# Patient Record
Sex: FEMALE | Race: WHITE | NOT HISPANIC OR LATINO | Employment: OTHER | URBAN - METROPOLITAN AREA
[De-identification: names, ages, dates, MRNs, and addresses within clinical notes are randomized per-mention and may not be internally consistent; named-entity substitution may affect disease eponyms.]

---

## 2017-01-11 ENCOUNTER — COMMUNICATION - HEALTHEAST (OUTPATIENT)
Dept: RHEUMATOLOGY | Facility: CLINIC | Age: 62
End: 2017-01-11

## 2017-01-11 ENCOUNTER — OFFICE VISIT - HEALTHEAST (OUTPATIENT)
Dept: FAMILY MEDICINE | Facility: CLINIC | Age: 62
End: 2017-01-11

## 2017-01-11 DIAGNOSIS — M06.9 RHEUMATOID ARTHRITIS (H): ICD-10-CM

## 2017-01-11 DIAGNOSIS — M05.9 SEROPOSITIVE RHEUMATOID ARTHRITIS (H): ICD-10-CM

## 2017-01-11 DIAGNOSIS — J32.9 RHINOSINUSITIS: ICD-10-CM

## 2017-01-12 LAB
ALT SERPL W P-5'-P-CCNC: 25 U/L (ref 0–45)
CREAT SERPL-MCNC: 0.93 MG/DL (ref 0.6–1.1)
GFR SERPL CREATININE-BSD FRML MDRD: >60 ML/MIN/1.73M2

## 2017-02-14 ENCOUNTER — COMMUNICATION - HEALTHEAST (OUTPATIENT)
Dept: RHEUMATOLOGY | Facility: CLINIC | Age: 62
End: 2017-02-14

## 2017-02-14 DIAGNOSIS — M05.9 SEROPOSITIVE RHEUMATOID ARTHRITIS (H): ICD-10-CM

## 2017-02-16 ENCOUNTER — COMMUNICATION - HEALTHEAST (OUTPATIENT)
Dept: FAMILY MEDICINE | Facility: CLINIC | Age: 62
End: 2017-02-16

## 2017-02-20 ENCOUNTER — COMMUNICATION - HEALTHEAST (OUTPATIENT)
Dept: SCHEDULING | Facility: CLINIC | Age: 62
End: 2017-02-20

## 2017-02-21 ENCOUNTER — OFFICE VISIT - HEALTHEAST (OUTPATIENT)
Dept: FAMILY MEDICINE | Facility: CLINIC | Age: 62
End: 2017-02-21

## 2017-02-21 DIAGNOSIS — D72.819 LEUKOPENIA, UNSPECIFIED TYPE: ICD-10-CM

## 2017-02-21 DIAGNOSIS — D84.9 IMMUNOSUPPRESSED STATUS (H): ICD-10-CM

## 2017-02-21 DIAGNOSIS — Z79.899 HIGH RISK MEDICATION USE: ICD-10-CM

## 2017-02-21 DIAGNOSIS — J10.1 INFLUENZA A: ICD-10-CM

## 2017-02-21 ASSESSMENT — MIFFLIN-ST. JEOR: SCORE: 1222.2

## 2017-03-06 ENCOUNTER — AMBULATORY - HEALTHEAST (OUTPATIENT)
Dept: LAB | Facility: CLINIC | Age: 62
End: 2017-03-06

## 2017-03-06 ENCOUNTER — COMMUNICATION - HEALTHEAST (OUTPATIENT)
Dept: LAB | Facility: CLINIC | Age: 62
End: 2017-03-06

## 2017-03-06 DIAGNOSIS — M06.9 RA (RHEUMATOID ARTHRITIS) (H): ICD-10-CM

## 2017-03-06 LAB
ALT SERPL W P-5'-P-CCNC: 25 U/L (ref 0–45)
CREAT SERPL-MCNC: 0.76 MG/DL (ref 0.6–1.1)
GFR SERPL CREATININE-BSD FRML MDRD: >60 ML/MIN/1.73M2

## 2017-03-08 ENCOUNTER — COMMUNICATION - HEALTHEAST (OUTPATIENT)
Dept: RHEUMATOLOGY | Facility: CLINIC | Age: 62
End: 2017-03-08

## 2017-03-08 DIAGNOSIS — M05.9 SEROPOSITIVE RHEUMATOID ARTHRITIS (H): ICD-10-CM

## 2017-03-09 ENCOUNTER — OFFICE VISIT - HEALTHEAST (OUTPATIENT)
Dept: RHEUMATOLOGY | Facility: CLINIC | Age: 62
End: 2017-03-09

## 2017-03-09 DIAGNOSIS — Z79.899 HIGH RISK MEDICATION USE: ICD-10-CM

## 2017-03-09 DIAGNOSIS — M05.79 SEROPOSITIVE RHEUMATOID ARTHRITIS OF MULTIPLE SITES (H): ICD-10-CM

## 2017-03-09 DIAGNOSIS — R76.8 CYCLIC CITRULLINATED PEPTIDE (CCP) ANTIBODY POSITIVE: ICD-10-CM

## 2017-03-09 ASSESSMENT — MIFFLIN-ST. JEOR: SCORE: 1215.4

## 2017-04-19 ENCOUNTER — RECORDS - HEALTHEAST (OUTPATIENT)
Dept: ADMINISTRATIVE | Facility: OTHER | Age: 62
End: 2017-04-19

## 2017-05-08 ENCOUNTER — AMBULATORY - HEALTHEAST (OUTPATIENT)
Dept: LAB | Facility: CLINIC | Age: 62
End: 2017-05-08

## 2017-05-08 DIAGNOSIS — M06.9 RA (RHEUMATOID ARTHRITIS) (H): ICD-10-CM

## 2017-05-08 LAB
ALT SERPL W P-5'-P-CCNC: 22 U/L (ref 0–45)
CREAT SERPL-MCNC: 0.79 MG/DL (ref 0.6–1.1)
GFR SERPL CREATININE-BSD FRML MDRD: >60 ML/MIN/1.73M2

## 2017-05-25 ENCOUNTER — COMMUNICATION - HEALTHEAST (OUTPATIENT)
Dept: RHEUMATOLOGY | Facility: CLINIC | Age: 62
End: 2017-05-25

## 2017-05-25 DIAGNOSIS — M05.79 SEROPOSITIVE RHEUMATOID ARTHRITIS OF MULTIPLE SITES (H): ICD-10-CM

## 2017-05-25 DIAGNOSIS — M05.9 SEROPOSITIVE RHEUMATOID ARTHRITIS (H): ICD-10-CM

## 2017-06-21 ENCOUNTER — COMMUNICATION - HEALTHEAST (OUTPATIENT)
Dept: FAMILY MEDICINE | Facility: CLINIC | Age: 62
End: 2017-06-21

## 2017-07-11 ENCOUNTER — AMBULATORY - HEALTHEAST (OUTPATIENT)
Dept: LAB | Facility: CLINIC | Age: 62
End: 2017-07-11

## 2017-07-11 DIAGNOSIS — M06.9 RA (RHEUMATOID ARTHRITIS) (H): ICD-10-CM

## 2017-07-11 LAB
ALT SERPL W P-5'-P-CCNC: 23 U/L (ref 0–45)
CREAT SERPL-MCNC: 0.69 MG/DL (ref 0.6–1.1)
GFR SERPL CREATININE-BSD FRML MDRD: >60 ML/MIN/1.73M2

## 2017-07-13 ENCOUNTER — OFFICE VISIT - HEALTHEAST (OUTPATIENT)
Dept: RHEUMATOLOGY | Facility: CLINIC | Age: 62
End: 2017-07-13

## 2017-07-13 DIAGNOSIS — Z79.899 HIGH RISK MEDICATION USE: ICD-10-CM

## 2017-07-13 DIAGNOSIS — M05.79 SEROPOSITIVE RHEUMATOID ARTHRITIS OF MULTIPLE SITES (H): ICD-10-CM

## 2017-07-13 DIAGNOSIS — R76.8 CYCLIC CITRULLINATED PEPTIDE (CCP) ANTIBODY POSITIVE: ICD-10-CM

## 2017-07-13 ASSESSMENT — MIFFLIN-ST. JEOR: SCORE: 1232.18

## 2017-09-13 ENCOUNTER — AMBULATORY - HEALTHEAST (OUTPATIENT)
Dept: LAB | Facility: CLINIC | Age: 62
End: 2017-09-13

## 2017-09-13 DIAGNOSIS — M06.9 RA (RHEUMATOID ARTHRITIS) (H): ICD-10-CM

## 2017-09-13 LAB
ALT SERPL W P-5'-P-CCNC: 20 U/L (ref 0–45)
CREAT SERPL-MCNC: 0.85 MG/DL (ref 0.6–1.1)
GFR SERPL CREATININE-BSD FRML MDRD: >60 ML/MIN/1.73M2

## 2017-09-20 ENCOUNTER — COMMUNICATION - HEALTHEAST (OUTPATIENT)
Dept: RHEUMATOLOGY | Facility: CLINIC | Age: 62
End: 2017-09-20

## 2017-09-20 DIAGNOSIS — M05.79 SEROPOSITIVE RHEUMATOID ARTHRITIS OF MULTIPLE SITES (H): ICD-10-CM

## 2017-10-24 ENCOUNTER — COMMUNICATION - HEALTHEAST (OUTPATIENT)
Dept: ENDOCRINOLOGY | Facility: CLINIC | Age: 62
End: 2017-10-24

## 2017-10-24 DIAGNOSIS — M05.79 SEROPOSITIVE RHEUMATOID ARTHRITIS OF MULTIPLE SITES (H): ICD-10-CM

## 2017-11-07 ENCOUNTER — RECORDS - HEALTHEAST (OUTPATIENT)
Dept: ADMINISTRATIVE | Facility: OTHER | Age: 62
End: 2017-11-07

## 2017-11-13 ENCOUNTER — AMBULATORY - HEALTHEAST (OUTPATIENT)
Dept: LAB | Facility: CLINIC | Age: 62
End: 2017-11-13

## 2017-11-13 DIAGNOSIS — M06.9 RA (RHEUMATOID ARTHRITIS) (H): ICD-10-CM

## 2017-11-13 LAB
ALT SERPL W P-5'-P-CCNC: 24 U/L (ref 0–45)
CREAT SERPL-MCNC: 0.75 MG/DL (ref 0.6–1.1)
GFR SERPL CREATININE-BSD FRML MDRD: >60 ML/MIN/1.73M2

## 2017-11-16 ENCOUNTER — OFFICE VISIT - HEALTHEAST (OUTPATIENT)
Dept: RHEUMATOLOGY | Facility: CLINIC | Age: 62
End: 2017-11-16

## 2017-11-16 DIAGNOSIS — M05.79 SEROPOSITIVE RHEUMATOID ARTHRITIS OF MULTIPLE SITES (H): ICD-10-CM

## 2017-11-16 DIAGNOSIS — Z79.899 HIGH RISK MEDICATION USE: ICD-10-CM

## 2017-11-16 DIAGNOSIS — R76.8 CYCLIC CITRULLINATED PEPTIDE (CCP) ANTIBODY POSITIVE: ICD-10-CM

## 2018-01-03 ENCOUNTER — COMMUNICATION - HEALTHEAST (OUTPATIENT)
Dept: RHEUMATOLOGY | Facility: CLINIC | Age: 63
End: 2018-01-03

## 2018-01-17 ENCOUNTER — COMMUNICATION - HEALTHEAST (OUTPATIENT)
Dept: RHEUMATOLOGY | Facility: CLINIC | Age: 63
End: 2018-01-17

## 2018-01-18 ENCOUNTER — RECORDS - HEALTHEAST (OUTPATIENT)
Dept: GENERAL RADIOLOGY | Facility: CLINIC | Age: 63
End: 2018-01-18

## 2018-01-18 ENCOUNTER — COMMUNICATION - HEALTHEAST (OUTPATIENT)
Dept: FAMILY MEDICINE | Facility: CLINIC | Age: 63
End: 2018-01-18

## 2018-01-18 ENCOUNTER — OFFICE VISIT - HEALTHEAST (OUTPATIENT)
Dept: FAMILY MEDICINE | Facility: CLINIC | Age: 63
End: 2018-01-18

## 2018-01-18 DIAGNOSIS — M25.569 KNEE PAIN: ICD-10-CM

## 2018-01-18 DIAGNOSIS — M25.569 PAIN IN UNSPECIFIED KNEE: ICD-10-CM
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
## 2018-01-18 ASSESSMENT — MIFFLIN-ST. JEOR: SCORE: 1244.88

## 2018-02-16 ENCOUNTER — AMBULATORY - HEALTHEAST (OUTPATIENT)
Dept: LAB | Facility: CLINIC | Age: 63
End: 2018-02-16

## 2018-02-16 DIAGNOSIS — M06.9 RA (RHEUMATOID ARTHRITIS) (H): ICD-10-CM

## 2018-02-16 LAB
ALBUMIN SERPL-MCNC: 3.7 G/DL (ref 3.5–5)
ALT SERPL W P-5'-P-CCNC: 29 U/L (ref 0–45)
CREAT SERPL-MCNC: 0.72 MG/DL (ref 0.6–1.1)
ERYTHROCYTE [DISTWIDTH] IN BLOOD BY AUTOMATED COUNT: 11.5 % (ref 11–14.5)
GFR SERPL CREATININE-BSD FRML MDRD: >60 ML/MIN/1.73M2
HCT VFR BLD AUTO: 37.7 % (ref 35–47)
HGB BLD-MCNC: 12.7 G/DL (ref 12–16)
MCH RBC QN AUTO: 29.8 PG (ref 27–34)
MCHC RBC AUTO-ENTMCNC: 33.7 G/DL (ref 32–36)
MCV RBC AUTO: 88 FL (ref 80–100)
PLATELET # BLD AUTO: 189 THOU/UL (ref 140–440)
PMV BLD AUTO: 8.1 FL (ref 7–10)
RBC # BLD AUTO: 4.26 MILL/UL (ref 3.8–5.4)
WBC: 4.5 THOU/UL (ref 4–11)

## 2018-02-20 ENCOUNTER — COMMUNICATION - HEALTHEAST (OUTPATIENT)
Dept: RHEUMATOLOGY | Facility: CLINIC | Age: 63
End: 2018-02-20

## 2018-02-20 DIAGNOSIS — M05.79 SEROPOSITIVE RHEUMATOID ARTHRITIS OF MULTIPLE SITES (H): ICD-10-CM

## 2018-02-28 ENCOUNTER — COMMUNICATION - HEALTHEAST (OUTPATIENT)
Dept: ADMINISTRATIVE | Facility: CLINIC | Age: 63
End: 2018-02-28

## 2018-03-07 ENCOUNTER — OFFICE VISIT - HEALTHEAST (OUTPATIENT)
Dept: RHEUMATOLOGY | Facility: CLINIC | Age: 63
End: 2018-03-07

## 2018-03-07 DIAGNOSIS — M25.561 ACUTE PAIN OF RIGHT KNEE: ICD-10-CM

## 2018-03-07 DIAGNOSIS — M05.79 SEROPOSITIVE RHEUMATOID ARTHRITIS OF MULTIPLE SITES (H): ICD-10-CM

## 2018-04-11 ENCOUNTER — OFFICE VISIT - HEALTHEAST (OUTPATIENT)
Dept: FAMILY MEDICINE | Facility: CLINIC | Age: 63
End: 2018-04-11

## 2018-04-11 DIAGNOSIS — N30.01 ACUTE CYSTITIS WITH HEMATURIA: ICD-10-CM

## 2018-04-11 DIAGNOSIS — R30.0 DYSURIA: ICD-10-CM

## 2018-04-11 LAB
ALBUMIN UR-MCNC: NEGATIVE MG/DL
APPEARANCE UR: CLEAR
BILIRUB UR QL STRIP: NEGATIVE
COLOR UR AUTO: YELLOW
GLUCOSE UR STRIP-MCNC: NEGATIVE MG/DL
HGB UR QL STRIP: ABNORMAL
KETONES UR STRIP-MCNC: NEGATIVE MG/DL
LEUKOCYTE ESTERASE UR QL STRIP: ABNORMAL
NITRATE UR QL: NEGATIVE
PH UR STRIP: 5.5 [PH] (ref 5–8)
SP GR UR STRIP: >=1.03 (ref 1–1.03)
UROBILINOGEN UR STRIP-ACNC: ABNORMAL

## 2018-04-11 ASSESSMENT — MIFFLIN-ST. JEOR: SCORE: 1224.47

## 2018-04-12 ENCOUNTER — COMMUNICATION - HEALTHEAST (OUTPATIENT)
Dept: FAMILY MEDICINE | Facility: CLINIC | Age: 63
End: 2018-04-12

## 2018-04-14 LAB
BACTERIA SPEC CULT: ABNORMAL
BACTERIA SPEC CULT: ABNORMAL

## 2018-05-07 ENCOUNTER — COMMUNICATION - HEALTHEAST (OUTPATIENT)
Dept: LAB | Facility: CLINIC | Age: 63
End: 2018-05-07

## 2018-05-07 DIAGNOSIS — M05.79 SEROPOSITIVE RHEUMATOID ARTHRITIS OF MULTIPLE SITES (H): ICD-10-CM

## 2018-05-14 ENCOUNTER — AMBULATORY - HEALTHEAST (OUTPATIENT)
Dept: LAB | Facility: CLINIC | Age: 63
End: 2018-05-14

## 2018-05-14 DIAGNOSIS — M05.79 SEROPOSITIVE RHEUMATOID ARTHRITIS OF MULTIPLE SITES (H): ICD-10-CM

## 2018-05-14 LAB
ALBUMIN SERPL-MCNC: 3.7 G/DL (ref 3.5–5)
ALT SERPL W P-5'-P-CCNC: 23 U/L (ref 0–45)
CREAT SERPL-MCNC: 0.68 MG/DL (ref 0.6–1.1)
ERYTHROCYTE [DISTWIDTH] IN BLOOD BY AUTOMATED COUNT: 11.8 % (ref 11–14.5)
GFR SERPL CREATININE-BSD FRML MDRD: >60 ML/MIN/1.73M2
HCT VFR BLD AUTO: 38.7 % (ref 35–47)
HGB BLD-MCNC: 12.6 G/DL (ref 12–16)
MCH RBC QN AUTO: 29 PG (ref 27–34)
MCHC RBC AUTO-ENTMCNC: 32.7 G/DL (ref 32–36)
MCV RBC AUTO: 89 FL (ref 80–100)
PLATELET # BLD AUTO: 202 THOU/UL (ref 140–440)
PMV BLD AUTO: 7.9 FL (ref 7–10)
RBC # BLD AUTO: 4.36 MILL/UL (ref 3.8–5.4)
WBC: 4.5 THOU/UL (ref 4–11)

## 2018-05-16 ENCOUNTER — OFFICE VISIT - HEALTHEAST (OUTPATIENT)
Dept: RHEUMATOLOGY | Facility: CLINIC | Age: 63
End: 2018-05-16

## 2018-05-16 ENCOUNTER — RECORDS - HEALTHEAST (OUTPATIENT)
Dept: ADMINISTRATIVE | Facility: OTHER | Age: 63
End: 2018-05-16

## 2018-05-16 DIAGNOSIS — M05.79 SEROPOSITIVE RHEUMATOID ARTHRITIS OF MULTIPLE SITES (H): ICD-10-CM

## 2018-05-16 DIAGNOSIS — R76.8 CYCLIC CITRULLINATED PEPTIDE (CCP) ANTIBODY POSITIVE: ICD-10-CM

## 2018-05-16 DIAGNOSIS — Z79.899 HIGH RISK MEDICATION USE: ICD-10-CM

## 2018-05-29 ENCOUNTER — COMMUNICATION - HEALTHEAST (OUTPATIENT)
Dept: RHEUMATOLOGY | Facility: CLINIC | Age: 63
End: 2018-05-29

## 2018-05-29 ENCOUNTER — OFFICE VISIT - HEALTHEAST (OUTPATIENT)
Dept: FAMILY MEDICINE | Facility: CLINIC | Age: 63
End: 2018-05-29

## 2018-05-29 DIAGNOSIS — K12.0 CANKER SORES ORAL: ICD-10-CM

## 2018-05-30 ENCOUNTER — OFFICE VISIT - HEALTHEAST (OUTPATIENT)
Dept: FAMILY MEDICINE | Facility: CLINIC | Age: 63
End: 2018-05-30

## 2018-05-30 ENCOUNTER — COMMUNICATION - HEALTHEAST (OUTPATIENT)
Dept: SCHEDULING | Facility: CLINIC | Age: 63
End: 2018-05-30

## 2018-05-30 DIAGNOSIS — Z79.899 HIGH RISK MEDICATION USE: ICD-10-CM

## 2018-05-30 DIAGNOSIS — M05.79 SEROPOSITIVE RHEUMATOID ARTHRITIS OF MULTIPLE SITES (H): ICD-10-CM

## 2018-05-30 ASSESSMENT — MIFFLIN-ST. JEOR: SCORE: 1223.34

## 2018-06-02 ENCOUNTER — COMMUNICATION - HEALTHEAST (OUTPATIENT)
Dept: SCHEDULING | Facility: CLINIC | Age: 63
End: 2018-06-02

## 2018-06-04 ENCOUNTER — COMMUNICATION - HEALTHEAST (OUTPATIENT)
Dept: FAMILY MEDICINE | Facility: CLINIC | Age: 63
End: 2018-06-04

## 2018-06-05 ENCOUNTER — RECORDS - HEALTHEAST (OUTPATIENT)
Dept: ADMINISTRATIVE | Facility: OTHER | Age: 63
End: 2018-06-05

## 2018-06-07 ENCOUNTER — OFFICE VISIT - HEALTHEAST (OUTPATIENT)
Dept: FAMILY MEDICINE | Facility: CLINIC | Age: 63
End: 2018-06-07

## 2018-06-07 DIAGNOSIS — E04.9 GOITER: ICD-10-CM

## 2018-06-07 LAB — TSH SERPL DL<=0.005 MIU/L-ACNC: 1.52 UIU/ML (ref 0.3–5)

## 2018-06-07 ASSESSMENT — MIFFLIN-ST. JEOR: SCORE: 1251.68

## 2018-06-11 ENCOUNTER — COMMUNICATION - HEALTHEAST (OUTPATIENT)
Dept: RHEUMATOLOGY | Facility: CLINIC | Age: 63
End: 2018-06-11

## 2018-06-13 ENCOUNTER — OFFICE VISIT - HEALTHEAST (OUTPATIENT)
Dept: FAMILY MEDICINE | Facility: CLINIC | Age: 63
End: 2018-06-13

## 2018-06-13 ENCOUNTER — COMMUNICATION - HEALTHEAST (OUTPATIENT)
Dept: FAMILY MEDICINE | Facility: CLINIC | Age: 63
End: 2018-06-13

## 2018-06-13 DIAGNOSIS — R35.0 FREQUENT URINATION: ICD-10-CM

## 2018-06-13 DIAGNOSIS — N30.01 ACUTE CYSTITIS WITH HEMATURIA: ICD-10-CM

## 2018-06-13 DIAGNOSIS — B37.31 YEAST INFECTION OF THE VAGINA: ICD-10-CM

## 2018-06-13 LAB
ALBUMIN UR-MCNC: NEGATIVE MG/DL
APPEARANCE UR: CLEAR
BILIRUB UR QL STRIP: NEGATIVE
COLOR UR AUTO: YELLOW
GLUCOSE UR STRIP-MCNC: NEGATIVE MG/DL
HGB UR QL STRIP: ABNORMAL
KETONES UR STRIP-MCNC: NEGATIVE MG/DL
LEUKOCYTE ESTERASE UR QL STRIP: ABNORMAL
NITRATE UR QL: NEGATIVE
PH UR STRIP: 7 [PH] (ref 5–8)
SP GR UR STRIP: 1.01 (ref 1–1.03)
UROBILINOGEN UR STRIP-ACNC: ABNORMAL

## 2018-06-13 ASSESSMENT — MIFFLIN-ST. JEOR: SCORE: 1238.07

## 2018-06-15 ENCOUNTER — RECORDS - HEALTHEAST (OUTPATIENT)
Dept: ADMINISTRATIVE | Facility: OTHER | Age: 63
End: 2018-06-15

## 2018-06-16 LAB — BACTERIA SPEC CULT: ABNORMAL

## 2018-06-21 ENCOUNTER — COMMUNICATION - HEALTHEAST (OUTPATIENT)
Dept: FAMILY MEDICINE | Facility: CLINIC | Age: 63
End: 2018-06-21

## 2018-06-21 ENCOUNTER — AMBULATORY - HEALTHEAST (OUTPATIENT)
Dept: FAMILY MEDICINE | Facility: CLINIC | Age: 63
End: 2018-06-21

## 2018-06-21 DIAGNOSIS — N76.0 ACUTE VAGINITIS: ICD-10-CM

## 2018-08-13 ENCOUNTER — AMBULATORY - HEALTHEAST (OUTPATIENT)
Dept: LAB | Facility: CLINIC | Age: 63
End: 2018-08-13

## 2018-08-13 DIAGNOSIS — M05.79 SEROPOSITIVE RHEUMATOID ARTHRITIS OF MULTIPLE SITES (H): ICD-10-CM

## 2018-08-13 LAB
ALBUMIN SERPL-MCNC: 3.9 G/DL (ref 3.5–5)
ALT SERPL W P-5'-P-CCNC: 24 U/L (ref 0–45)
CREAT SERPL-MCNC: 0.7 MG/DL (ref 0.6–1.1)
ERYTHROCYTE [DISTWIDTH] IN BLOOD BY AUTOMATED COUNT: 11.2 % (ref 11–14.5)
GFR SERPL CREATININE-BSD FRML MDRD: >60 ML/MIN/1.73M2
HCT VFR BLD AUTO: 38.3 % (ref 35–47)
HGB BLD-MCNC: 12.7 G/DL (ref 12–16)
MCH RBC QN AUTO: 29.1 PG (ref 27–34)
MCHC RBC AUTO-ENTMCNC: 33.3 G/DL (ref 32–36)
MCV RBC AUTO: 87 FL (ref 80–100)
PLATELET # BLD AUTO: 193 THOU/UL (ref 140–440)
PMV BLD AUTO: 7.7 FL (ref 7–10)
RBC # BLD AUTO: 4.38 MILL/UL (ref 3.8–5.4)
WBC: 4.6 THOU/UL (ref 4–11)

## 2018-10-03 ENCOUNTER — OFFICE VISIT - HEALTHEAST (OUTPATIENT)
Dept: FAMILY MEDICINE | Facility: CLINIC | Age: 63
End: 2018-10-03

## 2018-10-03 DIAGNOSIS — T14.8XXA MUSCLE STRAIN: ICD-10-CM

## 2018-10-03 ASSESSMENT — MIFFLIN-ST. JEOR: SCORE: 1233.54

## 2018-10-19 ENCOUNTER — AMBULATORY - HEALTHEAST (OUTPATIENT)
Dept: NURSING | Facility: CLINIC | Age: 63
End: 2018-10-19

## 2018-11-15 ENCOUNTER — AMBULATORY - HEALTHEAST (OUTPATIENT)
Dept: LAB | Facility: CLINIC | Age: 63
End: 2018-11-15

## 2018-11-15 DIAGNOSIS — M05.79 SEROPOSITIVE RHEUMATOID ARTHRITIS OF MULTIPLE SITES (H): ICD-10-CM

## 2018-11-15 LAB
ERYTHROCYTE [DISTWIDTH] IN BLOOD BY AUTOMATED COUNT: 10.7 % (ref 11–14.5)
HCT VFR BLD AUTO: 37.2 % (ref 35–47)
HGB BLD-MCNC: 12.8 G/DL (ref 12–16)
MCH RBC QN AUTO: 29.8 PG (ref 27–34)
MCHC RBC AUTO-ENTMCNC: 34.4 G/DL (ref 32–36)
MCV RBC AUTO: 87 FL (ref 80–100)
PLATELET # BLD AUTO: 181 THOU/UL (ref 140–440)
PMV BLD AUTO: 8.3 FL (ref 7–10)
RBC # BLD AUTO: 4.28 MILL/UL (ref 3.8–5.4)
WBC: 4.6 THOU/UL (ref 4–11)

## 2018-11-16 LAB
ALBUMIN SERPL-MCNC: 3.7 G/DL (ref 3.5–5)
ALT SERPL W P-5'-P-CCNC: 25 U/L (ref 0–45)
CREAT SERPL-MCNC: 0.85 MG/DL (ref 0.6–1.1)
GFR SERPL CREATININE-BSD FRML MDRD: >60 ML/MIN/1.73M2

## 2018-11-19 ENCOUNTER — OFFICE VISIT - HEALTHEAST (OUTPATIENT)
Dept: RHEUMATOLOGY | Facility: CLINIC | Age: 63
End: 2018-11-19

## 2018-11-19 DIAGNOSIS — R76.8 CYCLIC CITRULLINATED PEPTIDE (CCP) ANTIBODY POSITIVE: ICD-10-CM

## 2018-11-19 DIAGNOSIS — M05.79 SEROPOSITIVE RHEUMATOID ARTHRITIS OF MULTIPLE SITES (H): ICD-10-CM

## 2019-05-23 ENCOUNTER — COMMUNICATION - HEALTHEAST (OUTPATIENT)
Dept: FAMILY MEDICINE | Facility: CLINIC | Age: 64
End: 2019-05-23

## 2019-09-09 ENCOUNTER — OFFICE VISIT - HEALTHEAST (OUTPATIENT)
Dept: RHEUMATOLOGY | Facility: CLINIC | Age: 64
End: 2019-09-09

## 2019-09-09 DIAGNOSIS — R76.8 CYCLIC CITRULLINATED PEPTIDE (CCP) ANTIBODY POSITIVE: ICD-10-CM

## 2019-10-25 ENCOUNTER — OFFICE VISIT - HEALTHEAST (OUTPATIENT)
Dept: FAMILY MEDICINE | Facility: CLINIC | Age: 64
End: 2019-10-25

## 2019-10-25 DIAGNOSIS — R29.898 ELBOW PROBLEM: ICD-10-CM

## 2019-10-25 DIAGNOSIS — M70.21 OLECRANON BURSITIS, RIGHT ELBOW: ICD-10-CM

## 2019-10-25 DIAGNOSIS — Z12.11 COLON CANCER SCREENING: ICD-10-CM

## 2019-10-25 DIAGNOSIS — Z12.31 VISIT FOR SCREENING MAMMOGRAM: ICD-10-CM

## 2019-10-25 ASSESSMENT — MIFFLIN-ST. JEOR: SCORE: 1219.93

## 2019-11-13 ENCOUNTER — COMMUNICATION - HEALTHEAST (OUTPATIENT)
Dept: ADMINISTRATIVE | Facility: CLINIC | Age: 64
End: 2019-11-13

## 2020-01-03 ENCOUNTER — OFFICE VISIT - HEALTHEAST (OUTPATIENT)
Dept: FAMILY MEDICINE | Facility: CLINIC | Age: 65
End: 2020-01-03

## 2020-01-03 DIAGNOSIS — Z12.11 SCREEN FOR COLON CANCER: ICD-10-CM

## 2020-01-03 DIAGNOSIS — J01.00 ACUTE NON-RECURRENT MAXILLARY SINUSITIS: ICD-10-CM

## 2020-01-03 ASSESSMENT — MIFFLIN-ST. JEOR: SCORE: 1194.13

## 2020-04-01 ENCOUNTER — COMMUNICATION - HEALTHEAST (OUTPATIENT)
Dept: SCHEDULING | Facility: CLINIC | Age: 65
End: 2020-04-01

## 2020-04-01 ENCOUNTER — VIRTUAL VISIT (OUTPATIENT)
Dept: FAMILY MEDICINE | Facility: OTHER | Age: 65
End: 2020-04-01

## 2020-04-02 NOTE — PROGRESS NOTES
"Date: 2020 18:22:01  Clinician: Mari Tovar  Clinician NPI: [id number]  Patient: Clarisse Dubon  Patient : 1955  Patient Address: [address] [place] [id number]  Patient Phone: [phone]  Visit Protocol: General skin conditions  Patient Summary:  Clarisse is a 64 year old ( : 1955 ) female who initiated a Visit for evaluation of an unspecified skin condition. When asked the question \"Please sign me up to receive news, health information and promotions. \", Clarisse responded \"No\".    Images of her skin condition were uploaded.  Her symptoms started 1-2 weeks ago and affect the right side of her body. The skin condition is located on her legs. The skin condition is white and red in color.   The affected area has sores. It feels itchy and painful. The symptoms interfere with her sleep.   Symptom details     Redness: The redness has not rapidly increased in size.    Pain: The pain is mild (between 1-3 on a 10 point pain scale).     The skin condition has changed since the symptoms started. Description of changes as reported by the patient (free text): There was a white bump/hardened area on my hip,and I scratched it. The next day I noticed redness around the bump. By day 2 the redness started spreading out and itching more. By day 5 the redness and itching had increased a lot. It was about size 2 inches by 4 inches. Today is day 7 and it is continuing its spread and itching. I have had issues with rashes/infections in the past and slow healing. I needed prescriptions for them to heal. Cellulititis, staph, etc.   Denied symptoms include hives, warm to touch, drainage, crusts, blisters, burning, tender to touch, scabs, pimples, numbness, and dry/flaky skin. Clarisse does not feel feverish. She does not have a rash in the shape of a bull's-eye.   Treatments or home remedies used to relieve the symptoms as reported by the patient (free text): I cleaned the spot with alcohol at first, " not much change. Used extra strength benedryl anti itch cream. Helped the itch somewhat but not the redness. Then tried  non prescription hydrocortozone cream. Again, better for itching but rash kept spreading.  I don't know what else to do but the redness is definately increasing but the itching remains the same.   Precipitating events   Clarisse did not come in contact with any irritants prior to the onset of her symptoms and has not been in close contact with anyone that has similar symptoms. She also did not spend time in a wooded area, swim, travel, or spend excess time in the sun just before her symptoms started. Clarisse did not get bitten or stung by an insect.   Pertinent medical history  Clarisse has experienced this skin condition before. Her current skin condition does not come and go. The last time she experienced this skin condition was more than a year ago.   Clarisse has not had shingles in the past. She has not received the varicella vaccine. She has not received a shingles vaccine.   She has ongoing medical conditions. Ongoing medical conditions as reported by the patient (free text): I was diagnosed with rheumatoid arthritis five years ago but have been in remission for 15 months and off all medications.   Clarisse does not have a history or a family history of atopia.   Clarisse does not need a return to work/school note.   Weight: 135 lbs   Clarisse does not smoke or use smokeless tobacco.   Additional information as reported by the patient (free text): I have had four rashes in the past that required a doctor's attention. Cellulitis in 2018, staph/bacterial infections in the past 10 years, that all needed prescription medicines to cure them.   Weight: 135 lbs    MEDICATIONS: No current medications, ALLERGIES: NKDA  Clinician Response:  Dear Clarisse,   Based on the information provided, you have a rash without a clear cause. A rash can be caused by diseases, irritating substances, allergies, and your genetics.   Although some  skin rashes have a distinct appearance, many rash symptoms do not point to a specific cause. As long as symptoms are not a sign of a serious condition, treatment focuses on controlling symptoms.  Medication information  I am prescribing:     Triamcinolone acetonide (Triderm) 0.1% topical cream. Apply to the affected area(s) 2 times per day. Wash hands before and after use. There are no refills with this prescription.   Self care  Steps you can take to be as comfortable as possible:     Avoid scratching the rash    Apply a cool, wet washcloth to your rash for 15 minutes several times a day    Take a lukewarm bath to soothe the skin (adding colloidal oatmeal can help even more)    Apply a moisturizing lotion immediately after bathing and frequently reapply throughout the day    Use mild soap and laundry detergent    Choose clothing and bedding made of a breathable material like cotton    Do not use antibiotic creams or ointments unless recommended by a provider     When to seek care  Please make an appointment to be seen in a clinic or urgent care if any of the following occur:     Symptoms do not improve after 7 days of treatment    New symptoms develop, or symptoms become worse    Symptoms are so severe that you are unable to sleep or do regular activities    You have areas of broken skin from scratching    You notice symptoms of a skin infection (spreading redness, pain that is not improving, fever, warmth)      Diagnosis: Rash and other nonspecific skin eruption  Diagnosis ICD: R21  Prescription: triamcinolone acetonide (Triderm) 0.1 % topical cream 1 30 gram tube, 14 days supply. Apply to the affected area(s) 2 times per day. Refills: 0, Refill as needed: no, Allow substitutions: yes  Pharmacy: [id number] - [phone] - [address], [place] [address]

## 2020-11-08 ENCOUNTER — COMMUNICATION - HEALTHEAST (OUTPATIENT)
Dept: SCHEDULING | Facility: CLINIC | Age: 65
End: 2020-11-08

## 2020-11-09 ENCOUNTER — COMMUNICATION - HEALTHEAST (OUTPATIENT)
Dept: SCHEDULING | Facility: CLINIC | Age: 65
End: 2020-11-09

## 2020-11-09 ENCOUNTER — OFFICE VISIT - HEALTHEAST (OUTPATIENT)
Dept: UROLOGY | Facility: CLINIC | Age: 65
End: 2020-11-09

## 2020-11-09 DIAGNOSIS — N20.1 CALCULUS OF URETER: ICD-10-CM

## 2020-11-10 ENCOUNTER — COMMUNICATION - HEALTHEAST (OUTPATIENT)
Dept: FAMILY MEDICINE | Facility: CLINIC | Age: 65
End: 2020-11-10

## 2020-11-16 ENCOUNTER — OFFICE VISIT - HEALTHEAST (OUTPATIENT)
Dept: FAMILY MEDICINE | Facility: CLINIC | Age: 65
End: 2020-11-16

## 2020-11-16 DIAGNOSIS — R93.89 ABNORMAL CT OF THE CHEST: ICD-10-CM

## 2020-11-16 DIAGNOSIS — R93.3 ABNORMAL CT SCAN, SIGMOID COLON: ICD-10-CM

## 2020-11-16 DIAGNOSIS — N20.0 NEPHROLITHIASIS: ICD-10-CM

## 2020-11-16 DIAGNOSIS — K56.699 SIGMOID STRICTURE (H): ICD-10-CM

## 2020-11-16 DIAGNOSIS — R35.0 URINARY FREQUENCY: ICD-10-CM

## 2020-11-16 LAB
ALBUMIN UR-MCNC: NEGATIVE MG/DL
APPEARANCE UR: CLEAR
BILIRUB UR QL STRIP: NEGATIVE
COLOR UR AUTO: YELLOW
GLUCOSE UR STRIP-MCNC: NEGATIVE MG/DL
HGB UR QL STRIP: ABNORMAL
KETONES UR STRIP-MCNC: NEGATIVE MG/DL
LEUKOCYTE ESTERASE UR QL STRIP: NEGATIVE
NITRATE UR QL: NEGATIVE
PH UR STRIP: 5.5 [PH] (ref 5–8)
SP GR UR STRIP: 1.01 (ref 1–1.03)
UROBILINOGEN UR STRIP-ACNC: ABNORMAL

## 2020-11-17 LAB — BACTERIA SPEC CULT: NO GROWTH

## 2020-11-20 ENCOUNTER — COMMUNICATION - HEALTHEAST (OUTPATIENT)
Dept: SCHEDULING | Facility: CLINIC | Age: 65
End: 2020-11-20

## 2020-12-04 ENCOUNTER — COMMUNICATION - HEALTHEAST (OUTPATIENT)
Dept: UROLOGY | Facility: CLINIC | Age: 65
End: 2020-12-04

## 2020-12-16 ENCOUNTER — RECORDS - HEALTHEAST (OUTPATIENT)
Dept: ADMINISTRATIVE | Facility: OTHER | Age: 65
End: 2020-12-16

## 2021-02-04 ENCOUNTER — COMMUNICATION - HEALTHEAST (OUTPATIENT)
Dept: RHEUMATOLOGY | Facility: CLINIC | Age: 66
End: 2021-02-04

## 2021-04-09 ENCOUNTER — OFFICE VISIT - HEALTHEAST (OUTPATIENT)
Dept: FAMILY MEDICINE | Facility: CLINIC | Age: 66
End: 2021-04-09

## 2021-04-09 DIAGNOSIS — Z12.11 SCREEN FOR COLON CANCER: ICD-10-CM

## 2021-04-09 DIAGNOSIS — Z12.31 VISIT FOR SCREENING MAMMOGRAM: ICD-10-CM

## 2021-04-09 DIAGNOSIS — R35.0 FREQUENT URINATION: ICD-10-CM

## 2021-04-09 DIAGNOSIS — N30.01 ACUTE CYSTITIS WITH HEMATURIA: ICD-10-CM

## 2021-04-09 LAB
ALBUMIN UR-MCNC: ABNORMAL G/DL
APPEARANCE UR: ABNORMAL
BILIRUB UR QL STRIP: NEGATIVE
COLOR UR AUTO: YELLOW
GLUCOSE UR STRIP-MCNC: NEGATIVE MG/DL
HGB UR QL STRIP: ABNORMAL
KETONES UR STRIP-MCNC: NEGATIVE MG/DL
LEUKOCYTE ESTERASE UR QL STRIP: ABNORMAL
NITRATE UR QL: POSITIVE
PH UR STRIP: 7 [PH] (ref 5–8)
SP GR UR STRIP: 1.02 (ref 1–1.03)
UROBILINOGEN UR STRIP-ACNC: ABNORMAL

## 2021-04-09 ASSESSMENT — MIFFLIN-ST. JEOR: SCORE: 1201.79

## 2021-04-20 ENCOUNTER — COMMUNICATION - HEALTHEAST (OUTPATIENT)
Dept: FAMILY MEDICINE | Facility: CLINIC | Age: 66
End: 2021-04-20

## 2021-04-22 ENCOUNTER — OFFICE VISIT - HEALTHEAST (OUTPATIENT)
Dept: FAMILY MEDICINE | Facility: CLINIC | Age: 66
End: 2021-04-22

## 2021-04-22 DIAGNOSIS — N30.01 ACUTE CYSTITIS WITH HEMATURIA: ICD-10-CM

## 2021-04-22 DIAGNOSIS — R30.0 DYSURIA: ICD-10-CM

## 2021-04-22 LAB
ALBUMIN UR-MCNC: NEGATIVE G/DL
APPEARANCE UR: CLEAR
BILIRUB UR QL STRIP: NEGATIVE
COLOR UR AUTO: YELLOW
GLUCOSE UR STRIP-MCNC: NEGATIVE MG/DL
HGB UR QL STRIP: ABNORMAL
KETONES UR STRIP-MCNC: NEGATIVE MG/DL
LEUKOCYTE ESTERASE UR QL STRIP: ABNORMAL
NITRATE UR QL: NEGATIVE
PH UR STRIP: 7 [PH] (ref 5–8)
SP GR UR STRIP: 1.02 (ref 1–1.03)
UROBILINOGEN UR STRIP-ACNC: ABNORMAL

## 2021-04-22 ASSESSMENT — MIFFLIN-ST. JEOR: SCORE: 1197.25

## 2021-04-23 ENCOUNTER — AMBULATORY - HEALTHEAST (OUTPATIENT)
Dept: FAMILY MEDICINE | Facility: CLINIC | Age: 66
End: 2021-04-23

## 2021-04-23 ENCOUNTER — COMMUNICATION - HEALTHEAST (OUTPATIENT)
Dept: FAMILY MEDICINE | Facility: CLINIC | Age: 66
End: 2021-04-23

## 2021-04-23 DIAGNOSIS — R10.9 FLANK PAIN: ICD-10-CM

## 2021-04-23 LAB — BACTERIA SPEC CULT: NO GROWTH

## 2021-04-26 ENCOUNTER — HOSPITAL ENCOUNTER (OUTPATIENT)
Dept: ULTRASOUND IMAGING | Facility: CLINIC | Age: 66
Discharge: HOME | End: 2021-04-26
Attending: NURSE PRACTITIONER

## 2021-04-26 DIAGNOSIS — R10.9 FLANK PAIN: ICD-10-CM

## 2021-05-28 ENCOUNTER — RECORDS - HEALTHEAST (OUTPATIENT)
Dept: ADMINISTRATIVE | Facility: CLINIC | Age: 66
End: 2021-05-28

## 2021-05-30 ENCOUNTER — RECORDS - HEALTHEAST (OUTPATIENT)
Dept: ADMINISTRATIVE | Facility: CLINIC | Age: 66
End: 2021-05-30

## 2021-05-30 VITALS — BODY MASS INDEX: 24.3 KG/M2 | WEIGHT: 146 LBS

## 2021-05-30 VITALS — HEIGHT: 65 IN | WEIGHT: 146.5 LBS | BODY MASS INDEX: 24.41 KG/M2

## 2021-05-30 VITALS — BODY MASS INDEX: 24.66 KG/M2 | WEIGHT: 148 LBS | HEIGHT: 65 IN

## 2021-05-31 ENCOUNTER — RECORDS - HEALTHEAST (OUTPATIENT)
Dept: ADMINISTRATIVE | Facility: CLINIC | Age: 66
End: 2021-05-31

## 2021-05-31 VITALS — HEIGHT: 65 IN | BODY MASS INDEX: 25.49 KG/M2 | WEIGHT: 153 LBS

## 2021-05-31 VITALS — BODY MASS INDEX: 25.02 KG/M2 | WEIGHT: 150.2 LBS | HEIGHT: 65 IN

## 2021-05-31 VITALS — WEIGHT: 153 LBS | BODY MASS INDEX: 25.46 KG/M2

## 2021-06-01 VITALS — BODY MASS INDEX: 24.63 KG/M2 | WEIGHT: 148 LBS

## 2021-06-01 VITALS — BODY MASS INDEX: 23.78 KG/M2 | WEIGHT: 148 LBS | HEIGHT: 66 IN

## 2021-06-01 VITALS — HEIGHT: 65 IN | WEIGHT: 148.5 LBS | BODY MASS INDEX: 24.74 KG/M2

## 2021-06-01 VITALS — BODY MASS INDEX: 24.46 KG/M2 | WEIGHT: 147 LBS

## 2021-06-01 VITALS — WEIGHT: 148 LBS | BODY MASS INDEX: 24.63 KG/M2

## 2021-06-01 VITALS — HEIGHT: 66 IN | BODY MASS INDEX: 24.27 KG/M2 | WEIGHT: 151 LBS

## 2021-06-01 VITALS — HEIGHT: 65 IN | WEIGHT: 148.25 LBS | BODY MASS INDEX: 24.7 KG/M2

## 2021-06-01 NOTE — PROGRESS NOTES
ASSESSMENT AND PLAN:  Clarisse Dubon 63 y.o. female is a for follow-up of high titer anti-CCP positive, rheumatoid factor positive rheumatoid arthritis for which she had stopped taking all her medications and currently on none not even Tylenol if at all, and is doing great.  She credits resolution of her symptoms to giving up sugars entirely, including pop, non-sugar, and prayers.  This is coming to be almost a year that she stopped her hydroxychloroquine methotrexate.  She plans to continue with this schedule.  We will meet here one more time in a years time.         Diagnoses and all orders for this visit:    Osteoarthritis Of The Knee    Cyclic citrullinated peptide (CCP) antibody positive        HISTORY OF PRESENTING ILLNESS:  Clarisse Dubon 63 y.o.  is here for followup of now history of rheumatoid Arthritis and osteoarthritis.  Her rheumatoid arthritis is associated with both anti-CCP antibody and rheumatoid factor.  She is off methotrexate, folic acid, hydroxychloroquine, doing well.  She continues to do well occasional discomfort in the hands.  Pain level is 0.5/10 able to do all her day-to-day activities.  She wonders if this is because of substantial lifestyle changes.  She has quit sugar which means no pop, no bakery products.  As she retired.  She exercises regularly.  All in all very happy..  There is no associated swelling or discomfort in the small joints of the hands or wrists the elbows shoulders hips knees ankles feet.  She has noted no pain in the neck and back.  She noted no stiffness in the morning associated with this.  There is no fever or weight loss blurry vision mouth also nausea cough or rash.  Occasionally she gets redness of the eyes.  There is no discomfort associated with it.  Her recent labs are reviewed and within normal range.  Her symptoms presented in the 2014.  She is not aware of history of rheumatoid arthritis in her family.  Rheumatoid Arthritis Disease Activity Measure used:  RAPID3, reviewed  today and scanned in the chart.  ALLERGIES:Patient has no known allergies.    PAST MEDICAL/ACTIVE PROBLEMS/MEDICATION/SOCIAL DATA  Past Medical History:   Diagnosis Date     Arthritis      Cellulitis of ankle     Right     Cervical dysplasia      Contact dermatitis and other eczema due to plants (except food)     Poison Ivy     Edema     due to arthritis     Herpes simplex     type 1     History of transfusion      Hyperlipidemia      Insufficiency fracture of tibia 11/20/2015    Right ankle insufficiency fracture     Paroxysmal Atrial Fibrillation     Diagnosed 1/2/14.  CHADS2 - VASc score = 1 (gender) ASA Rx Sotalol (bradycardia) Flecainide pre-ablation PVI Dec 2014        Rheumatoid arthritis (H)      Rheumatoid arthritis (H)      Sinus bradycardia      Ureteral stone     Left     Vitamin D deficiency      Social History     Tobacco Use   Smoking Status Never Smoker   Smokeless Tobacco Never Used     Patient Active Problem List   Diagnosis     Paroxysmal Atrial Fibrillation     Essential Hypercholesterolemia     Arthralgias In Multiple Sites     Osteoarthritis Of The Knee     Osteopenia     Vitamin D Deficiency     Herpes Simplex Type I     Bradycardia by electrocardiogram     Status post ablation of atrial fibrillation     Foot pain, left     High risk medication use     Syncope     Flu     Postural dizziness with presyncope     Seropositive rheumatoid arthritis of multiple sites (H)     Cyclic citrullinated peptide (CCP) antibody positive     Dysuria     Cellulitis     Facial cellulitis     Contact dermatitis and eczema due to plant     Frequent urination     Yeast infection of the vagina     Muscle strain     Current Outpatient Medications   Medication Sig Dispense Refill     calcium citrate-vitamin D (CITRACAL+D) 315-200 mg-unit per tablet Take 1 tablet by mouth 2 (two) times a day.       cholecalciferol, vitamin D3, 1,000 unit tablet Take 1,000 Units by mouth daily.        multivit-min/ferrous fumarate (MULTI VITAMIN ORAL) Take by mouth daily.       No current facility-administered medications for this visit.        DETAILED EXAMINATION  09/09/19  :  Vitals:    09/09/19 1500   BP: 110/80   Patient Site: Right Arm   Patient Position: Sitting   Cuff Size: Adult Regular   Pulse: 64   Weight: 143 lb (64.9 kg)     Alert oriented. Head including the face is examined for malar rash, heliotropes, scarring, lupus pernio. Eyes examined for redness such as in episcleritis/scleritis, periorbital lesions.   Neck/ Face examined for parotid gland swelling, range of motion of neck.  Left upper and lower and right upper and lower extremities examined for tenderness, swelling, warmth of the appendicular joints, range of motion, edema, rash.  Some of the important findings included: He does not have synovitis in any of the joints of the upper extremities, no swelling warmth or effusion of the knees no JLT in either side.                  LAB / IMAGING DATA:  ALT   Date Value Ref Range Status   11/15/2018 25 0 - 45 U/L Final   08/13/2018 24 0 - 45 U/L Final   05/14/2018 23 0 - 45 U/L Final     Albumin   Date Value Ref Range Status   11/15/2018 3.7 3.5 - 5.0 g/dL Final   08/13/2018 3.9 3.5 - 5.0 g/dL Final   05/14/2018 3.7 3.5 - 5.0 g/dL Final     Creatinine   Date Value Ref Range Status   11/15/2018 0.85 0.60 - 1.10 mg/dL Final   08/13/2018 0.70 0.60 - 1.10 mg/dL Final   06/03/2018 0.67 0.60 - 1.10 mg/dL Final       WBC   Date Value Ref Range Status   11/15/2018 4.6 4.0 - 11.0 thou/uL Final   08/13/2018 4.6 4.0 - 11.0 thou/uL Final   09/08/2015 6.7 4.0 - 11.0 thou/uL Final   07/07/2015 4.5 4.0 - 11.0 thou/uL Final     Hemoglobin   Date Value Ref Range Status   11/15/2018 12.8 12.0 - 16.0 g/dL Final   08/13/2018 12.7 12.0 - 16.0 g/dL Final   06/03/2018 12.7 12.0 - 16.0 g/dL Final     Platelets   Date Value Ref Range Status   11/15/2018 181 140 - 440 thou/uL Final   08/13/2018 193 140 - 440 thou/uL Final    06/03/2018 186 140 - 440 thou/uL Final       Lab Results   Component Value Date     (H) 02/18/2014    SEDRATE 8 06/03/2018

## 2021-06-02 ENCOUNTER — RECORDS - HEALTHEAST (OUTPATIENT)
Dept: ADMINISTRATIVE | Facility: CLINIC | Age: 66
End: 2021-06-02

## 2021-06-02 VITALS — WEIGHT: 147 LBS | HEIGHT: 66 IN | BODY MASS INDEX: 23.63 KG/M2

## 2021-06-02 VITALS — WEIGHT: 147 LBS | BODY MASS INDEX: 23.73 KG/M2

## 2021-06-02 NOTE — PROGRESS NOTES
1. Elbow problem     2. Olecranon bursitis, right elbow  predniSONE (DELTASONE) 20 MG tablet   3. Colon cancer screening  Cologuard   4. Visit for screening mammogram  Mammo Screening Bilateral         ASSESSMENT/PLAN:     Body Mass Index was not assessed due to normal.    1. Elbow problem      2. Olecranon bursitis, right elbow    - predniSONE (DELTASONE) 20 MG tablet; Take 40 mg by mouth daily for 5 days.  Dispense: 10 tablet; Refill: 0  -Discussed treatment plan today: Activity as tolerated, may use Tylenol or ibuprofen for discomfort.  She may ice the area up to 20 minutes at a time several times per day.  Encouraged her to take the prednisone with a little bit of food every morning until completed.    3. Colon cancer screening    - Cologuard  -No family history of colon cancer, she has never undergone colon cancer screening before.  She is willing to do the Cologuard testing kit today.  She denies any changes recently with her bowel habits, no reports of black or bloody stools.    4. Visit for screening mammogram    - Mammo Screening Bilateral; Future    She is due for an annual physical with fasting lab work, we discussed the importance of yearly screening.    There are no Patient Instructions on file for this visit.  There are no discontinued medications.  Return in about 2 weeks (around 11/8/2019), or if symptoms worsen or fail to improve, for bursitis.         Mandie Mcintosh NP          SUBJECTIVE:  Clarisse Dubon is a 63 y.o. female who presents for swelling and redness of the right elbow that started 2 weeks ago.  Yesterday, she did noticed skin peeling off and noticed several blisters forming with oozing as well.  She states the site bothers her because it itches so bad and feels hot to touch.  3 days prior to her symptoms starting, her and her family were up at their cabin removing boards from the house, she did do this for several hours and then 3 days later her symptoms started.  She does not  recall being bitten by anything, she has not used any new products, she does not report bumping her elbow on anything.  She reports increased pain in the elbow when she straightens her arm out.  She has tried topical Benadryl and cortisol which helped reduce her eating minimally.  She has iced the area and continues to take ibuprofen for her discomfort.  She is rating her pain a 4 out of 10.  Her pain is nonradiating today.  Chief Complaint   Patient presents with     Elbow     Peeling and itching RT elbow - x 2 weeks         Patient Active Problem List   Diagnosis     Paroxysmal Atrial Fibrillation     Essential Hypercholesterolemia     Osteoarthritis Of The Knee     Osteopenia     Vitamin D Deficiency     Herpes Simplex Type I     Bradycardia by electrocardiogram     Status post ablation of atrial fibrillation     Foot pain, left     High risk medication use     Syncope     Flu     Postural dizziness with presyncope     Cyclic citrullinated peptide (CCP) antibody positive     Dysuria     Cellulitis     Facial cellulitis     Contact dermatitis and eczema due to plant     Frequent urination     Yeast infection of the vagina     Muscle strain       Current Outpatient Medications   Medication Sig Dispense Refill     calcium citrate-vitamin D (CITRACAL+D) 315-200 mg-unit per tablet Take 1 tablet by mouth 2 (two) times a day.       cholecalciferol, vitamin D3, 1,000 unit tablet Take 1,000 Units by mouth daily.       multivit-min/ferrous fumarate (MULTI VITAMIN ORAL) Take by mouth daily.       predniSONE (DELTASONE) 20 MG tablet Take 40 mg by mouth daily for 5 days. 10 tablet 0     No current facility-administered medications for this visit.        Social History     Tobacco Use   Smoking Status Never Smoker   Smokeless Tobacco Never Used       REVIEW OF SYSTEMS:  See HPI    TOBACCO USE:  Social History     Tobacco Use   Smoking Status Never Smoker   Smokeless Tobacco Never Used     Social History     Socioeconomic  History     Marital status:      Spouse name: Not on file     Number of children: Not on file     Years of education: Not on file     Highest education level: Not on file   Occupational History     Not on file   Social Needs     Financial resource strain: Not on file     Food insecurity:     Worry: Not on file     Inability: Not on file     Transportation needs:     Medical: Not on file     Non-medical: Not on file   Tobacco Use     Smoking status: Never Smoker     Smokeless tobacco: Never Used   Substance and Sexual Activity     Alcohol use: Yes     Frequency: 4 or more times a week     Drug use: No     Sexual activity: Not on file   Lifestyle     Physical activity:     Days per week: Not on file     Minutes per session: Not on file     Stress: Not on file   Relationships     Social connections:     Talks on phone: Not on file     Gets together: Not on file     Attends [employer] service: Not on file     Active member of club or organization: Not on file     Attends meetings of clubs or organizations: Not on file     Relationship status: Not on file     Intimate partner violence:     Fear of current or ex partner: Not on file     Emotionally abused: Not on file     Physically abused: Not on file     Forced sexual activity: Not on file   Other Topics Concern     Not on file   Social History Narrative     Not on file         OBJECTIVE:            Vitals:    10/25/19 1255   BP: 120/80   Pulse: (!) 59   Resp: 14   Temp: 97.9  F (36.6  C)   SpO2: 98%     Weight: 144 lb (65.3 kg)    Wt Readings from Last 3 Encounters:   10/25/19 144 lb (65.3 kg)   09/09/19 143 lb (64.9 kg)   11/19/18 147 lb (66.7 kg)     Body mass index is 23.24 kg/m .        Physical Exam:  GENERAL APPEARANCE: A&A, NAD, well hydrated, well nourished  CV: RRR, no M/G/R   LUNGS: CTAB, normal respiratory effort   EXTREMITY: She does have moderate swelling of the right elbow today, site does have moderate erythema and is hot to touch.  She does have  several small blisters on the elbow but none of them are oozing today.    SKIN:  Normal skin turgor, no lesions/rashes, warm and dry

## 2021-06-03 VITALS
DIASTOLIC BLOOD PRESSURE: 80 MMHG | HEART RATE: 64 BPM | SYSTOLIC BLOOD PRESSURE: 110 MMHG | BODY MASS INDEX: 23.08 KG/M2 | WEIGHT: 143 LBS

## 2021-06-03 VITALS
OXYGEN SATURATION: 98 % | TEMPERATURE: 97.9 F | RESPIRATION RATE: 14 BRPM | BODY MASS INDEX: 23.14 KG/M2 | HEART RATE: 59 BPM | SYSTOLIC BLOOD PRESSURE: 120 MMHG | DIASTOLIC BLOOD PRESSURE: 80 MMHG | WEIGHT: 144 LBS | HEIGHT: 66 IN

## 2021-06-04 VITALS
HEIGHT: 66 IN | OXYGEN SATURATION: 97 % | SYSTOLIC BLOOD PRESSURE: 128 MMHG | BODY MASS INDEX: 22.23 KG/M2 | DIASTOLIC BLOOD PRESSURE: 82 MMHG | WEIGHT: 138.31 LBS | TEMPERATURE: 98.9 F | HEART RATE: 64 BPM

## 2021-06-04 NOTE — PROGRESS NOTES
" OV   1/3/2020  Assessment:     1. Acute non-recurrent maxillary sinusitis  amoxicillin (AMOXIL) 875 MG tablet   2. Screen for colon cancer          Plan:      We will cover with Amoxicillin as directed for acute maxillary sinusitis. We reviewed use of OTC analgesics, decongestants, nasal steroids, and/or mucinex, as well as increased fluids, rest and humidity, etc. She will call or return to clinic with any ongoing or worsening symptoms.       Subjective:             Clarisse Dubon presents for evaluation of facial pain and sinus pressure. Symptoms began several days ago. Symptoms have gradually worsened since that time. Cough is described as dry. Symptoms are associated with frequent clearing of the throat, nasal congestion and sore throat, decrease in appetite, decrease in energy level and swollen glands, and chills. She denies fever, hemoptysis, shortness of breath and wheezing. She reports reclining position aggravates the cough.       Past history is significant for nothing.      The following portions of the patient's history were reviewed and updated as appropriate: allergies, current medications, past family history, past medical history, past social history, past surgical history and problem list.    Review of Systems  12 point ROS negative except as noted above.           Objective:        Vitals:    01/03/20 1607   BP: 128/82   Patient Position: Sitting   Cuff Size: Adult Regular   Pulse: 64   Temp: 98.9  F (37.2  C)   SpO2: 97%   Weight: 138 lb 5 oz (62.7 kg)   Height: 5' 6\" (1.676 m)      General     Alert, cooperative, no distress   Head:    Normocephalic, without obvious abnormality, atraumatic   Eyes:    PERRL, conjunctiva/corneas clear, EOM's intact   Ears:    Normal TM's and external ear canals, both ears   Nose:   Nasal mucosa normal, no drainage, and moderate bilateral maxillary and ethmoid sinus tenderness   Throat:   Oropharynx with moist mucous membranes and mild oropharyngeal erythema  "   Neck:   Supple, mild anterior cervical adenopathy and supple, symmetrical, trachea midline    Lungs:     clear to auscultation bilaterally   Heart :    Regular rate and rhythm, no murmur, rub or gallop   Abdomen:     Soft, non-tender, no masses   Skin:   Skin color, texture, turgor normal, no rashes or lesions

## 2021-06-05 VITALS
HEART RATE: 68 BPM | BODY MASS INDEX: 22.34 KG/M2 | RESPIRATION RATE: 16 BRPM | HEIGHT: 66 IN | TEMPERATURE: 98 F | WEIGHT: 139 LBS | DIASTOLIC BLOOD PRESSURE: 70 MMHG | OXYGEN SATURATION: 98 % | SYSTOLIC BLOOD PRESSURE: 106 MMHG

## 2021-06-05 VITALS
OXYGEN SATURATION: 97 % | SYSTOLIC BLOOD PRESSURE: 124 MMHG | HEIGHT: 66 IN | DIASTOLIC BLOOD PRESSURE: 80 MMHG | WEIGHT: 140 LBS | TEMPERATURE: 97.8 F | HEART RATE: 61 BPM | BODY MASS INDEX: 22.5 KG/M2 | RESPIRATION RATE: 16 BRPM

## 2021-06-05 VITALS
DIASTOLIC BLOOD PRESSURE: 70 MMHG | SYSTOLIC BLOOD PRESSURE: 102 MMHG | WEIGHT: 137.56 LBS | HEART RATE: 65 BPM | OXYGEN SATURATION: 98 % | BODY MASS INDEX: 22.2 KG/M2

## 2021-06-07 NOTE — TELEPHONE ENCOUNTER
Patient calling - says she had a small white bump on her right hip that she scratched about 7 days ago.  Since scratching it - the area is now is a rough red area about 2 inches by 4 inches.  She says over the last week it as been spreading.  Says she has a history of cellulitis and this looks similar.    No fever.    Triaged to disposition of See Physician Within 24 Hours.  Advised patient to go to [email or web address] to set up virtual visit per current nurse triage protocol.    Shea Hernandez RN  Triage Nurse Advisor    Reason for Disposition    [1] Looks infected (spreading redness, pus) AND [2] large red area (> 2 in. or 5 cm)    Protocols used: RASH OR REDNESS - [id number]

## 2021-06-08 NOTE — PROGRESS NOTES
Assessment:   1. Rhinosinusitis  - azithromycin (ZITHROMAX Z-NOEMI) 250 MG tablet; Take 2 tablets (500 mg) on  Day 1,  followed by 1 tablet (250 mg) once daily on Days 2 through 5.  Dispense: 6 tablet; Refill: 0  - sod bicarb-sod chlor-neti pot pkdv; 2 Squirts into each nostril 2 (two) times a day.  Dispense: 40 each; Refill: 0  - XR Chest PA and Lateral: Negative chest xray     Plan:   Antibiotics per medication orders.  Antitussives per medication orders.  Call if shortness of breath worsens, blood in sputum, change in character of cough, development of fever or chills, inability to maintain nutrition and hydration. Avoid exposure to tobacco smoke and fumes.  Chest x-ray.  Follow-up in 4 days, or sooner as needed.  Trial of antihistamines.     Subjective:       Clarisse Dubon is a 61 y.o. female here for evaluation of a cough. Onset of symptoms was 6 days ago. Symptoms have been gradually worsening since that time. The cough is barky, dry and nonproductive and is aggravated by reclining position. Associated symptoms include: change in voice, fever, shortness of breath and wheezing. Patient does not have a history of asthma. Patient does not have a history of environmental allergens. Patient has not traveled recently. Patient does not have a history of smoking. Patient has not had a previous chest x-ray. Patient has not had a PPD done.    The following portions of the patient's history were reviewed and updated as appropriate:   She  has a past medical history of Cellulitis of ankle; Cervical dysplasia; Contact dermatitis and other eczema due to plants (except food); Edema; Herpes simplex; Hyperlipidemia; Insufficiency fracture of tibia (11/20/2015); Paroxysmal Atrial Fibrillation; Sinus bradycardia; Ureteral stone; and Vitamin D deficiency.  She  does not have any pertinent problems on file.  She  has a past surgical history that includes Vaginal hysterectomy ( 07/25/2007); Cardiac electrophysiology mapping and  ablation (12/17/14); Cervix lesion destruction (June 1990); Dilation and curettage of uterus; and Breast biopsy (Right, 09/26/2007).  Current Outpatient Prescriptions   Medication Sig Dispense Refill     aspirin 81 MG EC tablet Take 81 mg by mouth daily.       CALCIUM ORAL Take by mouth.       CHOLECALCIFEROL, VITAMIN D3, (VITAMIN D3 ORAL) Take by mouth.       folic acid (FOLVITE) 1 MG tablet Take 1 tablet (1 mg total) by mouth daily. 30 tablet 11     folic acid (FOLVITE) 1 MG tablet Take 1 mg by mouth.       hydroxychloroquine (PLAQUENIL) 200 mg tablet Take 1 tablet (200 mg total) by mouth 2 (two) times a day. 60 tablet 6     hydroxychloroquine (PLAQUENIL) 200 mg tablet   6     methotrexate 2.5 MG tablet TAKE 4 TABLETS BY MOUTH ONCE WEEKLY. 18 tablet 1     triamcinolone (KENALOG) 0.1 % ointment Apply bid to affected area for 2-3 weeks as needed for rash 15 g 0     No current facility-administered medications for this visit.      She has No Known Allergies..    Review of Systems  A 12 point comprehensive review of systems was negative except as noted.      Objective:      Oxygen saturation 96% on room air  Visit Vitals     BP 98/60     Pulse 72     Temp 98.7  F (37.1  C)     Wt 146 lb (66.2 kg)     SpO2 96%     BMI 24.3 kg/m2     General appearance: alert, appears stated age and cooperative  Head: Normocephalic, without obvious abnormality, atraumatic  Eyes: conjunctivae/corneas clear. PERRL, EOM's intact. Fundi benign.  Ears: normal TM's and external ear canals both ears  Nose: misa blood and yellow discharge, moderate congestion, turbinates swollen, inflamed  Throat: lips, mucosa, and tongue normal; teeth and gums normal  Neck: no adenopathy, no carotid bruit, no JVD, supple, symmetrical, trachea midline and thyroid not enlarged, symmetric, no tenderness/mass/nodules  Lungs: clear to auscultation bilaterally  Heart: regular rate and rhythm, S1, S2 normal, no murmur, click, rub or gallop  Pulses: 2+ and  symmetric  Skin: Skin color, texture, turgor normal. No rashes or lesions  Lymph nodes: Cervical, supraclavicular, and axillary nodes normal.  Neurologic: Grossly normal

## 2021-06-09 NOTE — PROGRESS NOTES
ASSESSMENT AND PLAN:  Clarisse Dubno 61 y.o. female  has seropositive, double, rheumatoid arthritis, osteoarthritis. Her rheumatoid arthritis  appears to be under very good control.  Recently she contracted influenza A, was hospitalized with dehydration she had leukopenia white cell count going down to 2.3.  This is not returned to normal.  She did hold her methotrexate for 2 weeks.  She has residual tenderness in her left second third MTP areas.  She has osteoarthritis.  She is well aware of the management principles.  We will continue the current plan.            Diagnoses and all orders for this visit:    Seropositive rheumatoid arthritis of multiple sites  -     hydroxychloroquine (PLAQUENIL) 200 mg tablet; Take 1 tablet (200 mg total) by mouth 2 (two) times a day.  Dispense: 60 tablet; Refill: 4  -     methotrexate 2.5 MG tablet; Take 4 tablets (10 mg total) by mouth once a week.  Dispense: 16 tablet; Refill: 1    Cyclic citrullinated peptide (CCP) antibody positive    High risk medication use      HISTORY OF PRESENTING ILLNESS:  Clarisse Dubon 61 y.o. is here for followup of sero positive Rheumatoid Arthritis. Joints affected include multiple joints. Her arthropathy presented 2 + years ago. Onset was gradual. Her rheumatoid arthritis  symptoms are stable.  Symptoms included joint pain, joint swelling and morning stiffness and were of moderate and severe severity.Symptoms are made worse by: nothing. Symptoms are helped by current regimen.  Patient denies associated alopecia, fevers, nodules, oral ulcers, rashes/photosensitive, Raynaud's and seizures.  Overall disease activity:  Stable.  Recent hospitalization to the hospital given the influenza A.  She had associated leukopenia as noted.  She has little in the way of knee pain.  There is no morning stiffness.  She had fever and weight loss and eye redness and cough during recent hospitalization from which she has made good recovery.  Rheumatoid Arthritis Disease  Activity Measure used: RAPID3, reviewed  today and scanned in the chart.  ALLERGIES:Review of patient's allergies indicates no known allergies.    PAST MEDICAL/ACTIVE PROBLEMS/MEDICATION/SOCIAL DATA  Past Medical History:   Diagnosis Date     Arthritis      Cellulitis of ankle     Right     Cervical dysplasia      Contact dermatitis and other eczema due to plants (except food)     Poison Ivy     Edema     due to arthritis     Herpes simplex     type 1     History of transfusion      Hyperlipidemia      Insufficiency fracture of tibia 11/20/2015    Right ankle insufficiency fracture     Paroxysmal Atrial Fibrillation     Diagnosed 1/2/14.  CHADS2 - VASc score = 1 (gender) ASA Rx Sotalol (bradycardia) Flecainide pre-ablation PVI Dec 2014        Sinus bradycardia      Ureteral stone     Left     Vitamin D deficiency      History   Smoking Status     Never Smoker   Smokeless Tobacco     Never Used     Patient Active Problem List   Diagnosis     Paroxysmal Atrial Fibrillation     Essential Hypercholesterolemia     Arthralgias In Multiple Sites     Osteoarthritis Of The Knee     Osteopenia     Vitamin D Deficiency     Herpes Simplex Type I     Bradycardia by electrocardiogram     Status post ablation of atrial fibrillation     Foot pain, left     ALT (SGPT) level raised     RA (rheumatoid arthritis)     High risk medication use     Syncope     Flu     Postural dizziness with presyncope     Current Outpatient Prescriptions   Medication Sig Dispense Refill     ascorbic acid, vitamin C, (ASCORBIC ACID WITH ALEIDA HIPS) 500 MG tablet Take 500 mg by mouth daily.       aspirin 81 MG EC tablet Take 81 mg by mouth daily.       calcium citrate-vitamin D (CITRACAL+D) 315-200 mg-unit per tablet Take 1 tablet by mouth 2 (two) times a day.       cholecalciferol, vitamin D3, 1,000 unit tablet Take 1,000 Units by mouth daily.       folic acid (FOLVITE) 1 MG tablet Take 1 mg by mouth daily.        hydroxychloroquine (PLAQUENIL) 200 mg  "tablet Take 200 mg by mouth 2 (two) times a day.   6     methotrexate 2.5 MG tablet Take 10 mg by mouth once a week. 4 tabs on Sundays       No current facility-administered medications for this visit.      DETAILED EXAMINATION (six area) :  Vitals:    03/09/17 0822   BP: 114/66   Patient Site: Right Arm   Patient Position: Sitting   Cuff Size: Adult Regular   Pulse: 72   Weight: 146 lb 8 oz (66.5 kg)   Height: 5' 5\" (1.651 m)     Alert oriented. Head including the face is examined for malar rash, heliotropes, scarring, lupus pernio. Eyes examined for redness such as in episcleritis/scleritis, periorbital lesions.   Neck examined  for lymph nodes, range of motion Both upper and lower extremities (all four) examined for swollen, warm &/or  tender joints, range of motion, rash, muscle weakness, edema. The salient normal / abnormal findings are appended.   m   Her palpable appendicular joints show no evidence of synovitis, except the left second metatarsophalangeal joints where she has residual tenderness and swelling.. her gait is normal.          LAB / IMAGING DATA:  ALT   Date Value Ref Range Status   03/06/2017 25 0 - 45 U/L Final   02/16/2017 36 0 - 45 U/L Final   01/11/2017 25 0 - 45 U/L Final     ALBUMIN   Date Value Ref Range Status   03/06/2017 4.1 3.5 - 5.0 g/dL Final   02/16/2017 3.9 3.5 - 5.0 g/dL Final   01/11/2017 4.0 3.5 - 5.0 g/dL Final     CREATININE   Date Value Ref Range Status   03/06/2017 0.76 0.60 - 1.10 mg/dL Final   02/16/2017 0.78 0.60 - 1.10 mg/dL Final   01/11/2017 0.93 0.60 - 1.10 mg/dL Final       WBC   Date Value Ref Range Status   03/06/2017 4.2 4.0 - 11.0 thou/uL Final   02/21/2017 3.3 (L) 4.0 - 11.0 thou/uL Final   09/08/2015 6.7 4.0 - 11.0 thou/uL Final   07/07/2015 4.5 4.0 - 11.0 thou/uL Final     HEMOGLOBIN   Date Value Ref Range Status   03/06/2017 12.8 12.0 - 16.0 g/dL Final   02/21/2017 11.6 (L) 12.0 - 16.0 g/dL Final   02/17/2017 10.9 (L) 12.0 - 16.0 g/dL Final     PLATELETS "   Date Value Ref Range Status   03/06/2017 218 140 - 440 thou/uL Final   02/21/2017 147 140 - 440 thou/uL Final   02/17/2017 102 (L) 140 - 440 thou/uL Final       Lab Results   Component Value Date     (H) 02/18/2014    SEDRATE 14 04/27/2015

## 2021-06-09 NOTE — PROGRESS NOTES
Assessment:  This is a 61 y.o.female who presents to follow-up a recent overnight hospital admission for acute influenza A and near syncope in the setting of immunosuppression from methotrexate.  She has a history of paroxysmal atrial fibrillation post-ablation and was stable on telemetry overnight.      1. Leukopenia, unspecified type  HM1(CBC and Differential)    HM1 (CBC with Diff)   2. High risk medication use  Pneumococcal conjugate vaccine 13-valent 6wks-17yrs; >50yrs    Pneumococcal polysaccharide vaccine 23-valent greater than or equal to 3yo subcutaneous/IM   3. Immunosuppressed status  Pneumococcal conjugate vaccine 13-valent 6wks-17yrs; >50yrs    Pneumococcal polysaccharide vaccine 23-valent greater than or equal to 3yo subcutaneous/IM         Plan:   She will complete the course of Tamiflu.  We rechecked her CBC today and she is improving, with an increase of her white blood count from 2.3 to 3.3, and her hemoglobin from 10.9 to 11.6.  It has been recommended that she not take her methotrexate this week so that she can fully recover.  She usually takes it on Sundays.  Because she plans long-term immunosuppressive therapy with methotrexate, I recommend that once she has recovered from the flu that she receive a pneumococcal vaccination series.  She should receive the PCV 13 first.  That should be followed by the PCV 23 at least 8 weeks later.  She should receive influenza immunizations every fall.  She will follow-up here as needed.        Subjective:  This is a 61 y.o.female who presents for follow-up of a recent overnight hospitalization for acute influenza A.  She had developed symptoms of generalized achiness, cough, and nausea and vomiting.  She also had dizziness, and when she almost fainted she decided to go to the emergency room.  Testing confirmed acute influenza A.  She has a past history of paroxysmal atrial fibrillation post-ablation, so in light of the near syncope she was admitted  "overnight and monitored on telemetry and remained stable aside from mild asymptomatic bradycardia.  She underwent an echocardiogram which was normal.  She is on weekly methotrexate for rheumatoid arthritis.  Her white blood count was low at 2.3 and her hemoglobin was mildly low at 10.9.  She has been instructed not to take this week's methotrexate dose and a repeat CBC is planned today.  She states she does definitely feel better although she is still coughing.  She has been afebrile since discharge.  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    I personally reviewed the provider notes, labs, and imaging studies of her recent hospitalization.  I reviewed her immunization record.      Objective:      Visit Vitals     /80 (Patient Site: Right Arm, Patient Position: Sitting, Cuff Size: Adult Regular)     Pulse (!) 56     Temp 98  F (36.7  C) (Oral)     Ht 5' 5\" (1.651 m)     Wt 148 lb (67.1 kg)     Breastfeeding No     BMI 24.63 kg/m2     History   Smoking Status     Never Smoker   Smokeless Tobacco     Never Used       Physical Exam:   She is alert, appears fatigued but is in no respiratory distress.  She does still have an occasional dry cough.  Heart has a regular rate and rhythm without murmur, rub, or gallop.  Lungs are clear with good airflow, no wheezing, rales, or rhonchi.      Labs:  Results for orders placed or performed in visit on 02/21/17   HM1 (CBC with Diff)   Result Value Ref Range    WBC 3.3 (L) 4.0 - 11.0 thou/uL    RBC 3.87 3.80 - 5.40 mill/uL    Hemoglobin 11.6 (L) 12.0 - 16.0 g/dL    Hematocrit 33.8 (L) 35.0 - 47.0 %    MCV 87 80 - 100 fL    MCH 30.1 27.0 - 34.0 pg    MCHC 34.4 32.0 - 36.0 g/dL    RDW 12.2 11.0 - 14.5 %    Platelets 147 140 - 440 thou/uL    MPV 8.1 7.0 - 10.0 fL    Neutrophils % 50 50 - 70 %    Lymphocytes % 36 20 - 40 %    Monocytes % 11 (H) 2 - 10 %    Eosinophils % 2 0 - 6 %    Basophils % 1 0 - 2 %    " Neutrophils Absolute 1.6 (L) 2.0 - 7.7 thou/uL    Lymphocytes Absolute 1.2 0.8 - 4.4 thou/uL    Monocytes Absolute 0.4 0.0 - 0.9 thou/uL    Eosinophils Absolute 0.1 0.0 - 0.4 thou/uL    Basophils Absolute 0.0 0.0 - 0.2 thou/uL         Current Outpatient Prescriptions on File Prior to Visit   Medication Sig Dispense Refill     ascorbic acid, vitamin C, (ASCORBIC ACID WITH ALEIDA HIPS) 500 MG tablet Take 500 mg by mouth daily.       aspirin 81 MG EC tablet Take 81 mg by mouth daily.       calcium citrate-vitamin D (CITRACAL+D) 315-200 mg-unit per tablet Take 1 tablet by mouth 2 (two) times a day.       cholecalciferol, vitamin D3, 1,000 unit tablet Take 1,000 Units by mouth daily.       folic acid (FOLVITE) 1 MG tablet Take 1 mg by mouth daily.        hydroxychloroquine (PLAQUENIL) 200 mg tablet Take 200 mg by mouth 2 (two) times a day.   6     methotrexate 2.5 MG tablet Take 10 mg by mouth once a week. 4 tabs on Sundays       No current facility-administered medications on file prior to visit.      Medications were reviewed and reconciled today.      Sun Osuna M.D.      This note has been dictated using voice recognition software.  Any grammatical, typographical, or context distortions are unintentional and inherent to the software.

## 2021-06-11 NOTE — PROGRESS NOTES
ASSESSMENT AND PLAN:  Clarisse Dubon 61 y.o. female is here for follow-up of seropositive double, rheumatoid arthritis.  She has osteoarthritis.  She is doing great.  She is on methotrexate, hydroxychloroquine, folic acid these are renewed.  Her recent labs are reviewed within normal range.  Continue current plan.  Management principles of osteoarthritis reviewed.  She had her eyes examined recently.  Return for follow-up in 4 months labs every 2.        Diagnoses and all orders for this visit:    Seropositive rheumatoid arthritis of multiple sites  -     methotrexate 2.5 MG tablet; TAKE 4 TABLETS BY MOUTH ONCE EVERY WEEK  Dispense: 16 tablet; Refill: 1  -     hydroxychloroquine (PLAQUENIL) 200 mg tablet; Take 1 tablet (200 mg total) by mouth 2 (two) times a day.  Dispense: 60 tablet; Refill: 4    Cyclic citrullinated peptide (CCP) antibody positive    High risk medication use  -     folic acid (FOLVITE) 1 MG tablet; TAKE 1 TABLET(1 MG) BY MOUTH DAILY  Dispense: 30 tablet; Refill: 11    Osteoarthritis Of The Knee        HISTORY OF PRESENTING ILLNESS:  Clarisse Dubon 61 y.o.  is here for followup of  Rheumatoid Arthritis and osteoarthritis.  Her RA is associated with positive anti-CCP antibody of 58.4 and rheumatoid factor I 69.  Her osteoarthritis noted in the knee.  She is doing great with the current regimen.  Her eye examination for hydroxychloroquine toxicity monitoring earlier this year.  This was noted to be normal.  Joints affected include multiple joints. Her arthropathy presented 2 + years ago. Onset was gradual. Her rheumatoid arthritis  symptoms are stable.  Symptoms included joint pain, joint swelling and morning stiffness and were of moderate and severe severity.Symptoms are made worse by: nothing. Symptoms are helped by current regimen.  Patient denies associated alopecia, fevers, nodules, oral ulcers, rashes/photosensitive, Raynaud's and seizures.  Overall disease activity:  Stable.   There is no  morning stiffness.  She had fever and weight loss and eye redness and cough during recent hospitalization from which she has made good recovery.  Rheumatoid Arthritis Disease Activity Measure used: RAPID3, reviewed  today and scanned in the chart.  ALLERGIES:Review of patient's allergies indicates no known allergies.    PAST MEDICAL/ACTIVE PROBLEMS/MEDICATION/SOCIAL DATA  Past Medical History:   Diagnosis Date     Arthritis      Cellulitis of ankle     Right     Cervical dysplasia      Contact dermatitis and other eczema due to plants (except food)     Poison Ivy     Edema     due to arthritis     Herpes simplex     type 1     History of transfusion      Hyperlipidemia      Insufficiency fracture of tibia 11/20/2015    Right ankle insufficiency fracture     Paroxysmal Atrial Fibrillation     Diagnosed 1/2/14.  CHADS2 - VASc score = 1 (gender) ASA Rx Sotalol (bradycardia) Flecainide pre-ablation PVI Dec 2014        Sinus bradycardia      Ureteral stone     Left     Vitamin D deficiency      History   Smoking Status     Never Smoker   Smokeless Tobacco     Never Used     Patient Active Problem List   Diagnosis     Paroxysmal Atrial Fibrillation     Essential Hypercholesterolemia     Arthralgias In Multiple Sites     Osteoarthritis Of The Knee     Osteopenia     Vitamin D Deficiency     Herpes Simplex Type I     Bradycardia by electrocardiogram     Status post ablation of atrial fibrillation     Foot pain, left     High risk medication use     Syncope     Flu     Postural dizziness with presyncope     Seropositive rheumatoid arthritis of multiple sites     Cyclic citrullinated peptide (CCP) antibody positive     Current Outpatient Prescriptions   Medication Sig Dispense Refill     ascorbic acid, vitamin C, (ASCORBIC ACID WITH ALEIDA HIPS) 500 MG tablet Take 500 mg by mouth daily.       aspirin 81 MG EC tablet Take 81 mg by mouth daily.       calcium citrate-vitamin D (CITRACAL+D) 315-200 mg-unit per tablet Take 1 tablet  "by mouth 2 (two) times a day.       cholecalciferol, vitamin D3, 1,000 unit tablet Take 1,000 Units by mouth daily.       folic acid (FOLVITE) 1 MG tablet Take 1 mg by mouth daily.        folic acid (FOLVITE) 1 MG tablet TAKE 1 TABLET(1 MG) BY MOUTH DAILY 30 tablet 1     hydroxychloroquine (PLAQUENIL) 200 mg tablet Take 1 tablet (200 mg total) by mouth 2 (two) times a day. 60 tablet 4     methotrexate 2.5 MG tablet TAKE 4 TABLETS BY MOUTH ONCE EVERY WEEK 16 tablet 1     No current facility-administered medications for this visit.      DETAILED EXAMINATION  07/13/17  :  Vitals:    07/13/17 0837   BP: 122/72   Patient Site: Right Arm   Patient Position: Sitting   Cuff Size: Adult Regular   Pulse: 60   Weight: 150 lb 3.2 oz (68.1 kg)   Height: 5' 5\" (1.651 m)     Alert oriented. Head including the face is examined for malar rash, heliotropes, scarring, lupus pernio. Eyes examined for redness such as in episcleritis/scleritis, periorbital lesions.   Neck examined  for lymph nodes, range of motion Both upper and lower extremities (all four) examined for swollen, warm &/or  tender joints, range of motion, rash, muscle weakness, edema. The salient normal / abnormal findings are appended.  No appreciable synovitis in any of the palpable appendicular joints.             LAB / IMAGING DATA:  ALT   Date Value Ref Range Status   07/11/2017 23 0 - 45 U/L Final   05/08/2017 22 0 - 45 U/L Final   03/06/2017 25 0 - 45 U/L Final     Albumin   Date Value Ref Range Status   07/11/2017 3.6 3.5 - 5.0 g/dL Final   05/08/2017 3.9 3.5 - 5.0 g/dL Final   03/06/2017 4.1 3.5 - 5.0 g/dL Final     Creatinine   Date Value Ref Range Status   07/11/2017 0.69 0.60 - 1.10 mg/dL Final   05/08/2017 0.79 0.60 - 1.10 mg/dL Final   03/06/2017 0.76 0.60 - 1.10 mg/dL Final       WBC   Date Value Ref Range Status   07/11/2017 4.5 4.0 - 11.0 thou/uL Final   05/08/2017 5.2 4.0 - 11.0 thou/uL Final   09/08/2015 6.7 4.0 - 11.0 thou/uL Final   07/07/2015 4.5 4.0 " - 11.0 thou/uL Final     Hemoglobin   Date Value Ref Range Status   07/11/2017 12.8 12.0 - 16.0 g/dL Final   05/08/2017 12.9 12.0 - 16.0 g/dL Final   03/06/2017 12.8 12.0 - 16.0 g/dL Final     Platelets   Date Value Ref Range Status   07/11/2017 177 140 - 440 thou/uL Final   05/08/2017 189 140 - 440 thou/uL Final   03/06/2017 218 140 - 440 thou/uL Final       Lab Results   Component Value Date     (H) 02/18/2014    SEDRATE 14 04/27/2015

## 2021-06-12 NOTE — TELEPHONE ENCOUNTER
Medication Request  Medication name: antibiotic  Requested Pharmacy: [facility]  Reason for request: UTI  Patient states she was told her provider would send an antibiotic to the pharmacy for her.  Please advise.  Okay to leave a detailed message: yes

## 2021-06-12 NOTE — PATIENT INSTRUCTIONS - HE
Patient Stated Goal: Pass my stone  Symptom Control While Passing A Stone    The goal of [facility] is to let a smaller kidney stone (less than 4 to 5 mm) pass without intervention if possible. Giving your body a chance to clear the stone may take a few hours up to a few weeks.  Keeping you well-informed, safe and fairly comfortable is important.    Drink to thirst  Do not attempt to  flush out  your stone by drinking too much fluid. This does not work and may increase nausea. Drink enough to satisfy your body s thirst. Eating your normal diet is fine.   Medications (that may be suggested or prescribed)    Ibuprofen (Advil or Motrin) Available over the counter  o Take two (200mg) tablets every six hours until the stone passes.  o Prevents spasm of the ureter.    o Decreases pain.      Dramamine* (drowsy version, non-generic formulation) Available over the counter  o Take 50mg at bedtime  o Decreases spasms of the ureter  o Decreases nausea  o Decreases acute pain  o Decreases recurrence of pain for 24 hours  o Will help you sleep  *This medication will cause increase drowsiness, do not drive or operate machinery for 6 hours.      Narcotics (Percocet, Vicodin, Dilaudid) Take as prescribed for severe pain unrelieved by ibuprofen and Dramamine  o Narcotics have significant side effects and only  cover-up  pain. They have no effect on the cause of pain.  o Common side effects  - Confusion, disorientation and sedation - DO NOT DRIVE OR OPERATE MACHINERY WITHIN 24 HOURS  - Nausea - take Dramamine or Zofran or Haldol to help control  - Constipation  - Sleep disturbances      Ondansetron (Zofran) Take as prescribed  o Reserve for severe nausea  o May cause constipation, start over the counter Miralax if needed      Second Line Anti-Nausea Medication: Adding a different anti-nausea medication maybe helpful for persistent nausea.  The combined effect of different types of anti-nausea medications maybe more  effective than either medication by itself, even in higher doses.  o Compazine: Take as prescribed      Information about kidney stones    Crystals can form if chemicals are too concentrated in your urine. If the crystal grows over time, a stone may form. A stone usually isn t painful while it is still in the kidney.    As the stone begins to leave the kidney, you may experience episodes of flank pain as the kidney stone approaches the entrance to the ureter. Some people feel a vague ache in the side.    Kidney stones may fall into the ureter. Some stones are tiny and pass through without causing symptoms. The ureter is a small tube (approximately 1/8 of an inch wide). A kidney stone can get stuck and block the ureter. If this happens, urine backs up and flows back to the kidney. Back pressure on the kidney can cause:  o Severe flank pain radiating to the groin.  o Severe nausea and vomiting.  o The pain can occur in the lower back, side, groin or all three.      When the stone reaches the lower ureter, this can irritate the bladder and sensations of feeling the urge to urinate frequently and urgently may occur.      Once the stone passes out of your ureter and into your bladder, the symptoms of urgency and frequency will often disappear. Sometimes pain will come back for a short period and will not be as severe as before. The passage of the stone from your bladder and out of your body is usually not a problem. The urethra is at least twice as wide as the normal ureter, so the stone doesn t usually block it.    Strain all urine  If you pass the stone, save it. Place it in the container we have provided and bring it to the [facility] within a week of passing it. Your stone will then be sent for analysis which takes about a month.     Signs and symptoms you might experience    Nausea    Decreased appetite    Urinary frequency    Bloody urine     Chills    Fatigue    When to call [facility] or  go to the Emergency Room    Fever with a temperature greater than 100.1    Severe pain    Persistent nausea/vomiting    If the pain worsens or nausea/vomiting is uncontrolled with medications, STOP eating & drinking. You need to have an empty stomach for 8 hours prior to surgery. Call the [facility] immediately at [phone].           Follow-up    Low dose CT scan with doctor visit 1-2 weeks after initial clinic visit per doctor s instructions    Please cancel the CT scan visit if you pass a stone. Reschedule for a one month follow-up with doctor to discuss stone composition and future prevention.    Preventing future stones    Approximately a month after your stone is sent out for analysis, a prevention visit will occur with your provider, to discuss an individualized plan for prevention of new stones and to discuss managing stones that you may still have. Along with the analysis of the kidney stone, blood and urine tests may be indicated to develop this plan. Knowing the type of kidney stones you make, and why, allows the providers at the [facility] to recommend specific ways to prevent them.    Follow-up visits are an important part of monitoring and preventing future re-occurrences.    The [facility] is available for questions or concerns 24 hours a day at [phone]

## 2021-06-12 NOTE — TELEPHONE ENCOUNTER
New Appointment Needed  What is the reason for the visit:    Inpatient/ED Follow Up Appt Request  At what hospital or facility were you seen?: [facility]  What is the reason you were seen?: Kidney stones, also found abnormalities on chest from CT Image  What date were you admitted?: date: ER/ F/U 11/7/20  What date were you discharged?: date: ER/ F/U 11/7/20  What was the recommended timeframe for your follow up appointment?: 3 to 5 days  Provider Preference: PCP only, or recommended provider  How soon do you need to be seen?: 3 to 5 days  Waitlist offered?: no  Okay to leave a detailed message:  Please call pt, to schedule ER F/U at ph [phone]

## 2021-06-12 NOTE — PROGRESS NOTES
"Assessment/Plan:        Diagnoses and all orders for this visit:    Calculus of ureter  -     Symptom Control While Passing a Stone Education  -     Patient Stated Goal: Pass my stone  -     Stone Analysis- Standing; Standing  Follow-up 3 weeks without imaging    Stone Management Plan  [facility] 11/9/2020   Urinary Tract Infection Possible Infection   Renal Colic Well controlled symptoms   Renal Failure No suspicion of renal failure   Current CT date 11/7/2020   Right sided stones? Yes   R Number of ureteral stones 1   R GSD of ureteral stones 2   R Location of ureteral stone Distal   R Number of kidney stones  No renal stones   R Hydronephrosis Mild   R Stone Event New event   Diagnosis date 11/7/2020   Initial location of primary symptomatic stone Distal   Initial GSD of primary symptomatic stone 2   R MET status Initiation   R Current Plan MET   MET (No Data)   Left sided stones? No   L Stone Event No current event             Phone call duration: 22 minutes    Abdias Carpenter MD +    Subjective:        The patient has been notified of following:     \"This telephone visit will be conducted via a call between you and your physician/provider. We have found that certain health care needs can be provided without the need for a physical exam.  This service lets us provide the care you need with a short phone conversation. If a prescription is necessary, we can send it directly to your pharmacy.  If labs and/or imaging are needed, we can place orders so that you can have the test (s) done at a later time.    If during the course of the call the physician/provider feels a telephone visit is not appropriate, you will not be charged for this service.\"     HPI  Ms. Clarisse Dubon is a 64 y.o. female who is being evaluated via a billable telephone visit by [facility] with history of onset of right flank pain right back pain 11/7/2020 eventually accompanied by a feeling of " urgency and frequency and nausea..  She presented to the emergency room where following lab was obtained.  UA specific gravity 1.025 pH 6 greater than 100 RBCs few bacteria negative nitrate 10-25 WBCs sodium 138 potassium 4.2 calcium 8.9 creatinine 0.77 C-reactive protein less than 0.1 white blood count 5500 hemoglobin 12.7 urine culture preliminary 10,000-50,000 colonies , sensitivities pending.  Patient had history of E. coli 10-50,000 in 2018 that was pansensitive except for tetracycline.  Patient informed of this culture pending.    Personal review of CT scan obtained without oral but with IV contrast on 11/7/2020 demonstrated a 2 mm stone in the right distal ureter near the UVJ with mild hydroureteronephrosis.  No other stones could be identified.  There was a narrowed area in the sigmoid colon was suspicious for stricture.  There was also nonspecific groundglass attenuation basilar in the lungs seen on occasion with COVID-19 pneumonia and it was pointed out they can occur with a variety of infectious and noninfectious processes.    Patient comes at this time with symptoms of frequency having diminished.  She passed a small white structure that required the use of a toothpick to pick out of the strainer.  Unsure that this represents her stone.  Most likely not.    Impression right distal ureteral stone  Possible UTI sensitivities pending    Plan continue trial of passage with straining urine follow-up 3 weeks without imaging patient to hold off turning in what she has captured for another 2 weeks in the event that she does pass a stone that she identifies as such.  Patient does have history of passing stone at age 58 with no family history of stones.  Recommended consideration for stone risk studies in the future.  She will check with her insurance company to see if it is covered.    Recommended she check with her primary care physician regarding sigmoid colon lesion.  Patient wondered if she should have a  Covid antibody test in view of the pulmonary findings and again referred her to her primary care physician whether this should be done.           ROS   A 12 point comprehensive review of systems is negative except for HPI    Past Medical History:   Diagnosis Date     Arthritis      Cellulitis of ankle     Right     Cervical dysplasia      Contact dermatitis and other eczema due to plants (except food)     Poison Ivy     Edema     due to arthritis     Herpes simplex     type 1     History of transfusion      Hyperlipidemia      Insufficiency fracture of tibia 11/20/2015    Right ankle insufficiency fracture     Paroxysmal Atrial Fibrillation     Diagnosed 1/2/14.  CHADS2 - VASc score = 1 (gender) ASA Rx Sotalol (bradycardia) Flecainide pre-ablation PVI Dec 2014        Rheumatoid arthritis (H)      Rheumatoid arthritis (H)      Sinus bradycardia      Ureteral stone     Left     Vitamin D deficiency        Past Surgical History:   Procedure Laterality Date     BREAST BIOPSY Right 09/26/2007    Biopsy Breast Percutaneous Needle Core 7;  Comments: Benign results     CARDIAC ELECTROPHYSIOLOGY MAPPING AND ABLATION  12/17/14    Pulmonary vein isolation for AF     CERVIX LESION DESTRUCTION  June 1990    for ARAM I     DILATION AND CURETTAGE OF UTERUS      for menorrhagia, Hgb 5.7     LAPAROSCOPIC TOTAL HYSTERECTOMY       VAGINAL HYSTERECTOMY   07/25/2007    With A/P repair and enterocele repair for benign reasons       Current Outpatient Medications   Medication Sig Dispense Refill     acetaminophen (TYLENOL) 500 MG tablet Take 2 tablets (1,000 mg total) by mouth 4 (four) times a day for 7 days. 56 tablet 0     calcium citrate-vitamin D (CITRACAL+D) 315-200 mg-unit per tablet Take 1 tablet by mouth 2 (two) times a day.       cholecalciferol, vitamin D3, 1,000 unit tablet Take 1,000 Units by mouth daily.       dimenhyDRINATE (DRAMAMINE) 50 MG tablet Take 1 tablet (50 mg total) by mouth at bedtime for 7 days. 7 tablet 0      dimenhyDRINATE (DRAMAMINE) 50 MG tablet Take 1 tablet (50 mg total) by mouth 4 (four) times a day as needed. 28 tablet 0     ibuprofen (ADVIL,MOTRIN) 200 MG tablet Take 2 tablets (400 mg total) by mouth 4 (four) times a day for 7 days. 56 tablet 0     multivit-min/ferrous fumarate (MULTI VITAMIN ORAL) Take by mouth daily.       oxyCODONE (ROXICODONE) 5 MG immediate release tablet Take 1 tablet (5 mg total) by mouth every 4 (four) hours as needed for pain. Every 4-6 hours as needed if pain is not improved with acetaminophen and ibuprofen. 12 tablet 0     No current facility-administered medications for this visit.        No Known Allergies    Social History     Socioeconomic History     Marital status:      Spouse name: Not on file     Number of children: Not on file     Years of education: Not on file     Highest education level: Not on file   Occupational History     Not on file   Social Needs     Financial resource strain: Not on file     Food insecurity     Worry: Not on file     Inability: Not on file     Transportation needs     Medical: Not on file     Non-medical: Not on file   Tobacco Use     Smoking status: Never Smoker     Smokeless tobacco: Never Used   Substance and Sexual Activity     Alcohol use: Yes     Frequency: 4 or more times a week     Drug use: No     Sexual activity: Not on file   Lifestyle     Physical activity     Days per week: Not on file     Minutes per session: Not on file     Stress: Not on file   Relationships     Social connections     Talks on phone: Not on file     Gets together: Not on file     Attends [employer] service: Not on file     Active member of club or organization: Not on file     Attends meetings of clubs or organizations: Not on file     Relationship status: Not on file     Intimate partner violence     Fear of current or ex partner: Not on file     Emotionally abused: Not on file     Physically abused: Not on file     Forced sexual activity: Not on file   Other  Topics Concern     Not on file   Social History Narrative     Not on file       Family History   Problem Relation Age of Onset     Heart disease Mother      Diabetes type II Mother      Hypertension Mother      Heart attack Father      Heart disease Father      Diabetes type II Father      Hypertension Father      Atrial fibrillation Sister      Hypertension Brother      Heart attack Brother      Leukemia Brother      Heart disease Brother      Stroke Sister      Lupus Sister        Objective:      Physical Exam  There were no vitals filed for this visit.    Labs    Urinalysis POC (Office):  Nitrite, UA   Date Value Ref Range Status   11/07/2020 Negative Negative Final   06/13/2018 Negative Negative Final   04/11/2018 Negative Negative Final       Lab Urinalysis:  Blood, UA   Date Value Ref Range Status   11/07/2020 Large (!) Negative Final   06/13/2018 Moderate (!) Negative Final   04/11/2018 Moderate (!) Negative Final     Nitrite, UA   Date Value Ref Range Status   11/07/2020 Negative Negative Final   06/13/2018 Negative Negative Final   04/11/2018 Negative Negative Final     Leukocytes, UA   Date Value Ref Range Status   11/07/2020 Negative Negative Final   06/13/2018 Small (!) Negative Final   04/11/2018 Moderate (!) Negative Final     pH, UA   Date Value Ref Range Status   11/07/2020 6.0 4.5 - 8.0 Final   06/13/2018 7.0 5.0 - 8.0 Final   04/11/2018 5.5 5.0 - 8.0 Final

## 2021-06-13 NOTE — TELEPHONE ENCOUNTER
Clinical support following up with patient regarding ureteral stone trial of passage.  Patient states she has not seen a stone pass as of yet but is doing better.  She has a follow up with her Urologist at [facility] whom she has seen in the passed and will go from there.

## 2021-06-13 NOTE — PROGRESS NOTES
Assessment & Plan:     1. Nephrolithiasis     2. Abnormal CT scan, sigmoid colon  Ambulatory referral to Gastroenterology   3. Sigmoid stricture (H)     4. Abnormal CT of the chest  COVID-19 Virus (Coronavirus) Antibody & Titer Reflex   5. Urinary frequency  Culture, Urine    Urinalysis Macroscopic         ER records were personally reviewed. We reviewed the potential etiologies for her lung findings and respiratory symptoms and we will check COVID titers and UA/UC to rule out infection. We discussed potential etiologies for the lung findings, and we will see what the titers show. We discussed the potential significance of the bowel findings and I suspect that she will need a colonoscopy at some point, I am not sure that they would want to do it with the sigmoid narrowing. I would like to proceed with a GI consultation at this time.   We reviewed increased fluids, soft diet and she will call or return to clinic with any ongoing or worsening symptoms.       Subjective:           Clarisse Dubon is a 64 y.o. female who presents for follow up of recent ER visit for flank and abdominal pain. Onset was sudden, 9 days ago [place]. She passed a stone while in the ER and has had no further symptoms since that time. Pain was located in the right lower flank area with radiation to right lower quadrant. Associated symptoms: nausea, urinary frequency. The patient denies chills, dysuria, fever and hematuria. She had a previous episode of kidney stones 6 yrs ago, but suspected a bladder infection initially. She had a CT in the ER that showed ground glass opacities at the lung bases, and a focal area of narrowing in the distal sigmoid colon.        She was tested for COVID given the lung findings, and it was negative. She had been clearing her throat frequently but denies any significant cough or other respiratory symptoms. She has been following COVID precautions carefully.        She denies bowel  Patient : Rob Vale Age: 42 year old Sex: male   MRN: [id number] Encounter Date: 10/21/2020      History     Chief Complaint   Patient presents with   • Chest Pain Adult     intermittent CP mid sternal, HX heart burn   getting worse for 10 days.      HPI     Rob is a 42 year old male presenting to the ED today for evaluation of chest pain. He said he has had reflux all his adult life, with worsening of it this last year. For the last week the pain has been worse and realized he should probably be evaluated for it after waking up this morning with chest pain still. He points to the pain in the middle of his chest, substernal and deep. Originally intermittent without association to food or exertion. Deep breathing provides some relief. He usually takes an antacid for relief, but has not helped this pain. He was provided 4 baby ASA prior to arrival to the ED.    His mother had a heart attack at 42 y/o.    He denies N/V, but states he feels mildly short of breath.     No Known Allergies    Discharge Medication List as of 10/21/2020 11:51 AM      Prior to Admission Medications    Details   ALPRAZolam ER (XANAX XR) 2 MG extended release tablet TK 1 T PO QAMHistorical Med      ALPRAZolam (XANAX) 0.25 MG tablet TK 1 T PO QID PRNHistorical Med      Viibryd 40 MG tablet TK 1 T PO QAMHistorical Med, SAMANTHA      diclofenac (VOLTAREN) 1 % gel Apply 4 g topically 4 times daily for 14 days. Apply to the affected areaEprescribe, Topical, 4 TIMES DAILY, Disp-400 g,R-1For joints of the upper extremities: Recommended dose is 2 grams to the affected area. For joints of the lower extremities: Recom mended dose is 4 grams to the affected area.      sumatriptan (IMITREX) 100 MG tablet TAKE 1 TABLET BY MOUTH AT ONSET OF MIGRAINE. MAY REPEAT AFTER 2 HOURS AS NEEDEDEprescribe, Disp-9 tablet,R-3      alfuzosin (UROXATRAL) 10 MG 24 hr tablet TAKE 1 TABLET BY MOUTH EVERY DAYEprescribe, Disp-90 tablet,R-0Patient must schedule a follow up in  November 2020 to obtain any further refills.      prochlorperazine (COMPAZINE) 10 MG tablet TAKE 1 TABLET BY MOUTH EVERY 8 HOURS AS NEEDED FOR NAUSEA OR VOMITINGEprescribe, Disp-30 tablet, R-3      oxybutynin (DITROPAN-XL) 5 MG 24 hr tablet Take 1 tablet by mouth daily.Eprescribe, Disp-90 tablet, R-3      tadalafil (CIALIS) 20 MG tablet Take 1 tablet by mouth daily. Max dose is 20 mg in 24 hours. CASH ONLYNormal, Disp-30 tablet, R-6      naproxen (NAPROSYN) 500 MG tablet Take 1 tablet by mouth daily at onset of migraine as neededEprescribe, Disp-60 tablet, R-3      ADDERALL XR 20 MG 24 hr capsule Take 20 mg by mouth daily (before breakfast).Historical Med, R-0, SAMANTHA      lamoTRIgine (LAMICTAL) 150 MG tablet Take 150 mg by mouth 2 times daily.Historical Med             Past Medical History:   Diagnosis Date   • ADD (attention deficit disorder)    • Anxiety    • Benign prostatic hypertrophy with lower urinary tract symptoms (LUTS) 5/27/2014   • Depressive disorder, not elsewhere classified    • Enlarged prostate    • Esophageal reflux     resolved   • Hemorrhoids 05/08/2019    per colonoscopy   • Other forms of migraine, with intractable migraine, so stated, without mention of status migrainosus    • Shoulder pain, right jan 2016    coming for surgery       Past Surgical History:   Procedure Laterality Date   • ANESTH,KNEE ARTHROSCOPY      left   • CARPAL TUNNEL RELEASE  11/2014    right hand   • COLONOSCOPY DIAGNOSTIC  05/08/2019    Colon 10 yrs,Hemorrhoids,.    • ESOPHAGOGASTRODUODENOSCOPY TRANSORAL FLEX DIAG     • ORAL SURGERY PROCEDURE  1999    [place] Teeth Extraction   • SHOULDER ARTHROSCOPY Right        Family History   Problem Relation Age of Onset   • Cancer Mother         ovarian   • Migraine Mother    • Musculoskeletal Mother         fibromyagia   • Heart Mother         ASCAD, MI - age 43   • Alcohol Abuse Father    • Asthma Father    • Heart Father         MI x 2, aortic valve replacement   •  changes, and she has not had a colonoscopy previously. She has had mild LLQ pain on/off in retrospect, but did not think much of it.       The following portions of the patient's history were reviewed and updated as appropriate: allergies, current medications, past family history, past medical history, past social history, past surgical history and problem list.    Review of Systems  A 12 point comprehensive review of systems was negative except as noted.       Objective:     Vitals:    11/16/20 0944   BP: 102/70   Patient Position: Sitting   Cuff Size: Adult Regular   Pulse: 65   SpO2: 98%   Weight: 137 lb 9 oz (62.4 kg)      Body mass index is 22.2 kg/m .   Physical Exam:  GEN: Alert and oriented, NAD,  well nourished  SKIN:  Normal skin turgor, no lesions/rashes   HEENT: moist mucous membranes, no rhinorrhea.    NECK: Normal.  No adenopathy or thyromegaly.  CV: Regular rate and rhythm, no murmurs.   LUNGS: Clear to auscultation bilaterally.    ABDOMEN: Soft, non-tender, non-distended, no masses   EXTREMITY: No edema, cyanosis  NEURO: Grossly normal.      Results for orders placed or performed in visit on 11/16/20   Culture, Urine    Specimen: Urine   Result Value Ref Range    Culture No Growth    Urinalysis Macroscopic   Result Value Ref Range    Color, UA Yellow Colorless, Yellow, Straw, Light Yellow    Clarity, UA Clear Clear    Glucose, UA Negative Negative    Bilirubin, UA Negative Negative    Ketones, UA Negative Negative    Specific Gravity, UA 1.015 1.005 - 1.030    Blood, UA Moderate (!) Negative    pH, UA 5.5 5.0 - 8.0    Protein, UA Negative Negative mg/dL    Urobilinogen, UA 0.2 E.U./dL 0.2 E.U./dL, 1.0 E.U./dL    Nitrite, UA Negative Negative    Leukocytes, UA Negative Negative           Genitourinary Father         prostate issues   • Arthritis Maternal Grandmother    • Migraine Maternal Grandmother    • Hypertension Maternal Grandmother    • Neurological Disorder Maternal Grandmother         dementia at older age   • Arthritis Maternal Grandfather    • Hypertension Maternal Grandfather    • Arthritis Paternal Grandmother    • Arthritis Paternal Grandfather    • Arthritis Paternal Aunt    • Ophthalmology Maternal Aunt         armd       Social History     Tobacco Use   • Smoking status: Never Smoker   • Smokeless tobacco: Never Used   Substance Use Topics   • Alcohol use: Yes     Alcohol/week: 2.0 standard drinks     Types: 1 Glasses of wine, 1 Standard drinks or equivalent per week   • Drug use: No       E-cigarette/Vaping     E-Cigarette/Vaping Substances & Devices       Review of Systems   Constitutional: Negative for chills, diaphoresis and fever.   Respiratory: Positive for shortness of breath (mild). Negative for cough and chest tightness.    Cardiovascular: Positive for chest pain. Negative for palpitations.   Gastrointestinal: Negative for abdominal pain, nausea and vomiting.   Genitourinary: Negative for decreased urine volume and dysuria.   Skin: Negative for color change and pallor.   Neurological: Negative for dizziness, light-headedness and headaches.       Physical Exam     ED Triage Vitals   ED Triage Vitals Group      Temp 10/21/20 1039 98.7 °F (37.1 °C)      Heart Rate 10/21/20 1037 70      Resp 10/21/20 1039 18      BP 10/21/20 1037 126/71      SpO2 10/21/20 1037 97 %      EtCO2 mmHg --       Height --       Weight 10/21/20 1039 180 lb (81.6 kg)      Weight Scale Used --       BMI (Calculated) --       IBW/kg (Calculated) --        Physical Exam   Constitutional: He is oriented to person, place, and time. He appears well-developed and well-nourished.  Non-toxic appearance. He does not have a sickly appearance. No distress.   HENT:   Head: Normocephalic and atraumatic.    Mouth/Throat: Uvula is midline and mucous membranes are normal. Mucous membranes are not dry. No posterior oropharyngeal erythema.   Eyes: Conjunctivae and EOM are normal. Right eye exhibits no discharge. Left eye exhibits no discharge. No scleral icterus.   Neck: Trachea normal, normal range of motion and full passive range of motion without pain. Neck supple. No JVD present. No tracheal deviation present.   Cardiovascular: Normal rate, regular rhythm, normal heart sounds, intact distal pulses and normal pulses. Exam reveals no gallop and no friction rub.   No murmur heard.      Pulmonary/Chest: Effort normal and breath sounds normal. No accessory muscle usage or stridor. No tachypnea. No respiratory distress. He has no wheezes. He has no rales. He exhibits no tenderness.   Abdominal: Soft. Normal appearance and bowel sounds are normal. He exhibits no distension. There is no splenomegaly or hepatomegaly. There is no abdominal tenderness. There is no rigidity, no rebound, no guarding, no CVA tenderness, no tenderness at McBurney's point and negative Loving's sign.   Musculoskeletal: Normal range of motion.         General: No tenderness or edema.   Neurological: He is alert and oriented to person, place, and time. He is not disoriented. Coordination normal.   Skin: Skin is warm, dry and intact. No ecchymosis and no rash noted. He is not diaphoretic. No cyanosis or erythema. No pallor.   Psychiatric: He has a normal mood and affect. His behavior is normal. Judgment and thought content normal.   Nursing note and vitals reviewed.      ED Course     Procedures    Lab Results     Results for orders placed or performed during the hospital encounter of 10/21/20   Comprehensive Metabolic Panel   Result Value Ref Range    Fasting Status      Sodium 138 135 - 145 mmol/L    Potassium 4.2 3.4 - 5.1 mmol/L    Chloride 104 98 - 107 mmol/L    Carbon Dioxide 28 21 - 32 mmol/L    Anion Gap 10 10 - 20 mmol/L    Glucose 98 65 - 99  mg/dL    BUN 20 6 - 20 mg/dL    Creatinine 1.08 0.67 - 1.17 mg/dL    Glomerular Filtration Rate 85 (L) >90 mL/min/1.73m2    BUN/ Creatinine Ratio 19 7 - 25    Bilirubin, Total 0.4 0.2 - 1.0 mg/dL    GOT/AST 29 <=37 Units/L    Alkaline Phosphatase 91 45 - 117 Units/L    Albumin 4.0 3.6 - 5.1 g/dL    Protein, Total 7.2 6.4 - 8.2 g/dL    Globulin 3.2 2.0 - 4.0 g/dL    A/G Ratio 1.3 1.0 - 2.4    GPT/ALT 25 <64 Units/L    Calcium 8.6 8.4 - 10.2 mg/dL   Troponin I Ultra Sensitive   Result Value Ref Range    Troponin I, Ultra Sensitive <0.02 <=0.04 ng/mL   CBC with Automated Differential (performable only)   Result Value Ref Range    WBC 5.3 4.2 - 11.0 K/mcL    RBC 5.58 4.50 - 5.90 mil/mcL    HGB 16.4 13.0 - 17.0 g/dL    HCT 47.8 39.0 - 51.0 %    MCV 85.7 78.0 - 100.0 fl    MCH 29.4 26.0 - 34.0 pg    MCHC 34.3 32.0 - 36.5 g/dL    RDW-CV 12.5 11.0 - 15.0 %     140 - 450 K/mcL    NRBC 0 <=0 /100 WBC    Neutrophil, Percent 63 %    Lymphocytes, Percent 27 %    Mono, Percent 7 %    Eosinophils, Percent 3 %    Basophils, Percent 0 %    Immature Granulocytes 0 %    Absolute Neutrophils 3.3 1.8 - 7.7 K/mcL    Absolute Lymphocytes 1.5 1.0 - 4.8 K/mcL    Absolute Monocytes 0.4 0.3 - 0.9 K/mcL    Absolute Eosinophils  0.1 0.1 - 0.5 K/mcL    Absolute Basophils 0.0 0.0 - 0.3 K/mcL    Absolute Immmature Granulocytes 0.0 0.0 - 0.2 K/mcL    RDW-SD 38.8 (L) 39.0 - 50.0 fL       EKG Results     EKG Interpretation:  ECG shows NSR with no FAREED  Rate: 69  Rhythm: normal sinus rhythm   Abnormality: no    EKG tracing preliminarily interpreted by ED physician    Radiology Results     Imaging Results          XR Chest AP or PA (Final result)  Result time 10/21/20 10:54:40    Final result                 Impression:    IMPRESSION: No acute cardiopulmonary disease.             Narrative:    EXAM: XR CHEST AP OR PA    CLINICAL HISTORY: Chest pain    TECHNIQUE: Portable chest.    COMPARISON: None.    FINDINGS: The heart and pulmonary vessels are  normal. Lungs are clear.  There is no infiltrate or effusion.                                ED Medication Orders (From admission, onward)    Ordered Start     Status Ordering Provider    10/21/20 1102 10/21/20 1103  pantoprazole (PROTONIX INJECT) injection 40 mg  ONCE      Last MAR action: Given OSKAR ZENG    10/21/20 1102 10/21/20 1103  alum-mag hydroxide+simethicone/lidocaine viscous (2:1) (MAGIC MOUTHWASH/GI COCKTAIL) (compounded) oral suspension 15 mL  ONCE      Last MAR action: Given OSKAR ZENG               RAMOS Rivas is a 42 year old male with history of GERD with unresolved chest pain for a week. History of worsening GERD and presentation of chest pain without exertion or risk factors for MI, gastritis or GERD are more likely. However, MI workup (EKG, troponin) warranted due to FHx of maternal MI at 44 y/o. EKG and troponin both unremarkable. A 2 hour repeat troponin was not ordered because of timeframe of presenting chest pain. A GI cocktail was ordered and provided relief. Pt was offered in-patient or out-patient cardiac stress test and opted for out-patient.    Clinical Impression     ED Diagnosis   1. Chest pain, unspecified type  Stress test       Disposition        Discharge 10/21/2020 11:40 AM  Rob Vale discharge to home/self care.              On 10/21/2020, Nilda NAIDU scribed the services personally performed by Dr. Karri Zeng:  Patient seen and examined by me.  Case discussed with PAYTON Harmon PTA.     Patient reports 10 days of intermittent chest pain with constant pain since 6:00 a.m..  He describes it as a pressure in his anterior chest.  He does have a long history of acid reflux and had tried Tums which did help in the past but today became concerned because the pain was more persistent.  He went to the clinic and then was referred here.  Initial report was that his EKG was abnormal the paramedics report with replacement of the leads his EKG was normal prior  to arrival and he was given option of coming in a private car which he did.    Patient reports his discomfort is a 3/10 on arrival.  He was given a GI cocktail and Protonix with relief.  He does have a significant family history his mother has a heart attack at age 43 and  of ovarian cancer in her 50s.  Patient does not have any history of high cholesterol or hypertension or diabetes.  He does not smoke.  He states his pain is relieved and I did offer him admission for further evaluation.  He would prefer outpatient workup especially in the current climate of the coronavirus pandemic he would have like to avoid hospitalization after verbalizing understanding of the risks and benefits.  He was referred for an outpatient treadmill stress test.  He is aware he will need a coronavirus screening before that test and will take an enteric-coated baby aspirin daily as well as Prilosec daily.  He will was not given nitroglycerin since he does take Cialis for BPH every other day and his last dose was yesterday.    He was discharged in improved condition verbalizing understanding of importance of follow-up      Diagnosis;  The encounter diagnosis was Chest pain, unspecified type.    Follow Up:  Waqas Nieto MD  [id number]  [place] [address]  [phone]    Schedule an appointment as soon as possible for a visit       Schedule Treadmill stress test  You may need a Coronavirus screening test prior to your appointment           Discharge Medication List as of 10/21/2020 11:51 AM          Disposition:  Discharge 10/21/2020 11:40 AM  Rob Vale discharge to home/self care.        The documentation recorded by the scribe accurately and completely reflects the service(s) I personally performed and the decisions made by me.            Loulou Zeng MD  10/21/20 1227   Heart score 1    Pt was advised that Coronary artery disease cannot be excluded based on ER evaluation alone.  Pt was offered hospitalization but  would strongly prefer outpatient follow up.  Pt advised on risks and benefits and importance of follow up.     Loulou Zeng MD  10/21/20 1228       Loulou Zeng MD  10/21/20 1227

## 2021-06-14 NOTE — PROGRESS NOTES
ASSESSMENT AND PLAN:  Clarisse Dubon 61 y.o. female is a for follow-up of seropositive, high titer anti-CCP antibody, rheumatoid arthritis, osteoarthritis, on methotrexate and hydroxychloroquine she is doing great.  She will continue as now management principles of osteoarthritis reviewed.  Return for follow-up this time in 6 months with labs every 3 months.  Refills provided.              Diagnoses and all orders for this visit:    Osteoarthritis Of The Knee    Seropositive rheumatoid arthritis of multiple sites  -     methotrexate 2.5 MG tablet; Take 4 tablets (10 mg total) by mouth once a week.  Dispense: 48 tablet; Refill: 0  -     hydroxychloroquine (PLAQUENIL) 200 mg tablet; Take 1 tablet (200 mg total) by mouth 2 (two) times a day.  Dispense: 180 tablet; Refill: 0    High risk medication use    Cyclic citrullinated peptide (CCP) antibody positive      HISTORY OF PRESENTING ILLNESS:  Clarisse Dubon 61 y.o.  is here for followup of  Rheumatoid Arthritis and osteoarthritis.  Her rheumatoid arthritis is associated with both anti-CCP antibody and rheumatoid factor.  She is on methotrexate and hydroxychloroquine.  She had her eyes examined in April.  She noted pain level of 0.5/10.  She is able to do all her day-to-day activities without difficulty.  There is no associated swelling or discomfort in the small joints of the hands or wrists the elbows shoulders hips knees ankles feet.  She has noted no pain in the neck and back.  She noted no stiffness in the morning associated with this.  There is no fever or weight loss blurry vision mouth also nausea cough or rash.  Occasionally she gets redness of the eyes.  There is no discomfort associated with it.  Her recent labs are reviewed and within normal range.  Her symptoms presented almost 3+ years ago now.  She is not aware of history of rheumatoid arthritis in her family.  Rheumatoid Arthritis Disease Activity Measure used: RAPID3, reviewed  today and scanned in the  chart.  ALLERGIES:Review of patient's allergies indicates no known allergies.    PAST MEDICAL/ACTIVE PROBLEMS/MEDICATION/SOCIAL DATA  Past Medical History:   Diagnosis Date     Arthritis      Cellulitis of ankle     Right     Cervical dysplasia      Contact dermatitis and other eczema due to plants (except food)     Poison Ivy     Edema     due to arthritis     Herpes simplex     type 1     History of transfusion      Hyperlipidemia      Insufficiency fracture of tibia 11/20/2015    Right ankle insufficiency fracture     Paroxysmal Atrial Fibrillation     Diagnosed 1/2/14.  CHADS2 - VASc score = 1 (gender) ASA Rx Sotalol (bradycardia) Flecainide pre-ablation PVI Dec 2014        Sinus bradycardia      Ureteral stone     Left     Vitamin D deficiency      History   Smoking Status     Never Smoker   Smokeless Tobacco     Never Used     Patient Active Problem List   Diagnosis     Paroxysmal Atrial Fibrillation     Essential Hypercholesterolemia     Arthralgias In Multiple Sites     Osteoarthritis Of The Knee     Osteopenia     Vitamin D Deficiency     Herpes Simplex Type I     Bradycardia by electrocardiogram     Status post ablation of atrial fibrillation     Foot pain, left     High risk medication use     Syncope     Flu     Postural dizziness with presyncope     Seropositive rheumatoid arthritis of multiple sites     Cyclic citrullinated peptide (CCP) antibody positive     Current Outpatient Prescriptions   Medication Sig Dispense Refill     ascorbic acid, vitamin C, (ASCORBIC ACID WITH ALEIDA HIPS) 500 MG tablet Take 500 mg by mouth daily.       aspirin 81 MG EC tablet Take 81 mg by mouth daily.       calcium citrate-vitamin D (CITRACAL+D) 315-200 mg-unit per tablet Take 1 tablet by mouth 2 (two) times a day.       cholecalciferol, vitamin D3, 1,000 unit tablet Take 1,000 Units by mouth daily.       folic acid (FOLVITE) 1 MG tablet TAKE 1 TABLET(1 MG) BY MOUTH DAILY 30 tablet 11     hydroxychloroquine (PLAQUENIL) 200  mg tablet Take 1 tablet (200 mg total) by mouth 2 (two) times a day. 60 tablet 4     methotrexate 2.5 MG tablet Take 4 tablets (10 mg total) by mouth once a week. 16 tablet 1     No current facility-administered medications for this visit.      DETAILED EXAMINATION  11/16/17  :  Vitals:    11/16/17 1323   BP: 128/80   Pulse: 64   Weight: 153 lb (69.4 kg)     Alert oriented. Head including the face is examined for malar rash, heliotropes, scarring, lupus pernio. Eyes examined for redness such as in episcleritis/scleritis, periorbital lesions.   Neck/ Face examined for parotid gland swelling, range of motion of neck.  Left upper and lower and right upper and lower extremities examined for tenderness, swelling, warmth of the appendicular joints, range of motion, edema, rash.  Some of the important findings included: There is no synovitis in any of the palpable appendicular joint.  She has full range of motion the shoulders.  No swelling tenderness in the knees, ankles.                LAB / IMAGING DATA:  ALT   Date Value Ref Range Status   11/13/2017 24 0 - 45 U/L Final   09/13/2017 20 0 - 45 U/L Final   07/11/2017 23 0 - 45 U/L Final     Albumin   Date Value Ref Range Status   11/13/2017 4.0 3.5 - 5.0 g/dL Final   09/13/2017 4.0 3.5 - 5.0 g/dL Final   07/11/2017 3.6 3.5 - 5.0 g/dL Final     Creatinine   Date Value Ref Range Status   11/13/2017 0.75 0.60 - 1.10 mg/dL Final   09/13/2017 0.85 0.60 - 1.10 mg/dL Final   07/11/2017 0.69 0.60 - 1.10 mg/dL Final       WBC   Date Value Ref Range Status   11/13/2017 5.3 4.0 - 11.0 thou/uL Final   09/13/2017 4.9 4.0 - 11.0 thou/uL Final   09/08/2015 6.7 4.0 - 11.0 thou/uL Final   07/07/2015 4.5 4.0 - 11.0 thou/uL Final     Hemoglobin   Date Value Ref Range Status   11/13/2017 12.9 12.0 - 16.0 g/dL Final   09/13/2017 12.2 12.0 - 16.0 g/dL Final   07/11/2017 12.8 12.0 - 16.0 g/dL Final     Platelets   Date Value Ref Range Status   11/13/2017 213 140 - 440 thou/uL Final    09/13/2017 193 140 - 440 thou/uL Final   07/11/2017 177 140 - 440 thou/uL Final       Lab Results   Component Value Date     (H) 02/18/2014    SEDRATE 14 04/27/2015

## 2021-06-15 PROBLEM — R76.8 CYCLIC CITRULLINATED PEPTIDE (CCP) ANTIBODY POSITIVE: Status: ACTIVE | Noted: 2017-03-09

## 2021-06-15 NOTE — PROGRESS NOTES
1. Knee pain  XR Knee Right 1 or 2 VWS   2. Fall, initial encounter  XR Knee Right 1 or 2 VWS     Med list reconciled      ASSESSMENT/PLAN:     The following high BMI interventions were performed this visit: weight monitoring    1. Knee pain    - XR Knee Right 1 or 2 VWS; Future    2. Fall, initial encounter    - XR Knee Right 1 or 2 VWS; Future      There are no Patient Instructions on file for this visit.  Medications Discontinued During This Encounter   Medication Reason     aspirin 81 MG EC tablet Therapy completed     aspirin 81 mg chewable tablet Therapy completed     Patient instructed to rest, ice the affected area several times per day for 10 minutes at a time, may use compression wrap to the area if pain and swelling occur and elevated the affected area whenever patient is able. May take Tylenol 1000 mg four times per day or Ibuprofen 800 mg three times daily for pain and inflammation.     We will notify patient of x-ray results once they become available.  Should follow back up in the clinic if her knee pain persists or become severe the next 7-10 days.    The visit lasted a total of 25 minutes face to face with the patient.  Over 50% of the time spent counseling and educating the patient about above content.      Mandie Mcintosh NP          SUBJECTIVE:  Clarisse Dubon is a 62 y.o. female who presents with right knee pain after she sustained a fall at home while carrying her laundry out of her laundry room.  She had a large amount of clothing in her arms and did not see where she was stepping and ended up falling onto the right knee on the concrete.  She did injure her left lower extremity during the fall as well.  She states the pain has been constant and describes it as a throbbing sensation.  Her left lower extremity and right knee are extremely bruised and swollen today.  Aggravating factors: Stairs.  Relieving factors: Tylenol and compression.  She has been icing the site.  She is rating her  right knee and lower left leg pain a 2 out of 10 today.  She does have a history of arthritis and is borderline osteoporotic per her rheumatologist to recommend that she follow-up today in the clinic to have her knee x-rayed.  Currently not on blood thinning medications, she does have a history of atrial fib that has resolved since her PVI ablation in 2014.  Chief Complaint   Patient presents with     Fall     fell Saturday and injured RT knee and left lower leg/bruising, swelling         Patient Active Problem List   Diagnosis     Paroxysmal Atrial Fibrillation     Essential Hypercholesterolemia     Arthralgias In Multiple Sites     Osteoarthritis Of The Knee     Osteopenia     Vitamin D Deficiency     Herpes Simplex Type I     Bradycardia by electrocardiogram     Status post ablation of atrial fibrillation     Foot pain, left     High risk medication use     Syncope     Flu     Postural dizziness with presyncope     Seropositive rheumatoid arthritis of multiple sites     Cyclic citrullinated peptide (CCP) antibody positive       Current Outpatient Prescriptions   Medication Sig Dispense Refill     ascorbic acid, vitamin C, (ASCORBIC ACID WITH ALEIDA HIPS) 500 MG tablet Take 500 mg by mouth daily.       aspirin 81 MG EC tablet Take 81 mg by mouth daily.       calcium citrate-vitamin D (CITRACAL+D) 315-200 mg-unit per tablet Take 1 tablet by mouth 2 (two) times a day.       cholecalciferol, vitamin D3, 1,000 unit tablet Take 1,000 Units by mouth daily.       folic acid (FOLVITE) 1 MG tablet TAKE 1 TABLET(1 MG) BY MOUTH DAILY 30 tablet 11     hydroxychloroquine (PLAQUENIL) 200 mg tablet Take 1 tablet (200 mg total) by mouth 2 (two) times a day. 180 tablet 0     methotrexate 2.5 MG tablet Take 4 tablets (10 mg total) by mouth once a week. 48 tablet 0     No current facility-administered medications for this visit.        History   Smoking Status     Never Smoker   Smokeless Tobacco     Never Used       REVIEW OF  SYSTEMS: Denies  locking, clicking, giving away, fevers, chills, sweating, rash or warmth.      TOBACCO USE:  History   Smoking Status     Never Smoker   Smokeless Tobacco     Never Used     Social History     Social History     Marital status:      Spouse name: N/A     Number of children: N/A     Years of education: N/A     Occupational History     Not on file.     Social History Main Topics     Smoking status: Never Smoker     Smokeless tobacco: Never Used     Alcohol use No     Drug use: No     Sexual activity: Not on file     Other Topics Concern     Not on file     Social History Narrative         OBJECTIVE:            Vitals:    01/18/18 1525   BP: 112/78   Pulse: 61   Resp: 14   Temp: 97.6  F (36.4  C)   SpO2: 99%     Weight: 153 lb (69.4 kg)  Wt Readings from Last 3 Encounters:   01/18/18 153 lb (69.4 kg)   11/16/17 153 lb (69.4 kg)   07/13/17 150 lb 3.2 oz (68.1 kg)     Body mass index is 25.46 kg/(m^2).        Physical Exam:  GENERAL APPEARANCE: A&A, NAD, well hydrated, well nourished  NECK: Supple, without lymphadenopathy, no thyroid mass, no JVD, no bruit  CV: RRR, no M/G/R   LUNGS: CTAB, normal respiratory effort  ABDOMEN: S&NT, no masses, no organomegaly, BS present x4   EXTREMITY: Left lower extremity with golf ball size hematoma on shin, surrounded by moderate amount of bruising.  She does have full sensation and is able to bear full weight.  Right knee with moderate amount of swelling, large hematoma just below the knee, but amount of bruising, no effusions, she does have full sensation.  Bilateral pedal and posttibial pulses intact, 2.  She does have full range of motion with flexion and extension.  SKIN:  Normal skin turgor, no lesions/rashes, warm and dry

## 2021-06-16 PROBLEM — R30.0 DYSURIA: Status: ACTIVE | Noted: 2018-04-11

## 2021-06-16 PROBLEM — B37.31 YEAST INFECTION OF THE VAGINA: Status: ACTIVE | Noted: 2018-06-13

## 2021-06-16 PROBLEM — L24.7 CONTACT DERMATITIS AND ECZEMA DUE TO PLANT: Status: ACTIVE | Noted: 2018-06-03

## 2021-06-16 PROBLEM — L03.90 CELLULITIS: Status: ACTIVE | Noted: 2018-06-03

## 2021-06-16 PROBLEM — R35.0 FREQUENT URINATION: Status: ACTIVE | Noted: 2018-06-13

## 2021-06-16 PROBLEM — T14.8XXA MUSCLE STRAIN: Status: ACTIVE | Noted: 2018-10-03

## 2021-06-16 PROBLEM — L03.211 FACIAL CELLULITIS: Status: ACTIVE | Noted: 2018-06-03

## 2021-06-16 NOTE — PROGRESS NOTES
"ASSESSMENT AND PLAN:  Clarisse Dubon 62 y.o. female is seen here on 03/07/18 for evaluation of acutely worsening pain in her right knee.  This is likely combination of osteoarthritis, rheumatoid arthritis and would like to the earlier.  This is outlined to her.  Her recent fall led to x-rays of the knee and the right side which shows degenerative changes.  Personally reviewed those films.  Given the rest of her joints are doing so grade 1 of the option would be to inject this with corticosteroids.  She wants to proceed.  40 mg of Kenalog injected the right knee anterolateral approach with Marcaine.  Diagnoses and all orders for this visit:    Seropositive rheumatoid arthritis of multiple sites  -     triamcinolone acetonide 40 mg/mL injection 40 mg (KENALOG-40); Inject 1 mL (40 mg total) into the joint once.    Osteoarthritis Of The Knee  -     triamcinolone acetonide 40 mg/mL injection 40 mg (KENALOG-40); Inject 1 mL (40 mg total) into the joint once.    Acute pain of right knee  -     triamcinolone acetonide 40 mg/mL injection 40 mg (KENALOG-40); Inject 1 mL (40 mg total) into the joint once.          HISTORY OF PRESENTING ILLNESS ON 03/07/18 :  Clarisse Dubon 62 y.o. is here for a moderately severe flare up of pain.  She has seropositive severe rheumatoid arthritis, she had taken a fall a few weeks ago.  She was \"all black and blue\" in her lower extremities.  She had x-rays taken.  She made excellent improvement only the past couple of weeks her right knee has been troubling her more.  This is painful with weightbearing.  The pain is more on the medial aspect.  This is moderately severe.  This keeps her up at night when she tries to turn the light one way or the other the pain is significant.  She has difficulty performing some of the day-to-day activities.  She has stiffness in the back of the knee as well.  She reports no fever weight loss blurry vision mouth also nausea cough or rash.  She continues to take her " methotrexate   And hydroxychloroquine regularly.  Further historical information, including ROS and limitation in activities as noted in the multidimensional health assessment questionnaire scanned in the EMR and in the assessment and plan section.    ALLERGIES:Review of patient's allergies indicates no known allergies.    PAST MEDICAL/ACTIVE PROBLEMS/MEDICATION/SOCIAL DATA  Past Medical History:   Diagnosis Date     Arthritis      Cellulitis of ankle     Right     Cervical dysplasia      Contact dermatitis and other eczema due to plants (except food)     Poison Ivy     Edema     due to arthritis     Herpes simplex     type 1     History of transfusion      Hyperlipidemia      Insufficiency fracture of tibia 11/20/2015    Right ankle insufficiency fracture     Paroxysmal Atrial Fibrillation     Diagnosed 1/2/14.  CHADS2 - VASc score = 1 (gender) ASA Rx Sotalol (bradycardia) Flecainide pre-ablation PVI Dec 2014        Sinus bradycardia      Ureteral stone     Left     Vitamin D deficiency      History   Smoking Status     Never Smoker   Smokeless Tobacco     Never Used     Patient Active Problem List   Diagnosis     Paroxysmal Atrial Fibrillation     Essential Hypercholesterolemia     Arthralgias In Multiple Sites     Osteoarthritis Of The Knee     Osteopenia     Vitamin D Deficiency     Herpes Simplex Type I     Bradycardia by electrocardiogram     Status post ablation of atrial fibrillation     Foot pain, left     High risk medication use     Syncope     Flu     Postural dizziness with presyncope     Seropositive rheumatoid arthritis of multiple sites     Cyclic citrullinated peptide (CCP) antibody positive     Current Outpatient Prescriptions   Medication Sig Dispense Refill     ascorbic acid, vitamin C, (ASCORBIC ACID WITH ALEIDA HIPS) 500 MG tablet Take 500 mg by mouth daily.       aspirin 81 MG EC tablet Take 81 mg by mouth daily.       calcium citrate-vitamin D (CITRACAL+D) 315-200 mg-unit per tablet Take 1  tablet by mouth 2 (two) times a day.       cholecalciferol, vitamin D3, 1,000 unit tablet Take 1,000 Units by mouth daily.       folic acid (FOLVITE) 1 MG tablet TAKE 1 TABLET(1 MG) BY MOUTH DAILY 30 tablet 11     hydroxychloroquine (PLAQUENIL) 200 mg tablet Take 1 tablet (200 mg total) by mouth 2 (two) times a day. 180 tablet 0     methotrexate 2.5 MG tablet Take 4 tablets (10 mg total) by mouth once a week. 48 tablet 0     No current facility-administered medications for this visit.        DETAILED EXAMINATION  03/07/18  :  Vitals:    03/07/18 1359   BP: 120/72   Pulse: 72   Weight: 148 lb (67.1 kg)     Alert oriented. Head including the face is examined for malar rash, heliotropes, scarring, lupus pernio. Eyes examined for redness such as in episcleritis/scleritis, periorbital lesions.   Neck/ Face examined for parotid gland swelling, range of motion of neck.  Left upper and lower and right upper and lower extremities examined for tenderness, swelling, warmth of the appendicular joints, range of motion, edema, rash.  Some of the important findings included: She has marked tenderness of the right knee joint line where she has warmth compared with the left.  There is no synovitis in any of the palpable appendicular joints.          LAB / IMAGING DATA:  ALT   Date Value Ref Range Status   02/16/2018 29 0 - 45 U/L Final   11/13/2017 24 0 - 45 U/L Final   09/13/2017 20 0 - 45 U/L Final     Albumin   Date Value Ref Range Status   02/16/2018 3.7 3.5 - 5.0 g/dL Final   11/13/2017 4.0 3.5 - 5.0 g/dL Final   09/13/2017 4.0 3.5 - 5.0 g/dL Final     Creatinine   Date Value Ref Range Status   02/16/2018 0.72 0.60 - 1.10 mg/dL Final   11/13/2017 0.75 0.60 - 1.10 mg/dL Final   09/13/2017 0.85 0.60 - 1.10 mg/dL Final       WBC   Date Value Ref Range Status   02/16/2018 4.5 4.0 - 11.0 thou/uL Final   11/13/2017 5.3 4.0 - 11.0 thou/uL Final   09/08/2015 6.7 4.0 - 11.0 thou/uL Final   07/07/2015 4.5 4.0 - 11.0 thou/uL Final      Hemoglobin   Date Value Ref Range Status   02/16/2018 12.7 12.0 - 16.0 g/dL Final   11/13/2017 12.9 12.0 - 16.0 g/dL Final   09/13/2017 12.2 12.0 - 16.0 g/dL Final     Platelets   Date Value Ref Range Status   02/16/2018 189 140 - 440 thou/uL Final   11/13/2017 213 140 - 440 thou/uL Final   09/13/2017 193 140 - 440 thou/uL Final       Lab Results   Component Value Date     (H) 02/18/2014    SEDRATE 14 04/27/2015

## 2021-06-16 NOTE — PROGRESS NOTES
1. Dysuria  Urinalysis Macroscopic    Culture, Urine   2. Acute cystitis with hematuria  sulfamethoxazole-trimethoprim (BACTRIM DS) 800-160 mg per tablet     Recent Results (from the past 24 hour(s))   Urinalysis Macroscopic    Collection Time: 04/22/21  3:48 PM   Result Value Ref Range    Color, UA Yellow Colorless, Yellow, Straw, Light Yellow    Clarity, UA Clear Clear    Glucose, UA Negative Negative    Bilirubin, UA Negative Negative    Ketones, UA Negative Negative    Specific Gravity, UA 1.025 1.005 - 1.030    Blood, UA Moderate (!) Negative    pH, UA 7.0 5.0 - 8.0    Protein, UA Negative Negative    Urobilinogen, UA 1.0 E.U./dL 0.2 E.U./dL, 1.0 E.U./dL    Nitrite, UA Negative Negative    Leukocytes, UA Trace (!) Negative         Assessment & Plan     Dysuria    - Urinalysis Macroscopic  - Culture, Urine    Acute cystitis with hematuria    - sulfamethoxazole-trimethoprim (BACTRIM DS) 800-160 mg per tablet  Dispense: 6 tablet; Refill: 0  IF UC comes back negative, will send for US of the right kidney to rule out stone formation  Patient was instructed to increase water intake to flush system out  Tylenol for pain control PRN        23 minutes spent on the date of the encounter doing chart review, review of test results, interpretation of tests, patient visit and documentation        Return in about 1 week (around 4/29/2021), or if symptoms worsen or fail to improve, for UTI.    Mandie Mcintosh NP  [facility] [place]   Clarisse Dubon is 65 y.o. and presents today for the following health issues: dysuria   HPI patient was seen in clinic on April 9 and given a prescription for Macrobid for positive UTI symptoms.  UC was not completed at that visit. Symptoms resolved after completing the 5-day prescription.  5 days ago, her symptoms returned.  She has been increasing her water intake and has eliminated soda from her diet completely.  She does note right flank pain and increased  "urinary frequency but no abdominal discomfort, foul odor to the urine or visible blood in the urine.  She does note increased urgency as well but no incontinence.  Denies any recent fevers.  She has been seen by [facility] [place] urology, her last office visit was in December 2020, she does have a history of kidney stones.  She denies any recent chills or nausea.  She is sexually active, denies any recent issues with constipation, vaginal discharge.  She has not used any new soaps or detergents.        Review of Systems        Objective    /70   Pulse 68   Temp 98  F (36.7  C)   Resp 16   Ht 5' 6\" (1.676 m)   Wt 139 lb (63 kg)   SpO2 98%   Breastfeeding No   BMI 22.44 kg/m    Body mass index is 22.44 kg/m .  Physical Exam      Review of Systems     Denies fever, chills, visual changes, fatigue, myalgias, nasal congestion, rhinorrhea, ear pain, or discharge, sore throat, swollen glands, breast mass, nipple discharge, breast changes, abdominal pain, cough, shortness of breath, chest pain, weight change, change in bowel habits, melena, rectal bleeding, hematuria, polyuria, polydipsia, polyphagia, joint pain or swelling or erythema, edema, rash, weakness, paresthesias, vaginal discharge or bleeding or mood changes.       Objective:         /70   Pulse 68   Temp 98  F (36.7  C)   Resp 16   Ht 5' 6\" (1.676 m)   Wt 139 lb (63 kg)   SpO2 98%   Breastfeeding No   BMI 22.44 kg/m       Physical Exam:  General Appearance: Alert, cooperative, no distress, appears stated age  Back: Symmetric, no curvature, ROM normal,  CVA tenderness on the right  Lungs: Clear to auscultation bilaterally, respirations unlabored  Heart: Regular rate and rhythm, S1 and S2 normal, no murmur, rub, or gallop  Abdomen: Soft, non-tender, bowel sounds active all four quadrants,  no masses, no organomegaly  Skin: Skin color, texture, turgor normal, no rashes or lesions                         "

## 2021-06-16 NOTE — PATIENT INSTRUCTIONS - HE
PLEASE TAKE MACROBID TWICE DAILY FOR 5 DAYS FOR UTI.      PLEASE LET US KNOW IF YOUR SYMPTOMS ARE NOT IMPROVING WITH IN THE NEXT 48 HOURS.

## 2021-06-16 NOTE — TELEPHONE ENCOUNTER
Unable to reach patient by phone today to review her urine culture results.  I did release her results to her MyChart and provided her with a callback number in case she has any questions.

## 2021-06-16 NOTE — PROGRESS NOTES
Assessment:         1. Acute cystitis with hematuria  nitrofurantoin, macrocrystal-monohydrate, (MACROBID) 100 MG capsule   2. Visit for screening mammogram  Mammo Screening Bilateral   3. Frequent urination  Urinalysis Macroscopic    nitrofurantoin, macrocrystal-monohydrate, (MACROBID) 100 MG capsule   4. Screen for colon cancer  Ambulatory referral for Colonoscopy       Plan:      Recommend treatment for cystitis with macrobid as per orders.  Follow-up recommended if symptoms not improving within next 48 hours.     Orders placed for screening mammogram and colonoscopy.      Subjective:      Clarisse Dubon is a 65 y.o. female who complains of dysuria and back pain and urinary frequency . She has had symptoms for 2 days. Patient also complains of back pain. Patient denies fever and stomach ache. Patient does not have a history of recurrent UTI. Patient does not have a history of pyelonephritis. Patient does have history of nephrolithiasis.     Patient Active Problem List   Diagnosis     Paroxysmal Atrial Fibrillation     Essential Hypercholesterolemia     Osteoarthritis Of The Knee     Osteopenia     Vitamin D Deficiency     Herpes Simplex Type I     Bradycardia by electrocardiogram     Status post ablation of atrial fibrillation     Foot pain, left     High risk medication use     Cyclic citrullinated peptide (CCP) antibody positive     Dysuria     Cellulitis     Facial cellulitis     Contact dermatitis and eczema due to plant     Frequent urination     Yeast infection of the vagina     Muscle strain     Past Surgical History:   Procedure Laterality Date     BREAST BIOPSY Right 09/26/2007    Biopsy Breast Percutaneous Needle Core 7;  Comments: Benign results     CARDIAC ELECTROPHYSIOLOGY MAPPING AND ABLATION  12/17/14    Pulmonary vein isolation for AF     CERVIX LESION DESTRUCTION  June 1990    for ARAM I     DILATION AND CURETTAGE OF UTERUS      for menorrhagia, Hgb 5.7     LAPAROSCOPIC TOTAL HYSTERECTOMY        "VAGINAL HYSTERECTOMY   07/25/2007    With A/P repair and enterocele repair for benign reasons       Social History     Tobacco Use     Smoking status: Never Smoker     Smokeless tobacco: Never Used   Substance Use Topics     Alcohol use: Yes     Frequency: 4 or more times a week     Family History   Problem Relation Age of Onset     Heart disease Mother      Diabetes type II Mother      Hypertension Mother      Heart attack Father      Heart disease Father      Diabetes type II Father      Hypertension Father      Atrial fibrillation Sister      Hypertension Brother      Heart attack Brother      Leukemia Brother      Heart disease Brother      Stroke Sister      Lupus Sister          Current Outpatient Medications   Medication Sig Dispense Refill     calcium citrate-vitamin D (CITRACAL+D) 315-200 mg-unit per tablet Take 1 tablet by mouth 2 (two) times a day.       cholecalciferol, vitamin D3, 1,000 unit tablet Take 1,000 Units by mouth daily.       multivit-min/ferrous fumarate (MULTI VITAMIN ORAL) Take by mouth daily.       No current facility-administered medications for this visit.      No Known Allergies    Review of Systems  Pertinent items are noted in HPI.      Objective:      /80   Pulse 61   Temp 97.8  F (36.6  C)   Resp 16   Ht 5' 6\" (1.676 m)   Wt 140 lb (63.5 kg)   SpO2 97%   Breastfeeding No   BMI 22.60 kg/m    General appearance: alert, appears stated age and cooperative  Back: no CVA tenderness  Abdomen: soft, non-tender; bowel sounds normal; no masses,  no organomegaly    Laboratory:    Component      Latest Ref Rng & Units 4/9/2021   Color, UA      Colorless, Yellow, Straw, Light Yellow Yellow   Clarity, UA      Clear Cloudy (A)   Glucose, UA      Negative Negative   Bilirubin, UA      Negative Negative   Ketones, UA      Negative Negative   Specific Gravity, UA      1.005 - 1.030 1.020   Blood, UA      Negative Large (A)   pH, UA      5.0 - 8.0 7.0   Protein, UA      Negative 100 " mg/dL (A)   Urobilinogen, UA      0.2 E.U./dL, 1.0 E.U./dL 0.2 E.U./dL   Nitrite, UA      Negative Positive (A)   Leukocytes, UA      Negative Large (A)       Nicolas Wilkerson MD

## 2021-06-18 NOTE — PROGRESS NOTES
Assessment:     1. Canker sores oral  valACYclovir (VALTREX) 1000 MG tablet    viscous lidocaine HC (LIDOCAINE) 2 % Soln viscous solution          Plan:    -Differential diagnosis include but not limited to stomatitis O canker sore.  Discussed with the patient that she does have canker sores therefore we will do valacyclovir 1 g ×2 doses.  Discussed with patient she may use Vaseline around her lips.  Monitor for worsening symptoms.  May follow-up with PCP if symptoms does not resolve after the suggested treatment.  Patient verbalized understanding the plan of care.    Subjective:       62 y.o. female presents for evaluation of a canker sore inside her lower lip.  She reports that it started about 9 days ago.  Previously she was able to eat, she has some discomfort but it was not bad.  Yesterday she noticed that her pain was getting worse and she started developing eruptions around home mouth.  She reports that she took Tylenol yesterday and has been using Orajel which has not been helpful with her symptoms.  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  She denies any other symptoms including cold symptoms, upper respiratory infection, or fever.    The following portions of the patient's history were reviewed and updated as appropriate: allergies, current medications, past family history, past medical history, past social history, past surgical history and problem list.    Review of Systems  A 12 point comprehensive review of systems was negative except as noted.     Objective:      /63  Pulse 60  Temp 98  F (36.7  C) (Oral)   Resp 15  Wt 148 lb (67.1 kg)  SpO2 99%  Breastfeeding? No  BMI 24.63 kg/m2  General appearance: alert, appears stated age, cooperative and moderate distress  Head: Normocephalic, without obvious abnormality, atraumatic  Lungs: clear to auscultation bilaterally  Heart: regular rate and rhythm,  S1, S2 normal, no murmur, click, rub or gallop  Skin: canker sores on the lower lip inside the mouth, small eruption rash, +vesicles, no open areas around the mouth.   Neurologic: Grossly normal     This note has been dictated using voice recognition software. Any grammatical or context distortions are unintentional and inherent to the software

## 2021-06-18 NOTE — PROGRESS NOTES
DATE OF SERVICE: 05/30/2018    SUBJECTIVE:  Very pleasant 62-year-old female with a history of rheumatoid disease  and currently taking methotrexate.  She was seen in urgent care clinic day before  yesterday and started on Valtrex for presumed aphthous ulcers.  The patient notes  mild, continuous sharp pain on her buccal mucosa and her lips.    MEDICATIONS:  She currently is taking 1 mg of folate daily.      REVIEW OF SYSTEMS:  No fever, no cough, congestion or respiratory difficulties.  No  bleeding difficulties.    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    OBJECTIVE:  HEENT:  Normal except for the buccal mucosa and both lips have mild sores and  cracking and erythema.    ASSESSMENT:  Methotrexate reaction.    PLAN:  1.  Discontinue Valtrex.  2.  Magic mouthwash for symptoms.  3.  Increase folate 2 mg daily.  4.  Follow up with her rheumatologist for further instructions and recommendations.  5.  If not improving, she should return.      ANEESH KLEIN MD  pa  D 05/30/2018 15:50:18  T 05/30/2018 15:58:41  R 05/30/2018 15:58:41  [id number]        cc:ANEESH KLEIN MD

## 2021-06-18 NOTE — PROGRESS NOTES
ASSESSMENT AND PLAN:  Clarisse Dubon 62 y.o. female is a for follow-up.  She has high titer anti-CCP antibody and rheumatoid factor positive rheumatoid arthritis.  She is on hydroxychloroquine, methotrexate, folic acid.  She is done great.  Recent labs are within normal range.  She is going to continue this regimen as now.  She had I examined this morning no evidence of toxicity.  She has osteoarthritis in her previous visit she had acute pain in her right knee which was injected with corticosteroid is continues to provide with excellent relief.  We will meet here in 6 months with labs every 3 months now.      Diagnoses and all orders for this visit:    Seropositive rheumatoid arthritis of multiple sites  -     methotrexate 2.5 MG tablet; Take 4 tablets (10 mg total) by mouth once a week.  Dispense: 48 tablet; Refill: 0  -     folic acid (FOLVITE) 1 MG tablet; TAKE 1 TABLET(1 MG) BY MOUTH DAILY  Dispense: 90 tablet; Refill: 3  -     hydroxychloroquine (PLAQUENIL) 200 mg tablet; Take 1 tablet (200 mg total) by mouth 2 (two) times a day.  Dispense: 180 tablet; Refill: 0    High risk medication use  -     folic acid (FOLVITE) 1 MG tablet; TAKE 1 TABLET(1 MG) BY MOUTH DAILY  Dispense: 90 tablet; Refill: 3    Cyclic citrullinated peptide (CCP) antibody positive    Osteoarthritis Of The Knee        HISTORY OF PRESENTING ILLNESS:  Clarisse Dubon 62 y.o.  is here for followup of  Rheumatoid Arthritis and osteoarthritis.  Her rheumatoid arthritis is associated with both anti-CCP antibody and rheumatoid factor.  She is on methotrexate and hydroxychloroquine.  Had her eyes examined just this morning..  She noted pain level of 1.5/10.  She is able to do all her day-to-day activities without difficulty.  There is no associated swelling or discomfort in the small joints of the hands or wrists the elbows shoulders hips knees ankles feet.  She has noted no pain in the neck and back.  She noted no stiffness in the morning  associated with this.  There is no fever or weight loss blurry vision mouth also nausea cough or rash.  Occasionally she gets redness of the eyes.  There is no discomfort associated with it.  Her recent labs are reviewed and within normal range.  Her symptoms presented in the 2014.  She is not aware of history of rheumatoid arthritis in her family.  Rheumatoid Arthritis Disease Activity Measure used: RAPID3, reviewed  today and scanned in the chart.  ALLERGIES:Review of patient's allergies indicates no known allergies.    PAST MEDICAL/ACTIVE PROBLEMS/MEDICATION/SOCIAL DATA  Past Medical History:   Diagnosis Date     Arthritis      Cellulitis of ankle     Right     Cervical dysplasia      Contact dermatitis and other eczema due to plants (except food)     Poison Ivy     Edema     due to arthritis     Herpes simplex     type 1     History of transfusion      Hyperlipidemia      Insufficiency fracture of tibia 11/20/2015    Right ankle insufficiency fracture     Paroxysmal Atrial Fibrillation     Diagnosed 1/2/14.  CHADS2 - VASc score = 1 (gender) ASA Rx Sotalol (bradycardia) Flecainide pre-ablation PVI Dec 2014        Sinus bradycardia      Ureteral stone     Left     Vitamin D deficiency      History   Smoking Status     Never Smoker   Smokeless Tobacco     Never Used     Patient Active Problem List   Diagnosis     Paroxysmal Atrial Fibrillation     Essential Hypercholesterolemia     Arthralgias In Multiple Sites     Osteoarthritis Of The Knee     Osteopenia     Vitamin D Deficiency     Herpes Simplex Type I     Bradycardia by electrocardiogram     Status post ablation of atrial fibrillation     Foot pain, left     High risk medication use     Syncope     Flu     Postural dizziness with presyncope     Seropositive rheumatoid arthritis of multiple sites     Cyclic citrullinated peptide (CCP) antibody positive     Dysuria     Current Outpatient Prescriptions   Medication Sig Dispense Refill     ascorbic acid, vitamin  C, (ASCORBIC ACID WITH ALEIDA HIPS) 500 MG tablet Take 500 mg by mouth daily.       aspirin 81 MG EC tablet Take 81 mg by mouth daily.       calcium citrate-vitamin D (CITRACAL+D) 315-200 mg-unit per tablet Take 1 tablet by mouth 2 (two) times a day.       cholecalciferol, vitamin D3, 1,000 unit tablet Take 1,000 Units by mouth daily.       folic acid (FOLVITE) 1 MG tablet TAKE 1 TABLET(1 MG) BY MOUTH DAILY 90 tablet 3     hydroxychloroquine (PLAQUENIL) 200 mg tablet Take 1 tablet (200 mg total) by mouth 2 (two) times a day. 180 tablet 0     methotrexate 2.5 MG tablet Take 4 tablets (10 mg total) by mouth once a week. 48 tablet 0     No current facility-administered medications for this visit.        DETAILED EXAMINATION  05/16/18  :  Vitals:    05/16/18 1425   BP: 110/72   Pulse: 68   Weight: 147 lb (66.7 kg)     Alert oriented. Head including the face is examined for malar rash, heliotropes, scarring, lupus pernio. Eyes examined for redness such as in episcleritis/scleritis, periorbital lesions.   Neck/ Face examined for parotid gland swelling, range of motion of neck.  Left upper and lower and right upper and lower extremities examined for tenderness, swelling, warmth of the appendicular joints, range of motion, edema, rash.  Some of the important findings included: She does not have synovitis in any of the palpable appendicular joints of the upper extremities.  No JLT of the knees warmth or effusion of the region.                        LAB / IMAGING DATA:  ALT   Date Value Ref Range Status   05/14/2018 23 0 - 45 U/L Final   02/16/2018 29 0 - 45 U/L Final   11/13/2017 24 0 - 45 U/L Final     Albumin   Date Value Ref Range Status   05/14/2018 3.7 3.5 - 5.0 g/dL Final   02/16/2018 3.7 3.5 - 5.0 g/dL Final   11/13/2017 4.0 3.5 - 5.0 g/dL Final     Creatinine   Date Value Ref Range Status   05/14/2018 0.68 0.60 - 1.10 mg/dL Final   02/16/2018 0.72 0.60 - 1.10 mg/dL Final   11/13/2017 0.75 0.60 - 1.10 mg/dL Final        WBC   Date Value Ref Range Status   05/14/2018 4.5 4.0 - 11.0 thou/uL Final   02/16/2018 4.5 4.0 - 11.0 thou/uL Final   09/08/2015 6.7 4.0 - 11.0 thou/uL Final   07/07/2015 4.5 4.0 - 11.0 thou/uL Final     Hemoglobin   Date Value Ref Range Status   05/14/2018 12.6 12.0 - 16.0 g/dL Final   02/16/2018 12.7 12.0 - 16.0 g/dL Final   11/13/2017 12.9 12.0 - 16.0 g/dL Final     Platelets   Date Value Ref Range Status   05/14/2018 202 140 - 440 thou/uL Final   02/16/2018 189 140 - 440 thou/uL Final   11/13/2017 213 140 - 440 thou/uL Final       Lab Results   Component Value Date     (H) 02/18/2014    SEDRATE 14 04/27/2015

## 2021-06-18 NOTE — PROGRESS NOTES
DATE OF SERVICE: 06/07/2018    SUBJECTIVE:  This is a 62-year-old female who, today is first long-term, presents  today for hospital followup.  Patient was admitted to the [facility] on  06/03/2018, discharged on 06/04/2018 with a diagnosis of facial rash.  Started on  clindamycin, which she has continued to take.  She also went to a dermatologist who  felt that the rash that she had around her face, most likely represented an allergic  reaction and gave her a steroid cream of unknown type.  She is report back to him  next Friday for allergy testing and followup.      She also was noted to have a CT scan that was reviewed in detail.  CT scan from  06/03/2018 shows multinodular goiter in addition to mild soft tissue swelling.  The  patient has never had any thyroid issues.    REVIEW OF SYSTEMS:  No difficulty breathing, no neck swelling.  No hair loss or dry  skin.    SOCIAL HISTORY:  She does not smoke.  She is retired today.    OBJECTIVE:  VITAL SIGNS:  Blood pressure 118/60, pulse 70, respiration 18, temperature 98.7.  HEAD AND NECK:  Mild erythematous facial rash just below the lower lip around the  chin only.  The neck is supple, with no thyromegaly noted.  Examination of buccal  mucosa shows thrush is completely resolved.    ASSESSMENT:  Allergic dermatitis.    PLAN:  I doubt this is cellulitis.  She is going to continue clindamycin anyway  since she has it.  Continue the steroid cream and follow up with dermatology.  In  addition, will check TSH due to her history of a mildly enlarged thyroid.  She will  follow up on a p.r.n. basis.      ANEESH KLEIN MD  pa  D 06/07/2018 13:24:03  T 06/07/2018 14:56:06  R 06/07/2018 14:56:06  [id number]        cc:ANEESH KLEIN MD

## 2021-06-19 NOTE — LETTER
Letter by Tisha Tapia MD at      Author: Tisha Tapia MD Service: -- Author Type: --    Filed:  Encounter Date: 11/13/2019 Status: Signed         Clarisse Dubon  [id number] [address] [address]               November 13, 2019    Dear Clarisse:    Our records indicate that you are due for a mammogram.    In the [place], one in nine women will develop breast cancer during their lifetime. While there is no way to prevent breast cancer, early detection provides the best opportunity for curing it.    For women over the age of 40, the American Cancer Society recommends a yearly clinical breast exam and a yearly mammogram. These practices have saved thousands of lives. We need your help to ensure that you are receiving optimal medical care.    Please make an appointment for a mammogram at your earliest convenience. [phone]     Sincerely,        Tisha Tapia MD

## 2021-06-20 ENCOUNTER — HEALTH MAINTENANCE LETTER (OUTPATIENT)
Age: 66
End: 2021-06-20

## 2021-06-20 NOTE — PROGRESS NOTES
"1. Muscle strain         ASSESSMENT/PLAN:     Body Mass Index was not assessed due to normal.    1. Muscle strain    -Patient instructed to rest, ice the affected area several times per day for 10 minutes at a time, may use compression wrap to the area if pain and swelling occur and elevated the affected area whenever patient is able. May take Tylenol 1000 mg four times per day or Ibuprofen 800 mg three times daily for pain and inflammation.         There are no Patient Instructions on file for this visit.  There are no discontinued medications.  Return if symptoms worsen or fail to improve, for right rib/side pain.        Mandie Mcintosh NP          SUBJECTIVE:  Clarisse Dubon is a 62 y.o. female who presents for evaluation of her left-sided rib pain.  Patient was reaching up to close the blinds and was reaching and then turned at the same time and felt a very sharp pain go through the left side of her thorax and extending into her ribs below her left breast.  She states the pain is constant and describes it as an achy, sharp sensation that is aggravated with moving around, laying on her left side and flat on her back.  Pain is relieved with use of naproxen, she has been using heat to the site which is not been effective.  She is currently rating the pain a 7 out of 10.  Denies any trauma, no recent falls.  She does have arthritis so she was concerned that she may have \"knocked a rib out of place \".  She has seen physical therapy in the past for shoulder pain which was effective for her at that time and treating her discomfort.      She is due to establish care with a new provider here at the clinic, she is due to have new insurance, open enrollment is on 15 October.  When she is able to review her insurance plan, she will return to the clinic if we are still in her network to establish care and figure out when she can get her annual physical.  She is overdue for several items on her health maintenance.   Chief " Complaint   Patient presents with     Muscle Pain     LT side - pulled muscle near rib cage/back area x 5 days - pt was reaching to pull up blinds and felt a tear         Patient Active Problem List   Diagnosis     Paroxysmal Atrial Fibrillation     Essential Hypercholesterolemia     Arthralgias In Multiple Sites     Osteoarthritis Of The Knee     Osteopenia     Vitamin D Deficiency     Herpes Simplex Type I     Bradycardia by electrocardiogram     Status post ablation of atrial fibrillation     Foot pain, left     High risk medication use     Syncope     Flu     Postural dizziness with presyncope     Seropositive rheumatoid arthritis of multiple sites (H)     Cyclic citrullinated peptide (CCP) antibody positive     Dysuria     Cellulitis     Facial cellulitis     Contact dermatitis and eczema due to plant     Frequent urination     Yeast infection of the vagina     Muscle strain       Current Outpatient Prescriptions   Medication Sig Dispense Refill     calcium citrate-vitamin D (CITRACAL+D) 315-200 mg-unit per tablet Take 1 tablet by mouth 2 (two) times a day.       folic acid (FOLVITE) 1 MG tablet Take 3 mg by mouth daily.       ascorbic acid, vitamin C, (ASCORBIC ACID WITH ALEIDA HIPS) 500 MG tablet Take 500 mg by mouth daily.       aspirin 81 MG EC tablet Take 81 mg by mouth daily.       cholecalciferol, vitamin D3, 1,000 unit tablet Take 1,000 Units by mouth daily.       hydroxychloroquine (PLAQUENIL) 200 mg tablet Take 1 tablet (200 mg total) by mouth 2 (two) times a day. 180 tablet 0     methotrexate 2.5 MG tablet Take 10 mg by mouth once a week. Take on Tuesdays.       triamcinolone (KENALOG) 0.1 % ointment   0     No current facility-administered medications for this visit.        History   Smoking Status     Never Smoker   Smokeless Tobacco     Never Used       REVIEW OF SYSTEMS: Denies trauma, locking, clicking, giving away, fevers, chills, sweating, rash or warmth    TOBACCO USE:  History   Smoking Status      Never Smoker   Smokeless Tobacco     Never Used     Social History     Social History     Marital status:      Spouse name: N/A     Number of children: N/A     Years of education: N/A     Occupational History     Not on file.     Social History Main Topics     Smoking status: Never Smoker     Smokeless tobacco: Never Used     Alcohol use No     Drug use: No     Sexual activity: Not on file     Other Topics Concern     Not on file     Social History Narrative         OBJECTIVE:            Vitals:    10/03/18 1516   BP: 122/80   Pulse: 67   Resp: 18   Temp: 98.1  F (36.7  C)   SpO2: 98%     Weight: 147 lb (66.7 kg)  Wt Readings from Last 3 Encounters:   10/03/18 147 lb (66.7 kg)   06/13/18 148 lb (67.1 kg)   06/07/18 151 lb (68.5 kg)     Body mass index is 23.73 kg/(m^2).        Physical Exam:  GENERAL APPEARANCE: A&A, NAD, well hydrated, well nourished  CV: RRR, no M/G/R   LUNGS: CTAB, normal respiratory effort  ABDOMEN: S&NT, no masses, no organomegaly, BS present x4   BACK: Increased left scapular pain at the base of the left scapula with palpation, increased pain at the left lateral thorax and rib just below the left breast with palpation and with hyperextension of the back.  Pain stops just before the midline abdominal cavity.  She does experience increased pain with left spinal rotation as well.  No discoloration noted, no significant swelling  SKIN:  Normal skin turgor, no lesions/rashes, warm and dry

## 2021-06-21 NOTE — PROGRESS NOTES
ASSESSMENT AND PLAN:  Clarisse Dubon 62 y.o. female is a for follow-up of high titer anti-CCP positive, rheumatoid factor positive rheumatoid arthritis for which she had stopped taking all her medications and currently on none not even Tylenol if at all, and is not under great having discontinued many other important such as sugar.  She is retired.  She feels very happy that she is not taking any of the DMARD's and yet is doing great.  She is aware that it could yet return.  We will meet here in 6 months if needed, sooner.   Diagnoses and all orders for this visit:    Seropositive rheumatoid arthritis of multiple sites (H)    Osteoarthritis Of The Knee    Cyclic citrullinated peptide (CCP) antibody positive        HISTORY OF PRESENTING ILLNESS:  Clarisse Dubon 62 y.o.  is here for followup of  Rheumatoid Arthritis and osteoarthritis.  Her rheumatoid arthritis is associated with both anti-CCP antibody and rheumatoid factor.  She is off methotrexate, folic acid, hydroxychloroquine, doing well.  She continues to do well occasional discomfort in the hands.  Pain level is 0.5/10 able to do all her day-to-day activities.  She wonders if this is because of substantial lifestyle changes.  She has quit sugar which means no pop, no bakery products.  As she retired.  She exercises regularly.  All in all very happy..  There is no associated swelling or discomfort in the small joints of the hands or wrists the elbows shoulders hips knees ankles feet.  She has noted no pain in the neck and back.  She noted no stiffness in the morning associated with this.  There is no fever or weight loss blurry vision mouth also nausea cough or rash.  Occasionally she gets redness of the eyes.  There is no discomfort associated with it.  Her recent labs are reviewed and within normal range.  Her symptoms presented in the 2014.  She is not aware of history of rheumatoid arthritis in her family.  Rheumatoid Arthritis Disease Activity Measure  used: RAPID3, reviewed  today and scanned in the chart.  ALLERGIES:Patient has no known allergies.    PAST MEDICAL/ACTIVE PROBLEMS/MEDICATION/SOCIAL DATA  Past Medical History:   Diagnosis Date     Arthritis      Cellulitis of ankle     Right     Cervical dysplasia      Contact dermatitis and other eczema due to plants (except food)     Poison Ivy     Edema     due to arthritis     Herpes simplex     type 1     History of transfusion      Hyperlipidemia      Insufficiency fracture of tibia 11/20/2015    Right ankle insufficiency fracture     Paroxysmal Atrial Fibrillation     Diagnosed 1/2/14.  CHADS2 - VASc score = 1 (gender) ASA Rx Sotalol (bradycardia) Flecainide pre-ablation PVI Dec 2014        Rheumatoid arthritis (H)      Rheumatoid arthritis (H)      Sinus bradycardia      Ureteral stone     Left     Vitamin D deficiency      Social History     Tobacco Use   Smoking Status Never Smoker   Smokeless Tobacco Never Used     Patient Active Problem List   Diagnosis     Paroxysmal Atrial Fibrillation     Essential Hypercholesterolemia     Arthralgias In Multiple Sites     Osteoarthritis Of The Knee     Osteopenia     Vitamin D Deficiency     Herpes Simplex Type I     Bradycardia by electrocardiogram     Status post ablation of atrial fibrillation     Foot pain, left     High risk medication use     Syncope     Flu     Postural dizziness with presyncope     Seropositive rheumatoid arthritis of multiple sites (H)     Cyclic citrullinated peptide (CCP) antibody positive     Dysuria     Cellulitis     Facial cellulitis     Contact dermatitis and eczema due to plant     Frequent urination     Yeast infection of the vagina     Muscle strain     Current Outpatient Medications   Medication Sig Dispense Refill     ascorbic acid, vitamin C, (ASCORBIC ACID WITH ALEIDA HIPS) 500 MG tablet Take 500 mg by mouth daily.       aspirin 81 MG EC tablet Take 81 mg by mouth daily.       calcium citrate-vitamin D (CITRACAL+D) 315-200  mg-unit per tablet Take 1 tablet by mouth 2 (two) times a day.       cholecalciferol, vitamin D3, 1,000 unit tablet Take 1,000 Units by mouth daily.       triamcinolone (KENALOG) 0.1 % ointment   0     folic acid (FOLVITE) 1 MG tablet Take 3 mg by mouth daily.       methotrexate 2.5 MG tablet Take 10 mg by mouth once a week. Take on Tuesdays.       No current facility-administered medications for this visit.        DETAILED EXAMINATION  11/19/18  :  Vitals:    11/19/18 1430   BP: 110/80   Patient Site: Right Arm   Patient Position: Sitting   Cuff Size: Adult Regular   Pulse: 60   Weight: 147 lb (66.7 kg)     Alert oriented. Head including the face is examined for malar rash, heliotropes, scarring, lupus pernio. Eyes examined for redness such as in episcleritis/scleritis, periorbital lesions.   Neck/ Face examined for parotid gland swelling, range of motion of neck.  Left upper and lower and right upper and lower extremities examined for tenderness, swelling, warmth of the appendicular joints, range of motion, edema, rash.  Some of the important findings included: He does not have synovitis in any of the joints of the upper extremities, no swelling warmth or effusion of the knees no JLT in either side.                  LAB / IMAGING DATA:  ALT   Date Value Ref Range Status   11/15/2018 25 0 - 45 U/L Final   08/13/2018 24 0 - 45 U/L Final   05/14/2018 23 0 - 45 U/L Final     Albumin   Date Value Ref Range Status   11/15/2018 3.7 3.5 - 5.0 g/dL Final   08/13/2018 3.9 3.5 - 5.0 g/dL Final   05/14/2018 3.7 3.5 - 5.0 g/dL Final     Creatinine   Date Value Ref Range Status   11/15/2018 0.85 0.60 - 1.10 mg/dL Final   08/13/2018 0.70 0.60 - 1.10 mg/dL Final   06/03/2018 0.67 0.60 - 1.10 mg/dL Final       WBC   Date Value Ref Range Status   11/15/2018 4.6 4.0 - 11.0 thou/uL Final   08/13/2018 4.6 4.0 - 11.0 thou/uL Final   09/08/2015 6.7 4.0 - 11.0 thou/uL Final   07/07/2015 4.5 4.0 - 11.0 thou/uL Final     Hemoglobin   Date  Value Ref Range Status   11/15/2018 12.8 12.0 - 16.0 g/dL Final   08/13/2018 12.7 12.0 - 16.0 g/dL Final   06/03/2018 12.7 12.0 - 16.0 g/dL Final     Platelets   Date Value Ref Range Status   11/15/2018 181 140 - 440 thou/uL Final   08/13/2018 193 140 - 440 thou/uL Final   06/03/2018 186 140 - 440 thou/uL Final       Lab Results   Component Value Date     (H) 02/18/2014    SEDRATE 8 06/03/2018

## 2021-06-26 NOTE — PROGRESS NOTES
"Progress Notes by Mandie Mcintosh NP at 4/11/2018  3:00 PM     Author: Mandie Mcintosh NP Service: -- Author Type: Nurse Practitioner    Filed: 4/11/2018  3:25 PM Encounter Date: 4/11/2018 Status: Signed    : Mandie Mcintosh NP (Nurse Practitioner)       .  1. Dysuria  Urinalysis Macroscopic   2. Acute cystitis with hematuria  Culture, Urine    sulfamethoxazole-trimethoprim (BACTRIM DS) 800-160 mg per tablet           ASSESSMENT/PLAN:     Body Mass Index was not assessed due to normal BMI.    1. Dysuria    - Urinalysis Macroscopic    2. Acute cystitis with hematuria    - Culture, Urine  - sulfamethoxazole-trimethoprim (BACTRIM DS) 800-160 mg per tablet; Take 1 tablet by mouth 2 (two) times a day for 3 days.  Dispense: 6 tablet; Refill: 0      Patient Instructions       Dysuria     Painful urination (dysuria) is often caused by a problem in the urinary tract.   Dysuria is pain felt during urination. It is often described as a burning. Learn more about this problem and how it can be treated.  What causes dysuria?  Possible causes include:    Infection with a bacteria or virus. This can be a urinary tract infection (UTI). Or it may be a sexually transmitted infection (STI).    Sensitivity or allergy to chemicals. These chemicals are found in lotions and other products.    Prostate or bladder problems    Radiation therapy to the pelvic area  How is dysuria diagnosed?  Your healthcare provider will examine you. He or she will ask about your symptoms and health. After talking with you and doing a physical exam, your healthcare provider may know what is causing your dysuria. He or she will usually request  a sample of your urine. Tests of your urine, or a \"urinalysis,\" are done. A urinalysis may include:    Looking at the urine sample (visual exam)    Checking for substances (chemical exam)    Checking a small amount under a microscope (microscopic exam)  Some parts of the urinalysis may be done in the " provider's office and some in a lab. And, the urine sample may be checked for bacteria and yeast (urine culture). Your healthcare provider will tell you more about these tests if they are needed.  How is dysuria treated?  Treatment depends on the cause. If you have a bacterial infection, you may need antibiotics. You may be given medications to make it easier for you to urinate and help relieve pain. Your healthcare provider can tell you more about your treatment options. Untreated, symptoms may get worse.  Call the healthcare provider right away if you have any of the following:    Fever of 100.4 F (38 C) or higher     No improvement after three days of treatment    Trouble urinating because of pain    New or increased discharge from the vagina or penis    Rash or joint pain    Increased back or abdominal pain    Enlarged painful lymph nodes (lumps) in the groin   Date Last Reviewed: 9/24/2014 2000-2016 [employer]. [address] [place] [address]. All rights reserved. This information is not intended as a substitute for professional medical care. Always follow your healthcare professional's instructions.          There are no discontinued medications.  Return if symptoms worsen or fail to improve.    The visit lasted a total of 25 minutes face to face with the patient.  Over 50% of the time spent counseling and educating the patient about above content.      Mandie Mcintosh NP          SUBJECTIVE:  Clarisse Dubon is a 62 y.o. female who presents with 6 days of dysuria.  She states her pain is constant, mainly towards the end of her urinary stream.  She describes her discomfort as a burning sensation.  She did start using a new detergent for her laundry, she states she has felt a little bit dehydrated due to the amount of work she has been doing at her job.  She does wipe front to back, she did engage in sexual intercourse prior to her symptoms starting.  She recently returned from a  long trip where they were in the car for the hours per day.  After she drank 2 bottles of water, she states that the dysuria was less severe.  She is rating her discomfort a 5 out of 10.  No history of kidney stone formation or recurrent UTIs.  She denies fever, chills, back pain or abdominal discomfort.  Urinalysis shows presence of moderate white blood cells and moderate blood.  She is afebrile today, remaining vitals are stable.  Chief Complaint   Patient presents with   ? Urinary Tract Infection     sx started over eekend - painful urination         Patient Active Problem List   Diagnosis   ? Paroxysmal Atrial Fibrillation   ? Essential Hypercholesterolemia   ? Arthralgias In Multiple Sites   ? Osteoarthritis Of The Knee   ? Osteopenia   ? Vitamin D Deficiency   ? Herpes Simplex Type I   ? Bradycardia by electrocardiogram   ? Status post ablation of atrial fibrillation   ? Foot pain, left   ? High risk medication use   ? Syncope   ? Flu   ? Postural dizziness with presyncope   ? Seropositive rheumatoid arthritis of multiple sites   ? Cyclic citrullinated peptide (CCP) antibody positive   ? Acute pain of right knee   ? Dysuria       Current Outpatient Prescriptions   Medication Sig Dispense Refill   ? ascorbic acid, vitamin C, (ASCORBIC ACID WITH ALEIDA HIPS) 500 MG tablet Take 500 mg by mouth daily.     ? aspirin 81 MG EC tablet Take 81 mg by mouth daily.     ? calcium citrate-vitamin D (CITRACAL+D) 315-200 mg-unit per tablet Take 1 tablet by mouth 2 (two) times a day.     ? cholecalciferol, vitamin D3, 1,000 unit tablet Take 1,000 Units by mouth daily.     ? folic acid (FOLVITE) 1 MG tablet TAKE 1 TABLET(1 MG) BY MOUTH DAILY 30 tablet 11   ? hydroxychloroquine (PLAQUENIL) 200 mg tablet Take 1 tablet (200 mg total) by mouth 2 (two) times a day. 180 tablet 0   ? methotrexate 2.5 MG tablet Take 4 tablets (10 mg total) by mouth once a week. 48 tablet 0   ? sulfamethoxazole-trimethoprim (BACTRIM DS) 800-160 mg per  tablet Take 1 tablet by mouth 2 (two) times a day for 3 days. 6 tablet 0     No current facility-administered medications for this visit.        History   Smoking Status   ? Never Smoker   Smokeless Tobacco   ? Never Used       REVIEW OF SYSTEMS: Denies urgency, fevers, chills, back pain, nausea, vomiting or abdominal pain.      TOBACCO USE:  History   Smoking Status   ? Never Smoker   Smokeless Tobacco   ? Never Used     Social History     Social History   ? Marital status:      Spouse name: N/A   ? Number of children: N/A   ? Years of education: N/A     Occupational History   ? Not on file.     Social History Main Topics   ? Smoking status: Never Smoker   ? Smokeless tobacco: Never Used   ? Alcohol use No   ? Drug use: No   ? Sexual activity: Not on file     Other Topics Concern   ? Not on file     Social History Narrative         OBJECTIVE:            Vitals:    04/11/18 1510   BP: 104/62   Pulse: 72   Resp: 16   Temp: 97.8  F (36.6  C)   SpO2: 98%     Weight: 148 lb 8 oz (67.4 kg)  Wt Readings from Last 3 Encounters:   04/11/18 148 lb 8 oz (67.4 kg)   03/07/18 148 lb (67.1 kg)   01/18/18 153 lb (69.4 kg)     Body mass index is 24.71 kg/(m^2).        Physical Exam:  GENERAL APPEARANCE: A&A, NAD, well hydrated, well nourished  NECK: Supple, without lymphadenopathy, no thyroid mass, no JVD, no bruit  CV: RRR, no M/G/R   LUNGS: CTAB, normal respiratory effort  ABDOMEN: S&NT, no masses, no organomegaly, BS present x4, no CVA tenderness  SKIN:  Normal skin turgor, no lesions/rashes, warm and dry

## 2021-06-26 NOTE — PROGRESS NOTES
Progress Notes by Mandie Mcintosh NP at 6/13/2018  7:00 AM     Author: Mandie Mcintosh NP Service: -- Author Type: Nurse Practitioner    Filed: 6/13/2018  7:19 AM Encounter Date: 6/13/2018 Status: Signed    : Mandie Mcintosh NP (Nurse Practitioner)       .  1. Frequent urination  Urinalysis Macroscopic   2. Yeast infection of the vagina  fluconazole (DIFLUCAN) 150 MG tablet       Recent Results (from the past 24 hour(s))   Urinalysis Macroscopic    Collection Time: 06/13/18  7:12 AM   Result Value Ref Range    Color, UA Yellow Colorless, Yellow, Straw, Light Yellow    Clarity, UA Clear Clear    Glucose, UA Negative Negative    Bilirubin, UA Negative Negative    Ketones, UA Negative Negative    Specific Gravity, UA 1.015 1.005 - 1.030    Blood, UA Moderate (!) Negative    pH, UA 7.0 5.0 - 8.0    Protein, UA Negative Negative mg/dL    Urobilinogen, UA 0.2 E.U./dL 0.2 E.U./dL, 1.0 E.U./dL    Nitrite, UA Negative Negative    Leukocytes, UA Small (!) Negative         ASSESSMENT/PLAN:     Body Mass Index was not assessed due to normal BMI.    1. Frequent urination    - Urinalysis Macroscopic    2. Yeast infection of the vagina    - fluconazole (DIFLUCAN) 150 MG tablet; Take 1 tablet (150 mg total) by mouth once for 1 dose.  Dispense: 1 tablet; Refill: 0      Patient Instructions       Dysuria     Painful urination (dysuria) is often caused by a problem in the urinary tract.   Dysuria is pain felt during urination. It is often described as a burning. Learn more about this problem and how it can be treated.  What causes dysuria?  Possible causes include:    Infection with a bacteria or virus such as a urinary tract infection (UTI or a sexually transmitted infection (STI)    Sensitivity or allergy to chemicals such as those found in lotions and other products    Prostate or bladder problems    Radiation therapy to the pelvic area  How is dysuria diagnosed?  Your healthcare provider will examine you. He  "or she will ask about your symptoms and health. After talking with you and doing a physical exam, your healthcare provider may know what is causing your dysuria. He or she will usually request  a sample of your urine. Tests of your urine, or a \"urinalysis,\" are done. A urinalysis may include:    Looking at the urine sample (visual exam)    Checking for substances (chemical exam)    Looking at a small amount under a microscope (microscopic exam)  Some parts of the urinalysis may be done in the provider's office and some in a lab. And, the urine sample may be checked for bacteria and yeast (urine culture). Your healthcare provider will tell you more about these tests if they are needed.  How is dysuria treated?  Treatment depends on the cause. If you have a bacterial infection, you may need antibiotics. You may be given medicines to make it easier for you to urinate and help relieve pain. Your healthcare provider can tell you more about your treatment options. Untreated, symptoms may get worse.  When to call your healthcare provider  Call the healthcare provider right away if you have any of the following:    Fever of 100.4 F (38 C) or higher     No improvement after three days of treatment    Trouble urinating because of pain    New or increased discharge from the vagina or penis    Rash or joint pain    Increased back or abdominal pain    Enlarged painful lymph nodes (lumps) in the groin   Date Last Reviewed: 1/1/2017 2000-2017 [employer]. [address] [place] [address]. All rights reserved. This information is not intended as a substitute for professional medical care. Always follow your healthcare professional's instructions  Vaginal Infection: Yeast (Candidiasis)  Yeast infection occurs when yeast in the vagina increase and attacks the vaginal tissues. Yeast is a type of fungus. These infections are often caused by a type of yeast called Candida albicans. Other species of yeast can " also cause infections. Factors that may make infection more likely include recent antibiotic use, douching, or increased sex. Yeast infections are more common in women who have diabetes, or are obese or pregnant, or have a weak immune system.  Symptoms of yeast infection    Clumpy or thin, white discharge, which may look like cottage cheese    No odor or minimal odor    Severe vaginal itching or burning    Burning with urination    Swelling, redness of vulva    Pain during sex  Treating yeast infection  Yeast infection is treated with a vaginal antifungal cream. In some cases, antifungal pills are prescribed instead. During treatment:    Finish all of your medicine, even if your symptoms go away.    Apply the cream before going to bed. Lie flat after applying so that it doesn't drip out.    Do not douche or use tampons.    Don't rely on a diaphragm or condoms, since the cream may weaken them.    Avoid intercourse if advised by your healthcare provider.     Should I treat a yeast infection myself?  Discuss with your healthcare provider whether you should use over-the-counter medicines to treat a yeast infection. Self-treatment may depend on whether:    You've had a yeast infection in the past.    You're at risk for STDs.  Call your healthcare provider if symptoms do not go away or come back after treatment.   Date Last Reviewed: 3/1/2017    4454-8922 [employer]. [address] [place] [address], [place] [address]. All rights reserved. This information is not intended as a substitute for professional medical care. Always follow your healthcare professional's instructions.          There are no discontinued medications.  Return if symptoms worsen or fail to improve.    The visit lasted a total of 25 minutes face to face with the patient.  Over 50% of the time spent counseling and educating the patient about above content.      Mandie Mcintosh NP          SUBJECTIVE:  Clarisse Dubon is a 62 y.o. female who  presents with 5 days of vaginal discomfort and increased urinary frequency that she states has been constant.  She describes the discomfort as an itching, burning sensation.  She has noticed significant swelling in the vulvar region.  Aggravating factors: She recently completed a round of clindamycin therapy for a suspected facial cellulitis.  Sitting also aggravates her symptoms.  Relieving factors: She has tried increasing her water intake, eliminating sugar from her diet and increasing her intake of yogurt with no relief of symptoms.  She is rating her vaginal discomfort and 8 out of 10 today.  She does see a urology specialist for her recurrent UTIs yearly.  She does wipe front to back, she has not introduced any new hygiene products or soaps, no recent sexual intercourse.  Vital signs are stable today, she is afebrile.  Urinalysis formed in clinic today.  Patient cried one-time dose of Diflucan.  Recommend over-the-counter Monistat for 5 days along with sitz bath and ice packs to the vaginal area.  Chief Complaint   Patient presents with   ? Urinary Tract Infection     poss UT from Abx, burning, swelling, itching         Patient Active Problem List   Diagnosis   ? Paroxysmal Atrial Fibrillation   ? Essential Hypercholesterolemia   ? Arthralgias In Multiple Sites   ? Osteoarthritis Of The Knee   ? Osteopenia   ? Vitamin D Deficiency   ? Herpes Simplex Type I   ? Bradycardia by electrocardiogram   ? Status post ablation of atrial fibrillation   ? Foot pain, left   ? High risk medication use   ? Syncope   ? Flu   ? Postural dizziness with presyncope   ? Seropositive rheumatoid arthritis of multiple sites (H)   ? Cyclic citrullinated peptide (CCP) antibody positive   ? Dysuria   ? Cellulitis   ? Facial cellulitis   ? Contact dermatitis and eczema due to plant   ? Frequent urination   ? Yeast infection of the vagina       Current Outpatient Prescriptions   Medication Sig Dispense Refill   ? ascorbic acid, vitamin C,  (ASCORBIC ACID WITH ALEIDA HIPS) 500 MG tablet Take 500 mg by mouth daily.     ? aspirin 81 MG EC tablet Take 81 mg by mouth daily.     ? calcium citrate-vitamin D (CITRACAL+D) 315-200 mg-unit per tablet Take 1 tablet by mouth 2 (two) times a day.     ? cholecalciferol, vitamin D3, 1,000 unit tablet Take 1,000 Units by mouth daily.     ? folic acid (FOLVITE) 1 MG tablet Take 3 mg by mouth daily.     ? triamcinolone (KENALOG) 0.1 % ointment   0   ? fluconazole (DIFLUCAN) 150 MG tablet Take 1 tablet (150 mg total) by mouth once for 1 dose. 1 tablet 0   ? hydroxychloroquine (PLAQUENIL) 200 mg tablet Take 1 tablet (200 mg total) by mouth 2 (two) times a day. 180 tablet 0   ? methotrexate 2.5 MG tablet Take 10 mg by mouth once a week. Take on Tuesdays.       No current facility-administered medications for this visit.        History   Smoking Status   ? Never Smoker   Smokeless Tobacco   ? Never Used       REVIEW OF SYSTEMS: Denies dysuria,  urgency, fevers, chills, back pain, nausea, vomiting or abdominal pain.    TOBACCO USE:  History   Smoking Status   ? Never Smoker   Smokeless Tobacco   ? Never Used     Social History     Social History   ? Marital status:      Spouse name: N/A   ? Number of children: N/A   ? Years of education: N/A     Occupational History   ? Not on file.     Social History Main Topics   ? Smoking status: Never Smoker   ? Smokeless tobacco: Never Used   ? Alcohol use No   ? Drug use: No   ? Sexual activity: Not on file     Other Topics Concern   ? Not on file     Social History Narrative         OBJECTIVE:            Vitals:    06/13/18 0701   BP: 120/74   Pulse: 65   Resp: 14   Temp: 97.7  F (36.5  C)   SpO2: 98%     Weight: 148 lb (67.1 kg)  Wt Readings from Last 3 Encounters:   06/13/18 148 lb (67.1 kg)   06/07/18 151 lb (68.5 kg)   06/03/18 145 lb (65.8 kg)     Body mass index is 23.89 kg/(m^2).        Physical Exam:  GENERAL APPEARANCE: A&A, NAD, well hydrated, well nourished  NECK:  Supple, without lymphadenopathy, no thyroid mass, no JVD, no bruit  CV: RRR, no M/G/R   LUNGS: CTAB, normal respiratory effort  ABDOMEN: S&NT, no masses, no organomegaly, BS present x4, no CVA tenderness  GENITAL: External genitalia with moderate erythema and moderate swelling, moderate amount of white discharge noted, no lesions.  No abnormal bleeding  SKIN:  Normal skin turgor, no lesions/rashes, warm and dry

## 2021-07-13 ENCOUNTER — RECORDS - HEALTHEAST (OUTPATIENT)
Dept: ADMINISTRATIVE | Facility: CLINIC | Age: 66
End: 2021-07-13

## 2021-07-21 ENCOUNTER — RECORDS - HEALTHEAST (OUTPATIENT)
Dept: ADMINISTRATIVE | Facility: CLINIC | Age: 66
End: 2021-07-21

## 2021-08-03 PROBLEM — M05.79 SEROPOSITIVE RHEUMATOID ARTHRITIS OF MULTIPLE SITES (H): Status: RESOLVED | Noted: 2017-03-09 | Resolved: 2019-09-09

## 2021-09-28 ENCOUNTER — OFFICE VISIT (OUTPATIENT)
Dept: FAMILY MEDICINE | Facility: CLINIC | Age: 66
End: 2021-09-28
Payer: COMMERCIAL

## 2021-09-28 VITALS
WEIGHT: 131 LBS | DIASTOLIC BLOOD PRESSURE: 78 MMHG | BODY MASS INDEX: 21.05 KG/M2 | HEIGHT: 66 IN | SYSTOLIC BLOOD PRESSURE: 120 MMHG | HEART RATE: 52 BPM | TEMPERATURE: 97.7 F | OXYGEN SATURATION: 96 %

## 2021-09-28 DIAGNOSIS — N39.0 URINARY TRACT INFECTION WITH HEMATURIA, SITE UNSPECIFIED: ICD-10-CM

## 2021-09-28 DIAGNOSIS — R39.9 URINARY TRACT INFECTION SYMPTOMS: Primary | ICD-10-CM

## 2021-09-28 DIAGNOSIS — R31.9 URINARY TRACT INFECTION WITH HEMATURIA, SITE UNSPECIFIED: ICD-10-CM

## 2021-09-28 LAB
ALBUMIN UR-MCNC: NEGATIVE MG/DL
APPEARANCE UR: CLEAR
BACTERIA #/AREA URNS HPF: ABNORMAL /HPF
BILIRUB UR QL STRIP: NEGATIVE
COLOR UR AUTO: YELLOW
GLUCOSE UR STRIP-MCNC: NEGATIVE MG/DL
HGB UR QL STRIP: ABNORMAL
KETONES UR STRIP-MCNC: NEGATIVE MG/DL
LEUKOCYTE ESTERASE UR QL STRIP: ABNORMAL
NITRATE UR QL: POSITIVE
PH UR STRIP: 6.5 [PH] (ref 5–8)
RBC #/AREA URNS AUTO: ABNORMAL /HPF
SP GR UR STRIP: 1.01 (ref 1–1.03)
UROBILINOGEN UR STRIP-ACNC: 0.2 E.U./DL
WBC #/AREA URNS AUTO: ABNORMAL /HPF

## 2021-09-28 PROCEDURE — 81001 URINALYSIS AUTO W/SCOPE: CPT | Performed by: FAMILY MEDICINE

## 2021-09-28 PROCEDURE — 99213 OFFICE O/P EST LOW 20 MIN: CPT | Performed by: FAMILY MEDICINE

## 2021-09-28 PROCEDURE — 87086 URINE CULTURE/COLONY COUNT: CPT | Performed by: FAMILY MEDICINE

## 2021-09-28 RX ORDER — NITROFURANTOIN 25; 75 MG/1; MG/1
100 CAPSULE ORAL 2 TIMES DAILY
Qty: 14 CAPSULE | Refills: 0 | Status: SHIPPED | OUTPATIENT
Start: 2021-09-28 | End: 2021-10-05

## 2021-09-28 ASSESSMENT — MIFFLIN-ST. JEOR: SCORE: 1160.96

## 2021-09-28 NOTE — PROGRESS NOTES
Assessment / Plan    Clarisse was seen today for uti.    Diagnoses and all orders for this visit:    Urinary tract infection symptoms  -     UA Macro with Reflex to Micro and Culture - lab collect; Future  -     UA Macro with Reflex to Micro and Culture - lab collect  -     Urine Microscopic Exam  -     Urine Culture    Urinary tract infection with hematuria, site unspecified  -     nitroFURantoin macrocrystal-monohydrate (MACROBID) 100 MG capsule; Take 1 capsule (100 mg) by mouth 2 times daily for 7 days      Return in about 3 days (around 10/1/2021) for if symptoms are not improving .    25 minutes spent on the date of the encounter doing chart review, history and exam, documentation and further activities per the note.     Subjective   Clarisse Dubon is a 65 year old year old female who presents with the following concerns:     Urinary symptoms - patient reports frequent urination, dysuria, nausea and chills ongoing for the past 2 days.  She has not had any measured fevers or low back pain.  History of UTI this past April. Patient does have a history of kidney stones.    She has been drinking a lot of water, but notes no visible blood in the urine.      Patient Active Problem List   Diagnosis     Paroxysmal Atrial Fibrillation     Essential Hypercholesterolemia     Osteoarthritis Of The Knee     Osteopenia     Vitamin D Deficiency     Herpes Simplex Type I     Bradycardia by electrocardiogram     Status post ablation of atrial fibrillation     Foot pain, left     High risk medication use     Cyclic citrullinated peptide (CCP) antibody positive     Dysuria     Cellulitis     Facial cellulitis     Contact dermatitis and eczema due to plant     Frequent urination     Yeast infection of the vagina     Muscle strain     Past Surgical History:   Procedure Laterality Date     BIOPSY BREAST Right 09/26/2007    Biopsy Breast Percutaneous Needle Core 7;  Comments: Benign results     CARDIAC ELECTROPHYSIOLOGY MAPPING AND  "ABLATION  12/17/14    Pulmonary vein isolation for AF     CERVIX LESION DESTRUCTION  June 1990    for ARAM I     DILATION AND CURETTAGE      for menorrhagia, Hgb 5.7     HYSTERECTOMY VAGINAL   07/25/2007    With A/P repair and enterocele repair for benign reasons     LAPAROSCOPIC HYSTERECTOMY TOTAL         Social History     Tobacco Use     Smoking status: Never Smoker     Smokeless tobacco: Never Used   Substance Use Topics     Alcohol use: Yes     Family History   Problem Relation Age of Onset     Heart Disease Mother      Diabetes Type 2  Mother      Hypertension Mother      Coronary Artery Disease Father      Heart Disease Father      Diabetes Type 2  Father      Hypertension Father      Atrial fibrillation Sister      Hypertension Brother      Coronary Artery Disease Brother      Leukemia Brother      Heart Disease Brother      Cerebrovascular Disease Sister      Lupus Sister          Current Outpatient Medications   Medication Sig Dispense Refill     calcium citrate-vitamin D (CITRACAL+D) 315-200 mg-unit per tablet [CALCIUM CITRATE-VITAMIN D (CITRACAL+D) 315-200 MG-UNIT PER TABLET] Take 1 tablet by mouth 2 (two) times a day.       cholecalciferol, vitamin D3, 1,000 unit tablet [CHOLECALCIFEROL, VITAMIN D3, 1,000 UNIT TABLET] Take 1,000 Units by mouth daily.       multivit-min/ferrous fumarate (MULTI VITAMIN ORAL) [MULTIVIT-MIN/FERROUS FUMARATE (MULTI VITAMIN ORAL)] Take by mouth daily.       No Known Allergies    ROS:   Negative except as noted above in HPI.     Objective  /78   Pulse 52   Temp 97.7  F (36.5  C)   Ht 1.676 m (5' 6\")   Wt 59.4 kg (131 lb)   SpO2 96%   Breastfeeding No   BMI 21.14 kg/m       General: Alert, no acute distress.   Affect: pleasant, cooperative.  Abdomen: soft , mild diffuse abdominal tenderness. No CVA tenderness.   Neuro: alert, interactive. moving all extremities.       Component      Latest Ref Rng & Units 9/28/2021   Color Urine      Colorless, Straw, Light Yellow, " Yellow Yellow   Appearance Urine      Clear Clear   Glucose Urine      Negative mg/dL Negative   Bilirubin Urine      Negative Negative   Ketones Urine      Negative mg/dL Negative   Specific Gravity Urine      1.005 - 1.030 1.010   Blood Urine      Negative Moderate (A)   pH Urine      5.0 - 8.0 6.5   Protein Albumin Urine      Negative mg/dL Negative   Urobilinogen Urine      0.2, 1.0 E.U./dL 0.2   Nitrite Urine      Negative Positive (A)   Leukocyte Esterase Urine      Negative Trace (A)   Bacteria Urine      None Seen /HPF Few (A)   RBC Urine      0-2 /HPF /HPF 0-2   WBC Urine      0-5 /HPF /HPF 5-10 (A)       Nicolas Wilkerson MD   Family medicine physician  [facility]

## 2021-09-29 LAB — BACTERIA UR CULT: NO GROWTH

## 2021-10-10 ENCOUNTER — HEALTH MAINTENANCE LETTER (OUTPATIENT)
Age: 66
End: 2021-10-10

## 2022-03-27 ENCOUNTER — HEALTH MAINTENANCE LETTER (OUTPATIENT)
Age: 67
End: 2022-03-27

## 2022-09-24 ENCOUNTER — HEALTH MAINTENANCE LETTER (OUTPATIENT)
Age: 67
End: 2022-09-24

## 2022-10-05 ENCOUNTER — NURSE TRIAGE (OUTPATIENT)
Dept: NURSING | Facility: CLINIC | Age: 67
End: 2022-10-05

## 2022-10-05 ENCOUNTER — VIRTUAL VISIT (OUTPATIENT)
Dept: FAMILY MEDICINE | Facility: CLINIC | Age: 67
End: 2022-10-05
Payer: COMMERCIAL

## 2022-10-05 DIAGNOSIS — R30.0 DYSURIA: Primary | ICD-10-CM

## 2022-10-05 PROCEDURE — 99213 OFFICE O/P EST LOW 20 MIN: CPT | Mod: 95 | Performed by: PHYSICIAN ASSISTANT

## 2022-10-05 RX ORDER — NITROFURANTOIN 25; 75 MG/1; MG/1
100 CAPSULE ORAL 2 TIMES DAILY
Qty: 14 CAPSULE | Refills: 0 | Status: SHIPPED | OUTPATIENT
Start: 2022-10-05 | End: 2022-10-12

## 2022-10-05 NOTE — TELEPHONE ENCOUNTER
"\"I am pretty sure I have a bladder infection.\"    Patient reporting symptoms starting yesterday 10/4/22.    Urinary frequency, dysuria. Symptoms similar to previous UTI's.    Denies back or flank pain. Afebrile.     Patient is requesting prescription/antibiotic.     Transferred to Central Scheduling to request telephone visit/no available appointments in person today.    Yahaira Damon RN  [facility]      Reason for Disposition    Pain or burning with passing urine (urination) and female    Age > 50 years    Additional Information    Negative: Shock suspected (e.g., cold/pale/clammy skin, too weak to stand, low BP, rapid pulse)    Negative: Sounds like a life-threatening emergency to the triager    Negative: Shock suspected (e.g., cold/pale/clammy skin, too weak to stand, low BP, rapid pulse)    Negative: Sounds like a life-threatening emergency to the triager    Negative: Unable to urinate (or only a few drops) and bladder feels very full    Negative: Vomiting    Negative: Patient sounds very sick or weak to the triager    Negative: SEVERE pain with urination    Negative: Fever > 100.4 F (38.0 C)    Negative: Side (flank) or lower back pain present    Negative: Taking antibiotic > 24 hours for UTI and fever persists    Negative: Taking antibiotic > 3 days for UTI and painful urination not improved    Negative: Unusual vaginal discharge    Negative: > 2 UTIs in last year    Negative: Patient is worried they have a sexually transmitted infection (STI)    Protocols used: URINARY SYMPTOMS-A-[place], URINATION PAIN - FEMALE-A-[place]      "

## 2022-10-05 NOTE — PROGRESS NOTES
"Clarisse is a 66 year old who is being evaluated via a billable telephone visit.      What phone number would you like to be contacted at? [phone]   How would you like to obtain your AVS? MyChart    Assessment & Plan     Dysuria  Offered UA, pt declined. Symptoms feel like her \"normal UTI\".  If no improvement will follow-up in clinic/UC or urology  - nitroFURantoin macrocrystal-monohydrate (MACROBID) 100 MG capsule; Take 1 capsule (100 mg) by mouth 2 times daily for 7 days                 No follow-ups on file.    USMAN Francisco [facility]    Subjective   Clarisse is a 66 year old, presenting for the following health issues:  UTI      HPI     Genitourinary - Female  Onset/Duration: Last night   Description:   Painful urination (Dysuria): YES           Frequency: YES  Blood in urine (Hematuria): No  Delay in urine (Hesitency): No  Intensity: mild, moderate  Progression of Symptoms:  same  Accompanying Signs & Symptoms:  Fever/chills: No  Flank pain: No  Nausea and vomiting: No  Vaginal symptoms: none  Abdominal/Pelvic Pain: YES--milf  History:    History of frequent UTI s: YES  History of kidney stones: YES  Sexually Active: YES  Possibility of pregnancy: No    Patient does have urologist    Review of Systems   Constitutional, HEENT, cardiovascular, pulmonary, gi and gu systems are negative, except as otherwise noted.      Objective    Vitals - Patient Reported  Weight (Patient Reported): 54.4 kg (120 lb)  Height (Patient Reported): 167.6 cm (5' 6\")  BMI (Based on Pt Reported Ht/Wt): 19.37      Vitals:  No vitals were obtained today due to virtual visit.    Physical Exam   healthy, alert and no distress  PSYCH: Alert and oriented times 3; coherent speech, normal   rate and volume, able to articulate logical thoughts, able   to abstract reason, no tangential thoughts, no hallucinations   or delusions  Her affect is normal and pleasant  RESP: No cough, no audible wheezing, able to talk " in full sentences  Remainder of exam unable to be completed due to telephone visits                Phone call duration: 6 minutes

## 2023-01-04 ENCOUNTER — TELEPHONE (OUTPATIENT)
Dept: RHEUMATOLOGY | Facility: CLINIC | Age: 68
End: 2023-01-04

## 2023-01-04 NOTE — TELEPHONE ENCOUNTER
[facility] Call Center    Phone Message    May a detailed message be left on voicemail: yes     Reason for Call: Appointment Intake          Pt was last seen by Dr. Reed 09/09/2019, Will Dr Reed see pt as return or will she be considered new? If consider new will Dr. Reed still see pt or does she need to est care with another provider ? Does pt need an updated referral as well? Please reach out to pt.         Action Taken: Other: Mplw rheum    Travel Screening: Not Applicable

## 2023-01-24 ENCOUNTER — OFFICE VISIT (OUTPATIENT)
Dept: FAMILY MEDICINE | Facility: CLINIC | Age: 68
End: 2023-01-24
Payer: COMMERCIAL

## 2023-01-24 ENCOUNTER — ANCILLARY PROCEDURE (OUTPATIENT)
Dept: GENERAL RADIOLOGY | Facility: CLINIC | Age: 68
End: 2023-01-24
Attending: FAMILY MEDICINE
Payer: COMMERCIAL

## 2023-01-24 VITALS
WEIGHT: 128 LBS | BODY MASS INDEX: 20.57 KG/M2 | SYSTOLIC BLOOD PRESSURE: 100 MMHG | TEMPERATURE: 98.1 F | OXYGEN SATURATION: 98 % | DIASTOLIC BLOOD PRESSURE: 64 MMHG | RESPIRATION RATE: 14 BRPM | HEIGHT: 66 IN | HEART RATE: 61 BPM

## 2023-01-24 DIAGNOSIS — Z23 IMMUNIZATION DUE: ICD-10-CM

## 2023-01-24 DIAGNOSIS — R76.8 CYCLIC CITRULLINATED PEPTIDE (CCP) ANTIBODY POSITIVE: ICD-10-CM

## 2023-01-24 DIAGNOSIS — E78.00 PURE HYPERCHOLESTEROLEMIA: ICD-10-CM

## 2023-01-24 DIAGNOSIS — M17.10 UNILATERAL PRIMARY OSTEOARTHRITIS, UNSPECIFIED KNEE: Chronic | ICD-10-CM

## 2023-01-24 DIAGNOSIS — M79.672 FOOT PAIN, LEFT: Primary | ICD-10-CM

## 2023-01-24 DIAGNOSIS — M79.672 FOOT PAIN, LEFT: ICD-10-CM

## 2023-01-24 DIAGNOSIS — Z12.31 VISIT FOR SCREENING MAMMOGRAM: ICD-10-CM

## 2023-01-24 DIAGNOSIS — Z13.220 SCREENING FOR HYPERLIPIDEMIA: ICD-10-CM

## 2023-01-24 LAB
BASOPHILS # BLD AUTO: 0 10E3/UL (ref 0–0.2)
BASOPHILS NFR BLD AUTO: 2 %
CHOLEST SERPL-MCNC: 240 MG/DL
EOSINOPHIL # BLD AUTO: 0.1 10E3/UL (ref 0–0.7)
EOSINOPHIL NFR BLD AUTO: 2 %
ERYTHROCYTE [DISTWIDTH] IN BLOOD BY AUTOMATED COUNT: 12.2 % (ref 10–15)
ERYTHROCYTE [SEDIMENTATION RATE] IN BLOOD BY WESTERGREN METHOD: 7 MM/HR (ref 0–20)
HCT VFR BLD AUTO: 41.2 % (ref 35–47)
HDLC SERPL-MCNC: 69 MG/DL
HGB BLD-MCNC: 13.2 G/DL (ref 11.7–15.7)
IMM GRANULOCYTES # BLD: 0 10E3/UL
IMM GRANULOCYTES NFR BLD: 0 %
LDLC SERPL CALC-MCNC: 152 MG/DL
LYMPHOCYTES # BLD AUTO: 1.4 10E3/UL (ref 0.8–5.3)
LYMPHOCYTES NFR BLD AUTO: 53 %
MCH RBC QN AUTO: 28.5 PG (ref 26.5–33)
MCHC RBC AUTO-ENTMCNC: 32 G/DL (ref 31.5–36.5)
MCV RBC AUTO: 89 FL (ref 78–100)
MONOCYTES # BLD AUTO: 0.3 10E3/UL (ref 0–1.3)
MONOCYTES NFR BLD AUTO: 11 %
NEUTROPHILS # BLD AUTO: 0.8 10E3/UL (ref 1.6–8.3)
NEUTROPHILS NFR BLD AUTO: 32 %
NONHDLC SERPL-MCNC: 171 MG/DL
NRBC # BLD AUTO: 0 10E3/UL
NRBC BLD AUTO-RTO: 0 /100
PLATELET # BLD AUTO: 161 10E3/UL (ref 150–450)
RBC # BLD AUTO: 4.63 10E6/UL (ref 3.8–5.2)
TRIGL SERPL-MCNC: 95 MG/DL
WBC # BLD AUTO: 2.7 10E3/UL (ref 4–11)

## 2023-01-24 PROCEDURE — 80061 LIPID PANEL: CPT | Performed by: FAMILY MEDICINE

## 2023-01-24 PROCEDURE — 73630 X-RAY EXAM OF FOOT: CPT | Mod: TC | Performed by: RADIOLOGY

## 2023-01-24 PROCEDURE — 36415 COLL VENOUS BLD VENIPUNCTURE: CPT | Performed by: FAMILY MEDICINE

## 2023-01-24 PROCEDURE — 85025 COMPLETE CBC W/AUTO DIFF WBC: CPT | Performed by: FAMILY MEDICINE

## 2023-01-24 PROCEDURE — 99214 OFFICE O/P EST MOD 30 MIN: CPT | Performed by: FAMILY MEDICINE

## 2023-01-24 PROCEDURE — 85652 RBC SED RATE AUTOMATED: CPT | Performed by: FAMILY MEDICINE

## 2023-01-24 RX ORDER — CELECOXIB 100 MG/1
100 CAPSULE ORAL 2 TIMES DAILY PRN
Qty: 60 CAPSULE | Refills: 1 | Status: SHIPPED | OUTPATIENT
Start: 2023-01-24 | End: 2023-03-15

## 2023-01-24 NOTE — PROGRESS NOTES
Assessment & Plan     Foot pain, left  -Unsure etiology, xray does not show obvious bony abnormality and labs do not show signs of infection. Will refer to podiatry for further evaluation. She can try Celebrex as well  - celecoxib (CELEBREX) 100 MG capsule  Dispense: 60 capsule; Refill: 1  - Orthopedic  Referral  - CBC with platelets and differential  - ESR: Erythrocyte sedimentation rate  - XR Foot Left G/E 3 Views  - CBC with platelets and differential  - ESR: Erythrocyte sedimentation rate    Osteoarthritis Of The Knee  -Will follow up with rheumatology, offered aspiration today but she is doing better today, will defer. Ok for celebrex as well as listed.   - Adult Rheumatology  Referral  - celecoxib (CELEBREX) 100 MG capsule  Dispense: 60 capsule; Refill: 1    Screening for hyperlipidemia  - Lipid panel reflex to direct LDL Non-fasting  - Lipid panel reflex to direct LDL Non-fasting    Pure hypercholesterolemia  - Lipid panel reflex to direct LDL Non-fasting  - Lipid panel reflex to direct LDL Non-fasting    Visit for screening mammogram  -patient unsure she wants to do this, order provided for her.   - MA SCREENING DIGITAL BILAT - Future  (s+30)    Cyclic citrullinated peptide (CCP) antibody positive  - Adult Rheumatology  Referral  - celecoxib (CELEBREX) 100 MG capsule  Dispense: 60 capsule; Refill: 1    Immunization due  - TDAP VACCINE (Adacel, Boostrix)  - Pneumococcal 20 Valent Conjugate (PCV20)                 No follow-ups on file.    Scarlet Zuniga MD  [facility]    Lee Robb is a 67 year old, presenting for the following health issues:  Musculoskeletal Problem (Right knee problem- has been getting worse for 6 months. Painful, swollen, now affecting the way that she walks. //Left foot problem - ongoing for roughly 3 months.)      History of Present Illness       Reason for visit:  Swollen painful knee getting progressively worse    She  "eats 2-3 servings of fruits and vegetables daily.She consumes 0 sweetened beverage(s) daily.She exercises with enough effort to increase her heart rate 30 to 60 minutes per day.  She exercises with enough effort to increase her heart rate 4 days per week.   She is taking medications regularly.       She does have some occasional right knee pain. She does have some swelling above the knee as well. She does have some shifting as well and instability in he rknee. She has never had this before.     She was told that she had RA, would occasionally getting some drainage of the knee prior to vacation. She is going to see rheumatology in April, but will have to get a new one after that. She has been on Methotrexate and hydroxychlorquine as well.     A few months ago she did note that her left foot on the toe she has some swelling. There is no pain. She has been ignoring this. She does have a squishy structure at the base of the second and third toes.     She has not been in for the last 3 years or so. She is not on any medication.     Review of Systems   Per above      Objective    /64   Pulse 61   Temp 98.1  F (36.7  C)   Resp 14   Ht 1.676 m (5' 6\")   Wt 58.1 kg (128 lb)   SpO2 98%   BMI 20.66 kg/m    Body mass index is 20.66 kg/m .  Physical Exam   GENERAL: healthy, alert and no distress  NECK: no adenopathy, no asymmetry, masses, or scars and thyroid normal to palpation  RESP: lungs clear to auscultation - no rales, rhonchi or wheezes  CV: regular rate and rhythm, normal S1 S2, no S3 or S4, no murmur, click or rub, no peripheral edema and peripheral pulses strong  MS: Right knee shows minimal swelling over the medial joint line. She does have a cystic like structure between her second and third toes on the left, over the dorsum of the foot          "

## 2023-02-21 ENCOUNTER — TELEPHONE (OUTPATIENT)
Dept: VASCULAR SURGERY | Facility: CLINIC | Age: 68
End: 2023-02-21

## 2023-02-21 ENCOUNTER — OFFICE VISIT (OUTPATIENT)
Dept: PODIATRY | Facility: CLINIC | Age: 68
End: 2023-02-21
Payer: COMMERCIAL

## 2023-02-21 ENCOUNTER — HOSPITAL ENCOUNTER (OUTPATIENT)
Dept: MRI IMAGING | Facility: CLINIC | Age: 68
Discharge: HOME | End: 2023-02-21
Attending: PODIATRIST
Payer: COMMERCIAL

## 2023-02-21 VITALS
TEMPERATURE: 97.8 F | DIASTOLIC BLOOD PRESSURE: 68 MMHG | BODY MASS INDEX: 20.66 KG/M2 | SYSTOLIC BLOOD PRESSURE: 124 MMHG | RESPIRATION RATE: 18 BRPM | WEIGHT: 128 LBS

## 2023-02-21 DIAGNOSIS — R22.42 MASS OF FOOT, LEFT: ICD-10-CM

## 2023-02-21 DIAGNOSIS — R22.42 MASS OF FOOT, LEFT: Primary | ICD-10-CM

## 2023-02-21 PROCEDURE — 99202 OFFICE O/P NEW SF 15 MIN: CPT | Performed by: PODIATRIST

## 2023-02-21 PROCEDURE — 255N000002 HC RX 255 OP 636: Performed by: PODIATRIST

## 2023-02-21 PROCEDURE — A9585 GADOBUTROL INJECTION: HCPCS | Performed by: PODIATRIST

## 2023-02-21 PROCEDURE — 73720 MRI LWR EXTREMITY W/O&W/DYE: CPT | Mod: LT

## 2023-02-21 RX ORDER — DICLOFENAC SODIUM 75 MG/1
1 TABLET, DELAYED RELEASE ORAL
COMMUNITY
Start: 2023-02-15 | End: 2023-03-15

## 2023-02-21 RX ORDER — GADOBUTROL 604.72 MG/ML
6 INJECTION INTRAVENOUS ONCE
Status: COMPLETED | OUTPATIENT
Start: 2023-02-21 | End: 2023-02-21

## 2023-02-21 RX ADMIN — GADOBUTROL 6 ML: 604.72 INJECTION INTRAVENOUS at 11:20

## 2023-02-21 ASSESSMENT — PAIN SCALES - GENERAL: PAINLEVEL: MILD PAIN (2)

## 2023-02-21 NOTE — PROGRESS NOTES
FOOT AND ANKLE SURGERY/PODIATRY CONSULT NOTE         ASSESSMENT: Soft tissue mass left foot      TREATMENT:  -There is a palpable soft tissue mass within the second intermetatarsal space left foot and between digits 2 and 3, mild pain to the palpation.  There is divergence of digits 2 and 3 left foot.    -Based on the above I discussed with the patient that the soft tissue lesion is difficult to define as being a ganglion cyst or some other type of mass.  I recommend an MRI for further evaluation.  Patient agrees with the treatment plan.    -I have referred her for a left foot MRI.  My office will help coordinate this imaging today.    -Patient's questions invited and answered. She was encouraged to call my office with any further questions or concerns.     -I will contact the patient with the MRI report when available and we will be guided by the results.     Trenton Acevedo DPM  [facility] Podiatry/Foot & Ankle Surgery      HPI: I was asked to see Clarisse Dubon today for a soft tissue mass on her left foot.  The patient states she first noticed the mass approximately 4 to 5 months ago and denies trauma.  She has noticed the mass increased in size over this period of time and has also had increasing pain.  Denies previous treatment.    Past Medical History:   Diagnosis Date     Arthritis      Atrial fibrillation (H)     Diagnosed 1/2/14.  CHADS2 - VASc score = 1 (gender) ASA Rx Sotalol (bradycardia) Flecainide pre-ablation PVI Dec 2014        Cellulitis of ankle     Right     Cervical dysplasia      Contact dermatitis and other eczema due to plants (except food)     Poison Ivy     Edema     due to arthritis     Herpes simplex     type 1     History of transfusion      Hyperlipidemia      Insufficiency fracture of tibia 11/20/2015    Right ankle insufficiency fracture     Rheumatoid arthritis (H)      Rheumatoid arthritis (H)      Sinus bradycardia      Ureteral stone     Left     Vitamin D deficiency           Social History     Socioeconomic History     Marital status:      Spouse name: Not on file     Number of children: Not on file     Years of education: Not on file     Highest education level: Not on file   Occupational History     Not on file   Tobacco Use     Smoking status: Never     Smokeless tobacco: Never   Vaping Use     Vaping Use: Never used   Substance and Sexual Activity     Alcohol use: Not Currently     Drug use: No     Sexual activity: Yes     Partners: Male     Birth control/protection: Surgical   Other Topics Concern     Not on file   Social History Narrative     Not on file     Social Determinants of Health     Financial Resource Strain: Not on file   Food Insecurity: Not on file   Transportation Needs: Not on file   Physical Activity: Not on file   Stress: Not on file   Social Connections: Not on file   Intimate Partner Violence: Not on file   Housing Stability: Not on file          No Known Allergies      MEDICATIONS:   Current Outpatient Medications   Medication     calcium citrate-vitamin D (CITRACAL+D) 315-200 mg-unit per tablet     cholecalciferol, vitamin D3, 1,000 unit tablet     diclofenac (VOLTAREN) 75 MG EC tablet     multivit-min/ferrous fumarate (MULTI VITAMIN ORAL)     celecoxib (CELEBREX) 100 MG capsule     No current facility-administered medications for this visit.        Family History   Problem Relation Age of Onset     Heart Disease Mother      Diabetes Type 2  Mother      Hypertension Mother      Coronary Artery Disease Father      Heart Disease Father      Diabetes Type 2  Father      Hypertension Father      Atrial fibrillation Sister      Hypertension Brother      Coronary Artery Disease Brother      Leukemia Brother      Heart Disease Brother      Cerebrovascular Disease Sister      Lupus Sister           Review of Systems - 10 point Review of Systems is negative except for soft tissue mass left foot which is noted in HPI.    OBJECTIVE:  Appearance: alert, well  appearing, and in no distress.    VITAL SIGNS: /68   Temp 97.8  F (36.6  C)   Resp 18   Wt 58.1 kg (128 lb)   BMI 20.66 kg/m        General appearance: Patient is alert and fully cooperative with history & exam.  No sign of distress is noted during the visit.     Psychiatric: Affect is pleasant & appropriate.  Patient appears motivated to improve health.     Respiratory: Breathing is regular & unlabored while sitting.     HEENT: Hearing is intact to spoken word.  Speech is clear.  No gross evidence of visual impairment that would impact ambulation.      Vascular: Dorsalis pedis and posterior tibial pulses are palpable. There is pedal hair growth left.  CFT < 3 sec from anterior tibial surface to distal digits left. There is no appreciable edema noted.  Dermatologic: Turgor and texture are within normal limits. No coloration or temperature changes. No primary or secondary lesions noted.  Neurologic: All epicritic and proprioceptive sensations are grossly intact left.  Musculoskeletal: Palpable soft tissue mass within the second intermetatarsal space left foot and between digits 2 and 3, mild pain to the palpation.  There is divergence of digits 2 and 3 left foot.

## 2023-02-21 NOTE — TELEPHONE ENCOUNTER
I reviewed the left foot MRI report with the patient today: 1.  There is a nonenhancing mass in the webspace between the second and third MTP joints, although it does not appear entirely cystic on the T2-weighted imaging. This may be secondary to the presence of some proteinaceous debris. It is likely primarily   cystic, however. It measures 1.4 x 1.7 x 3.7 cm. It elicits no surrounding inflammation or edema and is likely chronic and indolent.  2.  No evidence for fracture or marrow signal abnormality.  3.  Mild degenerative change at the first and second MTP joints.  4.  No evidence for tendinous or ligamentous pathology.  5.  Exam otherwise negative.    Based on the above we discussed nonsurgical and surgical options for removal of the mass.  Surgical removal require day surgery at [facility] nonweightbearing postoperatively.  Risk of surgery include infection recurrence and nerve damage.  All questions about and answered.    Patient states she would like to consider these options overnight and will contact my office tomorrow with her decision.

## 2023-02-21 NOTE — LETTER
2/21/2023         RE: Clarisse Dubon  [address] [address] [facility] [address]        Dear Colleague,    Thank you for referring your patient, Clarisse Dubon, to the [facility]. Please see a copy of my visit note below.          FOOT AND ANKLE SURGERY/PODIATRY CONSULT NOTE         ASSESSMENT: Soft tissue mass left foot      TREATMENT:  -There is a palpable soft tissue mass within the second intermetatarsal space left foot and between digits 2 and 3, mild pain to the palpation.  There is divergence of digits 2 and 3 left foot.    -Based on the above I discussed with the patient that the soft tissue lesion is difficult to define as being a ganglion cyst or some other type of mass.  I recommend an MRI for further evaluation.  Patient agrees with the treatment plan.    -I have referred her for a left foot MRI.  My office will help coordinate this imaging today.    -Patient's questions invited and answered. She was encouraged to call my office with any further questions or concerns.     -I will contact the patient with the MRI report when available and we will be guided by the results.     Trenton Acevedo DPM  [facility] Podiatry/Foot & Ankle Surgery      HPI: I was asked to see Clarisse Dubon today for a soft tissue mass on her left foot.  The patient states she first noticed the mass approximately 4 to 5 months ago and denies trauma.  She has noticed the mass increased in size over this period of time and has also had increasing pain.  Denies previous treatment.    Past Medical History:   Diagnosis Date     Arthritis      Atrial fibrillation (H)     Diagnosed 1/2/14.  CHADS2 - VASc score = 1 (gender) ASA Rx Sotalol (bradycardia) Flecainide pre-ablation PVI Dec 2014        Cellulitis of ankle     Right     Cervical dysplasia      Contact dermatitis and other eczema due to plants (except food)     Poison Ivy     Edema     due to arthritis     Herpes simplex     type 1      History of transfusion      Hyperlipidemia      Insufficiency fracture of tibia 11/20/2015    Right ankle insufficiency fracture     Rheumatoid arthritis (H)      Rheumatoid arthritis (H)      Sinus bradycardia      Ureteral stone     Left     Vitamin D deficiency          Social History     Socioeconomic History     Marital status:      Spouse name: Not on file     Number of children: Not on file     Years of education: Not on file     Highest education level: Not on file   Occupational History     Not on file   Tobacco Use     Smoking status: Never     Smokeless tobacco: Never   Vaping Use     Vaping Use: Never used   Substance and Sexual Activity     Alcohol use: Not Currently     Drug use: No     Sexual activity: Yes     Partners: Male     Birth control/protection: Surgical   Other Topics Concern     Not on file   Social History Narrative     Not on file     Social Determinants of Health     Financial Resource Strain: Not on file   Food Insecurity: Not on file   Transportation Needs: Not on file   Physical Activity: Not on file   Stress: Not on file   Social Connections: Not on file   Intimate Partner Violence: Not on file   Housing Stability: Not on file          No Known Allergies      MEDICATIONS:   Current Outpatient Medications   Medication     calcium citrate-vitamin D (CITRACAL+D) 315-200 mg-unit per tablet     cholecalciferol, vitamin D3, 1,000 unit tablet     diclofenac (VOLTAREN) 75 MG EC tablet     multivit-min/ferrous fumarate (MULTI VITAMIN ORAL)     celecoxib (CELEBREX) 100 MG capsule     No current facility-administered medications for this visit.        Family History   Problem Relation Age of Onset     Heart Disease Mother      Diabetes Type 2  Mother      Hypertension Mother      Coronary Artery Disease Father      Heart Disease Father      Diabetes Type 2  Father      Hypertension Father      Atrial fibrillation Sister      Hypertension Brother      Coronary Artery Disease Brother       Leukemia Brother      Heart Disease Brother      Cerebrovascular Disease Sister      Lupus Sister           Review of Systems - 10 point Review of Systems is negative except for soft tissue mass left foot which is noted in HPI.    OBJECTIVE:  Appearance: alert, well appearing, and in no distress.    VITAL SIGNS: /68   Temp 97.8  F (36.6  C)   Resp 18   Wt 58.1 kg (128 lb)   BMI 20.66 kg/m        General appearance: Patient is alert and fully cooperative with history & exam.  No sign of distress is noted during the visit.     Psychiatric: Affect is pleasant & appropriate.  Patient appears motivated to improve health.     Respiratory: Breathing is regular & unlabored while sitting.     HEENT: Hearing is intact to spoken word.  Speech is clear.  No gross evidence of visual impairment that would impact ambulation.      Vascular: Dorsalis pedis and posterior tibial pulses are palpable. There is pedal hair growth left.  CFT < 3 sec from anterior tibial surface to distal digits left. There is no appreciable edema noted.  Dermatologic: Turgor and texture are within normal limits. No coloration or temperature changes. No primary or secondary lesions noted.  Neurologic: All epicritic and proprioceptive sensations are grossly intact left.  Musculoskeletal: Palpable soft tissue mass within the second intermetatarsal space left foot and between digits 2 and 3, mild pain to the palpation.  There is divergence of digits 2 and 3 left foot.            Again, thank you for allowing me to participate in the care of your patient.        Sincerely,        Trenton Acevedo DPM

## 2023-03-03 NOTE — TELEPHONE ENCOUNTER
Patient would like to schedule surgery with Dr. Acevedo. She does not have a preference as to when it is scheduled. OK to  if needed: [phone]

## 2023-03-07 ENCOUNTER — TELEPHONE (OUTPATIENT)
Dept: VASCULAR SURGERY | Facility: CLINIC | Age: 68
End: 2023-03-07
Payer: COMMERCIAL

## 2023-03-07 ENCOUNTER — PREP FOR PROCEDURE (OUTPATIENT)
Dept: VASCULAR SURGERY | Facility: CLINIC | Age: 68
End: 2023-03-07
Payer: COMMERCIAL

## 2023-03-07 ENCOUNTER — DOCUMENTATION ONLY (OUTPATIENT)
Dept: VASCULAR SURGERY | Facility: CLINIC | Age: 68
End: 2023-03-07
Payer: COMMERCIAL

## 2023-03-07 DIAGNOSIS — M67.472 GANGLION CYST OF LEFT FOOT: Primary | ICD-10-CM

## 2023-03-07 NOTE — TELEPHONE ENCOUNTER
I returned the patient's phone call today to discuss her desire to move forward with left foot surgery.  She reports that she would like to schedule surgery to remove the cyst from the left foot.  I again reviewed the left foot MRI report with the patient today: 1.  There is a nonenhancing mass in the webspace between the second and third MTP joints, although it does not appear entirely cystic on the T2-weighted imaging. This may be secondary to the presence of some proteinaceous debris. It is likely primarily   cystic, however. It measures 1.4 x 1.7 x 3.7 cm. It elicits no surrounding inflammation or edema and is likely chronic and indolent.  2.  No evidence for fracture or marrow signal abnormality.  3.  Mild degenerative change at the first and second MTP joints.  4.  No evidence for tendinous or ligamentous pathology.  5.  Exam otherwise negative.     Based on the above we discussed nonsurgical and surgical options for removal of the mass.  Surgical removal require day surgery at [facility] nonweightbearing postoperatively.  Risk of surgery include infection recurrence and nerve damage.  All questions about and answered.  Patient consents to surgery.    Patient states that she has crutches and a knee walker in her possession to remain nonweightbearing.     I will ask my office to coordinate surgery at [facility].

## 2023-03-08 ENCOUNTER — DOCUMENTATION ONLY (OUTPATIENT)
Dept: VASCULAR SURGERY | Facility: CLINIC | Age: 68
End: 2023-03-08
Payer: COMMERCIAL

## 2023-03-08 PROBLEM — M67.472 GANGLION CYST OF LEFT FOOT: Status: ACTIVE | Noted: 2023-03-08

## 2023-03-08 NOTE — PROGRESS NOTES
Surgery Scheduled      Surgery/Procedure: EXCISION, GANGLION CYST, left foot    CPT: 93408     Equipment: pulse lavage    Location: [facility]:  [address] [place] [id number] (Fax: [phone])    Date: 3/20/23    Time: 12:30PM    Admission Type: Outpatient    Surgeon: Dr. Acevedo    OR Confirmed & :  Yes with Dary SUAZO on 3/8/2023    Post Op: 3/29/23 9:40AM  & 4/7/23 2:20 PM    Blood Thinners Addressed: N/A    Stress Test Clearance: NO

## 2023-03-15 NOTE — H&P (VIEW-ONLY)
[facility]  [address] [facility] [address]  [place] [facility] [address]  Phone: [phone]  Fax: [phone]  Primary Provider: Scarlet Zuniga  Pre-op Performing Provider: SEAN ARTIS      PREOPERATIVE EVALUATION:  Today's date: 3/16/2023    Clarisse Dubon is a 67 year old female who presents for a preoperative evaluation.    Surgical Information:  Surgery/Procedure: EXCISION, GANGLION CYST, left foot  Surgery Location: [facility]  Surgeon: Trenton Acevedo DPM  Surgery Date: 3/20/2023  Time of Surgery: 1:30 PM  Where patient plans to recover: At home with family  Fax number for surgical facility: Note does not need to be faxed, will be available electronically in Epic.    Type of Anesthesia Anticipated: General    Assessment & Plan     The proposed surgical procedure is considered INTERMEDIATE risk.    Preop general physical exam  Medically optimized for surgery  - EKG 12-lead, tracing only    Ganglion cyst of left foot  Planned surgical correction    Essential Hypercholesterolemia  Not on statin therapy.    Status post ablation of atrial fibrillation  No evidence of recurrence             Risks and Recommendations:  The patient has the following additional risks and recommendations for perioperative complications:   - No identified additional risk factors other than previously addressed    Medication Instructions:  Hold NSAIDs and supplements 1 week prior    RECOMMENDATION:  APPROVAL GIVEN to proceed with proposed procedure, without further diagnostic evaluation.      Reviewed family medicine note x1, podiatry note x1, lipids x1, CBC x1, ECG x1    Subjective     HPI related to upcoming procedure: Patient has been dealing with a growing mass along her foot.  Diagnosed with ganglion cyst and surgical correction was recommended.    Preop Questions 3/13/2023   1. Have you ever had a heart attack or stroke? No   2. Have you ever had surgery on  your heart or blood vessels, such as a stent placement, a coronary artery bypass, or surgery on an artery in your head, neck, heart, or legs? YES - cardiac ablation   3. Do you have chest pain with activity? No   4. Do you have a history of  heart failure? No   5. Do you currently have a cold, bronchitis or symptoms of other infection? No   6. Do you have a cough, shortness of breath, or wheezing? No   7. Do you or anyone in your family have previous history of blood clots? No   8. Do you or does anyone in your family have a serious bleeding problem such as prolonged bleeding following surgeries or cuts? No   9. Have you ever had problems with anemia or been told to take iron pills? YES - many years ago related to menses and pregnancy.   10. Have you had any abnormal blood loss such as black, tarry or bloody stools, or abnormal vaginal bleeding? No   11. Have you ever had a blood transfusion? No   12. Are you willing to have a blood transfusion if it is medically needed before, during, or after your surgery? Yes   13. Have you or any of your relatives ever had problems with anesthesia? No   14. Do you have sleep apnea, excessive snoring or daytime drowsiness? No   15. Do you have any artifical heart valves or other implanted medical devices like a pacemaker, defibrillator, or continuous glucose monitor? No   16. Do you have artificial joints? No   17. Are you allergic to latex? No       Health Care Directive:  Patient does not have a Health Care Directive or Living Will: Discussed advance care planning with patient; information given to patient to review.    Preoperative Review of :   reviewed - no record of controlled substances prescribed.    Status of Chronic Conditions:  See problem list for active medical problems.  Problems all longstanding and stable, except as noted/documented.  See ROS for pertinent symptoms related to these conditions.      Review of Systems  CONSTITUTIONAL: NEGATIVE for fever,  chills, change in weight  INTEGUMENTARY/SKIN: NEGATIVE for worrisome rashes, moles or lesions  EYES: NEGATIVE for vision changes or irritation  ENT/MOUTH: NEGATIVE for ear, mouth and throat problems  RESP: NEGATIVE for significant cough or SOB  CV: NEGATIVE for chest pain, palpitations or peripheral edema  GI: NEGATIVE for nausea, abdominal pain, heartburn, or change in bowel habits  : NEGATIVE for frequency, dysuria, or hematuria  MUSCULOSKELETAL: NEGATIVE for significant arthralgias or myalgia  NEURO: NEGATIVE for weakness, dizziness or paresthesias  ENDOCRINE: NEGATIVE for temperature intolerance, skin/hair changes  HEME: NEGATIVE for bleeding problems  PSYCHIATRIC: NEGATIVE for changes in mood or affect    Patient Active Problem List    Diagnosis Date Noted     Ganglion cyst of left foot 03/08/2023     Priority: Medium     Added automatically from request for surgery [id number]       Mass of foot, left 02/21/2023     Priority: Medium     Muscle strain 10/03/2018     Priority: Medium     Frequent urination 06/13/2018     Priority: Medium     Yeast infection of the vagina 06/13/2018     Priority: Medium     Cellulitis 06/03/2018     Priority: Medium     Facial cellulitis 06/03/2018     Priority: Medium     Contact dermatitis and eczema due to plant 06/03/2018     Priority: Medium     Herpes Simplex Type I      Priority: Medium     In genital area May 2000 following oral sex with her  who had a   cold sore.   First recurrence 2/21/2013.  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       Osteopenia      Priority: Medium     Created by Conversion  Replacement  Utility updated for latest IMO load         Vitamin D Deficiency      Priority: Medium     Created by Conversion  Replacement Utility updated for latest IMO load         High risk medication use 05/19/2016     Priority: Medium     Bradycardia by electrocardiogram      Priority: Medium     Created by Conversion         Foot pain, left 09/10/2015     Priority: Medium     Status post ablation of atrial fibrillation 12/17/2014     Priority: Medium     PVI December 17, 2014 (cryo--PVI + RA-CTI line)         Essential Hypercholesterolemia      Priority: Medium     Dx July 1999          Past Medical History:   Diagnosis Date     Arthritis      Atrial fibrillation (H)     Diagnosed 1/2/14.  CHADS2 - VASc score = 1 (gender) ASA Rx Sotalol (bradycardia) Flecainide pre-ablation PVI Dec 2014        Cellulitis of ankle     Right     Cervical dysplasia      Contact dermatitis and other eczema due to plants (except food)     Poison Ivy     Edema     due to arthritis     Herpes simplex     type 1     History of transfusion      Hyperlipidemia      Insufficiency fracture of tibia 11/20/2015    Right ankle insufficiency fracture     Motion sickness      PONV (postoperative nausea and vomiting)      Rheumatoid arthritis (H)      Rheumatoid arthritis (H)      Sinus bradycardia      Ureteral stone     Left     Vitamin D deficiency      Past Surgical History:   Procedure Laterality Date     BIOPSY BREAST Right 09/26/2007    Biopsy Breast Percutaneous Needle Core 7;  Comments: Benign results     CARDIAC ELECTROPHYSIOLOGY MAPPING AND ABLATION  12/17/14    Pulmonary vein isolation for AF     CERVIX LESION DESTRUCTION  June 1990    for ARAM I     DILATION AND CURETTAGE      for menorrhagia, Hgb 5.7     HYSTERECTOMY VAGINAL   07/25/2007    With A/P repair and enterocele repair for benign reasons     LAPAROSCOPIC HYSTERECTOMY TOTAL       Current Outpatient Medications   Medication Sig Dispense Refill     calcium citrate-vitamin D (CITRACAL+D)  "315-200 mg-unit per tablet [CALCIUM CITRATE-VITAMIN D (CITRACAL+D) 315-200 MG-UNIT PER TABLET] Take 1 tablet by mouth 2 (two) times a day.       cholecalciferol, vitamin D3, 1,000 unit tablet [CHOLECALCIFEROL, VITAMIN D3, 1,000 UNIT TABLET] Take 1,000 Units by mouth daily.       multivit-min/ferrous fumarate (MULTI VITAMIN ORAL) [MULTIVIT-MIN/FERROUS FUMARATE (MULTI VITAMIN ORAL)] Take by mouth daily.         No Known Allergies     Social History     Tobacco Use     Smoking status: Never     Smokeless tobacco: Never   Substance Use Topics     Alcohol use: Not Currently     Family History   Problem Relation Age of Onset     Heart Disease Mother      Diabetes Type 2  Mother      Hypertension Mother      Coronary Artery Disease Father      Heart Disease Father      Diabetes Type 2  Father      Hypertension Father      Atrial fibrillation Sister      Hypertension Brother      Coronary Artery Disease Brother      Leukemia Brother      Heart Disease Brother      Cerebrovascular Disease Sister      Lupus Sister      History   Drug Use No         Objective     /80 (BP Location: Right arm, Patient Position: Sitting, Cuff Size: Adult Regular)   Pulse 61   Temp 97.8  F (36.6  C) (Oral)   Resp 16   Ht 1.651 m (5' 5\")   Wt 56.1 kg (123 lb 11.2 oz)   SpO2 97%   BMI 20.58 kg/m      Physical Exam    GENERAL APPEARANCE: healthy, alert and no distress     EYES: EOMI, PERRL     HENT: ear canals and TM's normal and nose and mouth without ulcers or lesions     NECK: no adenopathy, no asymmetry, masses, or scars and thyroid normal to palpation     RESP: lungs clear to auscultation - no rales, rhonchi or wheezes     CV: regular rates and rhythm, normal S1 S2, no S3 or S4 and no murmur, click or rub     ABDOMEN:  soft, nontender, no HSM or masses and bowel sounds normal     MS: extremities normal- no gross deformities noted, no evidence of inflammation in joints, FROM in all extremities.     SKIN: no suspicious lesions or " rashes     NEURO: Normal strength and tone, sensory exam grossly normal, mentation intact and speech normal     PSYCH: mentation appears normal. and affect normal/bright     LYMPHATICS: No cervical adenopathy    Recent Labs   Lab Test 01/24/23  1142   HGB 13.2           Diagnostics:  No labs were ordered during this visit.   No EKG required, no history of coronary heart disease, significant arrhythmia, peripheral arterial disease or other structural heart disease.    Revised Cardiac Risk Index (RCRI):  The patient has the following serious cardiovascular risks for perioperative complications:   - No serious cardiac risks = 0 points     RCRI Interpretation: 0 points: Class I (very low risk - 0.4% complication rate)     Signed Electronically by: Godwin Valentino NP  Copy of this evaluation report is provided to requesting physician.

## 2023-03-15 NOTE — PROGRESS NOTES
[facility]  [address] [facility] [address]  [place] [facility] [address]  Phone: [phone]  Fax: [phone]  Primary Provider: Scarlet Zuniga  Pre-op Performing Provider: SEAN ARTIS      PREOPERATIVE EVALUATION:  Today's date: 3/16/2023    Clarisse Dubon is a 67 year old female who presents for a preoperative evaluation.    Surgical Information:  Surgery/Procedure: EXCISION, GANGLION CYST, left foot  Surgery Location: [facility]  Surgeon: Trenton Acevedo DPM  Surgery Date: 3/20/2023  Time of Surgery: 1:30 PM  Where patient plans to recover: At home with family  Fax number for surgical facility: Note does not need to be faxed, will be available electronically in Epic.    Type of Anesthesia Anticipated: General    Assessment & Plan     The proposed surgical procedure is considered INTERMEDIATE risk.    Preop general physical exam  Medically optimized for surgery  - EKG 12-lead, tracing only    Ganglion cyst of left foot  Planned surgical correction    Essential Hypercholesterolemia  Not on statin therapy.    Status post ablation of atrial fibrillation  No evidence of recurrence             Risks and Recommendations:  The patient has the following additional risks and recommendations for perioperative complications:   - No identified additional risk factors other than previously addressed    Medication Instructions:  Hold NSAIDs and supplements 1 week prior    RECOMMENDATION:  APPROVAL GIVEN to proceed with proposed procedure, without further diagnostic evaluation.      Reviewed family medicine note x1, podiatry note x1, lipids x1, CBC x1, ECG x1    Subjective     HPI related to upcoming procedure: Patient has been dealing with a growing mass along her foot.  Diagnosed with ganglion cyst and surgical correction was recommended.    Preop Questions 3/13/2023   1. Have you ever had a heart attack or stroke? No   2. Have you ever had surgery on  your heart or blood vessels, such as a stent placement, a coronary artery bypass, or surgery on an artery in your head, neck, heart, or legs? YES - cardiac ablation   3. Do you have chest pain with activity? No   4. Do you have a history of  heart failure? No   5. Do you currently have a cold, bronchitis or symptoms of other infection? No   6. Do you have a cough, shortness of breath, or wheezing? No   7. Do you or anyone in your family have previous history of blood clots? No   8. Do you or does anyone in your family have a serious bleeding problem such as prolonged bleeding following surgeries or cuts? No   9. Have you ever had problems with anemia or been told to take iron pills? YES - many years ago related to menses and pregnancy.   10. Have you had any abnormal blood loss such as black, tarry or bloody stools, or abnormal vaginal bleeding? No   11. Have you ever had a blood transfusion? No   12. Are you willing to have a blood transfusion if it is medically needed before, during, or after your surgery? Yes   13. Have you or any of your relatives ever had problems with anesthesia? No   14. Do you have sleep apnea, excessive snoring or daytime drowsiness? No   15. Do you have any artifical heart valves or other implanted medical devices like a pacemaker, defibrillator, or continuous glucose monitor? No   16. Do you have artificial joints? No   17. Are you allergic to latex? No       Health Care Directive:  Patient does not have a Health Care Directive or Living Will: Discussed advance care planning with patient; information given to patient to review.    Preoperative Review of :   reviewed - no record of controlled substances prescribed.    Status of Chronic Conditions:  See problem list for active medical problems.  Problems all longstanding and stable, except as noted/documented.  See ROS for pertinent symptoms related to these conditions.      Review of Systems  CONSTITUTIONAL: NEGATIVE for fever,  chills, change in weight  INTEGUMENTARY/SKIN: NEGATIVE for worrisome rashes, moles or lesions  EYES: NEGATIVE for vision changes or irritation  ENT/MOUTH: NEGATIVE for ear, mouth and throat problems  RESP: NEGATIVE for significant cough or SOB  CV: NEGATIVE for chest pain, palpitations or peripheral edema  GI: NEGATIVE for nausea, abdominal pain, heartburn, or change in bowel habits  : NEGATIVE for frequency, dysuria, or hematuria  MUSCULOSKELETAL: NEGATIVE for significant arthralgias or myalgia  NEURO: NEGATIVE for weakness, dizziness or paresthesias  ENDOCRINE: NEGATIVE for temperature intolerance, skin/hair changes  HEME: NEGATIVE for bleeding problems  PSYCHIATRIC: NEGATIVE for changes in mood or affect    Patient Active Problem List    Diagnosis Date Noted     Ganglion cyst of left foot 03/08/2023     Priority: Medium     Added automatically from request for surgery [id number]       Mass of foot, left 02/21/2023     Priority: Medium     Muscle strain 10/03/2018     Priority: Medium     Frequent urination 06/13/2018     Priority: Medium     Yeast infection of the vagina 06/13/2018     Priority: Medium     Cellulitis 06/03/2018     Priority: Medium     Facial cellulitis 06/03/2018     Priority: Medium     Contact dermatitis and eczema due to plant 06/03/2018     Priority: Medium     Herpes Simplex Type I      Priority: Medium     In genital area May 2000 following oral sex with her  who had a   cold sore.   First recurrence 2/21/2013.  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       Osteopenia      Priority: Medium     Created by Conversion  Replacement  Utility updated for latest IMO load         Vitamin D Deficiency      Priority: Medium     Created by Conversion  Replacement Utility updated for latest IMO load         High risk medication use 05/19/2016     Priority: Medium     Bradycardia by electrocardiogram      Priority: Medium     Created by Conversion         Foot pain, left 09/10/2015     Priority: Medium     Status post ablation of atrial fibrillation 12/17/2014     Priority: Medium     PVI December 17, 2014 (cryo--PVI + RA-CTI line)         Essential Hypercholesterolemia      Priority: Medium     Dx July 1999          Past Medical History:   Diagnosis Date     Arthritis      Atrial fibrillation (H)     Diagnosed 1/2/14.  CHADS2 - VASc score = 1 (gender) ASA Rx Sotalol (bradycardia) Flecainide pre-ablation PVI Dec 2014        Cellulitis of ankle     Right     Cervical dysplasia      Contact dermatitis and other eczema due to plants (except food)     Poison Ivy     Edema     due to arthritis     Herpes simplex     type 1     History of transfusion      Hyperlipidemia      Insufficiency fracture of tibia 11/20/2015    Right ankle insufficiency fracture     Motion sickness      PONV (postoperative nausea and vomiting)      Rheumatoid arthritis (H)      Rheumatoid arthritis (H)      Sinus bradycardia      Ureteral stone     Left     Vitamin D deficiency      Past Surgical History:   Procedure Laterality Date     BIOPSY BREAST Right 09/26/2007    Biopsy Breast Percutaneous Needle Core 7;  Comments: Benign results     CARDIAC ELECTROPHYSIOLOGY MAPPING AND ABLATION  12/17/14    Pulmonary vein isolation for AF     CERVIX LESION DESTRUCTION  June 1990    for ARAM I     DILATION AND CURETTAGE      for menorrhagia, Hgb 5.7     HYSTERECTOMY VAGINAL   07/25/2007    With A/P repair and enterocele repair for benign reasons     LAPAROSCOPIC HYSTERECTOMY TOTAL       Current Outpatient Medications   Medication Sig Dispense Refill     calcium citrate-vitamin D (CITRACAL+D)  "315-200 mg-unit per tablet [CALCIUM CITRATE-VITAMIN D (CITRACAL+D) 315-200 MG-UNIT PER TABLET] Take 1 tablet by mouth 2 (two) times a day.       cholecalciferol, vitamin D3, 1,000 unit tablet [CHOLECALCIFEROL, VITAMIN D3, 1,000 UNIT TABLET] Take 1,000 Units by mouth daily.       multivit-min/ferrous fumarate (MULTI VITAMIN ORAL) [MULTIVIT-MIN/FERROUS FUMARATE (MULTI VITAMIN ORAL)] Take by mouth daily.         No Known Allergies     Social History     Tobacco Use     Smoking status: Never     Smokeless tobacco: Never   Substance Use Topics     Alcohol use: Not Currently     Family History   Problem Relation Age of Onset     Heart Disease Mother      Diabetes Type 2  Mother      Hypertension Mother      Coronary Artery Disease Father      Heart Disease Father      Diabetes Type 2  Father      Hypertension Father      Atrial fibrillation Sister      Hypertension Brother      Coronary Artery Disease Brother      Leukemia Brother      Heart Disease Brother      Cerebrovascular Disease Sister      Lupus Sister      History   Drug Use No         Objective     /80 (BP Location: Right arm, Patient Position: Sitting, Cuff Size: Adult Regular)   Pulse 61   Temp 97.8  F (36.6  C) (Oral)   Resp 16   Ht 1.651 m (5' 5\")   Wt 56.1 kg (123 lb 11.2 oz)   SpO2 97%   BMI 20.58 kg/m      Physical Exam    GENERAL APPEARANCE: healthy, alert and no distress     EYES: EOMI, PERRL     HENT: ear canals and TM's normal and nose and mouth without ulcers or lesions     NECK: no adenopathy, no asymmetry, masses, or scars and thyroid normal to palpation     RESP: lungs clear to auscultation - no rales, rhonchi or wheezes     CV: regular rates and rhythm, normal S1 S2, no S3 or S4 and no murmur, click or rub     ABDOMEN:  soft, nontender, no HSM or masses and bowel sounds normal     MS: extremities normal- no gross deformities noted, no evidence of inflammation in joints, FROM in all extremities.     SKIN: no suspicious lesions or " rashes     NEURO: Normal strength and tone, sensory exam grossly normal, mentation intact and speech normal     PSYCH: mentation appears normal. and affect normal/bright     LYMPHATICS: No cervical adenopathy    Recent Labs   Lab Test 01/24/23  1142   HGB 13.2           Diagnostics:  No labs were ordered during this visit.   No EKG required, no history of coronary heart disease, significant arrhythmia, peripheral arterial disease or other structural heart disease.    Revised Cardiac Risk Index (RCRI):  The patient has the following serious cardiovascular risks for perioperative complications:   - No serious cardiac risks = 0 points     RCRI Interpretation: 0 points: Class I (very low risk - 0.4% complication rate)     Signed Electronically by: Godwin Valentino NP  Copy of this evaluation report is provided to requesting physician.

## 2023-03-16 ENCOUNTER — OFFICE VISIT (OUTPATIENT)
Dept: FAMILY MEDICINE | Facility: CLINIC | Age: 68
End: 2023-03-16
Payer: COMMERCIAL

## 2023-03-16 VITALS
WEIGHT: 123.7 LBS | DIASTOLIC BLOOD PRESSURE: 80 MMHG | OXYGEN SATURATION: 97 % | SYSTOLIC BLOOD PRESSURE: 118 MMHG | RESPIRATION RATE: 16 BRPM | HEIGHT: 65 IN | HEART RATE: 61 BPM | TEMPERATURE: 97.8 F | BODY MASS INDEX: 20.61 KG/M2

## 2023-03-16 DIAGNOSIS — Z01.818 PREOP GENERAL PHYSICAL EXAM: Primary | ICD-10-CM

## 2023-03-16 DIAGNOSIS — M67.472 GANGLION CYST OF LEFT FOOT: ICD-10-CM

## 2023-03-16 DIAGNOSIS — E78.00 PURE HYPERCHOLESTEROLEMIA: ICD-10-CM

## 2023-03-16 DIAGNOSIS — Z86.79 STATUS POST ABLATION OF ATRIAL FIBRILLATION: ICD-10-CM

## 2023-03-16 DIAGNOSIS — Z98.890 STATUS POST ABLATION OF ATRIAL FIBRILLATION: ICD-10-CM

## 2023-03-16 PROBLEM — L03.90 CELLULITIS: Status: RESOLVED | Noted: 2018-06-03 | Resolved: 2023-03-16

## 2023-03-16 LAB
ATRIAL RATE - MUSE: 52 BPM
DIASTOLIC BLOOD PRESSURE - MUSE: NORMAL MMHG
INTERPRETATION ECG - MUSE: NORMAL
P AXIS - MUSE: 65 DEGREES
PR INTERVAL - MUSE: 152 MS
QRS DURATION - MUSE: 88 MS
QT - MUSE: 478 MS
QTC - MUSE: 444 MS
R AXIS - MUSE: 29 DEGREES
SYSTOLIC BLOOD PRESSURE - MUSE: NORMAL MMHG
T AXIS - MUSE: 36 DEGREES
VENTRICULAR RATE- MUSE: 52 BPM

## 2023-03-16 PROCEDURE — 99214 OFFICE O/P EST MOD 30 MIN: CPT | Performed by: NURSE PRACTITIONER

## 2023-03-16 PROCEDURE — 93005 ELECTROCARDIOGRAM TRACING: CPT | Performed by: NURSE PRACTITIONER

## 2023-03-16 PROCEDURE — 93010 ELECTROCARDIOGRAM REPORT: CPT | Performed by: GENERAL ACUTE CARE HOSPITAL

## 2023-03-16 ASSESSMENT — PAIN SCALES - GENERAL: PAINLEVEL: NO PAIN (0)

## 2023-03-17 ENCOUNTER — ANESTHESIA EVENT (OUTPATIENT)
Dept: SURGERY | Facility: AMBULATORY SURGERY CENTER | Age: 68
End: 2023-03-17
Payer: COMMERCIAL

## 2023-03-20 ENCOUNTER — ANESTHESIA (OUTPATIENT)
Dept: SURGERY | Facility: AMBULATORY SURGERY CENTER | Age: 68
End: 2023-03-20
Payer: COMMERCIAL

## 2023-03-20 ENCOUNTER — HOSPITAL ENCOUNTER (OUTPATIENT)
Facility: AMBULATORY SURGERY CENTER | Age: 68
Discharge: HOME | End: 2023-03-20
Attending: PODIATRIST
Payer: COMMERCIAL

## 2023-03-20 VITALS
SYSTOLIC BLOOD PRESSURE: 118 MMHG | HEART RATE: 55 BPM | OXYGEN SATURATION: 99 % | HEIGHT: 66 IN | BODY MASS INDEX: 19.61 KG/M2 | DIASTOLIC BLOOD PRESSURE: 61 MMHG | WEIGHT: 122 LBS | TEMPERATURE: 96.8 F | RESPIRATION RATE: 16 BRPM

## 2023-03-20 DIAGNOSIS — M67.472 GANGLION CYST OF LEFT FOOT: ICD-10-CM

## 2023-03-20 DIAGNOSIS — R22.42 MASS OF FOOT, LEFT: Primary | ICD-10-CM

## 2023-03-20 PROCEDURE — 28090 REMOVAL OF FOOT LESION: CPT | Mod: LT | Performed by: PODIATRIST

## 2023-03-20 RX ORDER — GLYCOPYRROLATE 0.2 MG/ML
INJECTION, SOLUTION INTRAMUSCULAR; INTRAVENOUS PRN
Status: DISCONTINUED | OUTPATIENT
Start: 2023-03-20 | End: 2023-03-20

## 2023-03-20 RX ORDER — CEFAZOLIN SODIUM 2 G/100ML
2 INJECTION, SOLUTION INTRAVENOUS
Status: COMPLETED | OUTPATIENT
Start: 2023-03-20 | End: 2023-03-20

## 2023-03-20 RX ORDER — FENTANYL CITRATE 0.05 MG/ML
25-100 INJECTION, SOLUTION INTRAMUSCULAR; INTRAVENOUS
Status: DISCONTINUED | OUTPATIENT
Start: 2023-03-20 | End: 2023-03-21 | Stop reason: HOSPADM

## 2023-03-20 RX ORDER — LIDOCAINE 40 MG/G
CREAM TOPICAL
Status: DISCONTINUED | OUTPATIENT
Start: 2023-03-20 | End: 2023-03-21 | Stop reason: HOSPADM

## 2023-03-20 RX ORDER — BUPIVACAINE HYDROCHLORIDE 5 MG/ML
INJECTION, SOLUTION EPIDURAL; INTRACAUDAL
Status: COMPLETED | OUTPATIENT
Start: 2023-03-20 | End: 2023-03-20

## 2023-03-20 RX ORDER — ONDANSETRON 2 MG/ML
INJECTION INTRAMUSCULAR; INTRAVENOUS PRN
Status: DISCONTINUED | OUTPATIENT
Start: 2023-03-20 | End: 2023-03-20

## 2023-03-20 RX ORDER — ACETAMINOPHEN 325 MG/1
650 TABLET ORAL
Status: DISCONTINUED | OUTPATIENT
Start: 2023-03-20 | End: 2023-03-21 | Stop reason: HOSPADM

## 2023-03-20 RX ORDER — PROPOFOL 10 MG/ML
INJECTION, EMULSION INTRAVENOUS CONTINUOUS PRN
Status: DISCONTINUED | OUTPATIENT
Start: 2023-03-20 | End: 2023-03-20

## 2023-03-20 RX ORDER — PROPOFOL 10 MG/ML
INJECTION, EMULSION INTRAVENOUS PRN
Status: DISCONTINUED | OUTPATIENT
Start: 2023-03-20 | End: 2023-03-20

## 2023-03-20 RX ORDER — ACETAMINOPHEN 325 MG/1
975 TABLET ORAL ONCE
Status: COMPLETED | OUTPATIENT
Start: 2023-03-20 | End: 2023-03-20

## 2023-03-20 RX ORDER — CEFAZOLIN SODIUM 2 G/100ML
2 INJECTION, SOLUTION INTRAVENOUS SEE ADMIN INSTRUCTIONS
Status: DISCONTINUED | OUTPATIENT
Start: 2023-03-20 | End: 2023-03-21 | Stop reason: HOSPADM

## 2023-03-20 RX ORDER — OXYCODONE HYDROCHLORIDE 5 MG/1
5-10 TABLET ORAL EVERY 6 HOURS PRN
Qty: 16 TABLET | Refills: 0 | Status: SHIPPED | OUTPATIENT
Start: 2023-03-20 | End: 2023-11-21

## 2023-03-20 RX ORDER — OXYCODONE HYDROCHLORIDE 10 MG/1
10 TABLET ORAL
Status: DISCONTINUED | OUTPATIENT
Start: 2023-03-20 | End: 2023-03-21 | Stop reason: HOSPADM

## 2023-03-20 RX ORDER — SODIUM CHLORIDE, SODIUM LACTATE, POTASSIUM CHLORIDE, CALCIUM CHLORIDE 600; 310; 30; 20 MG/100ML; MG/100ML; MG/100ML; MG/100ML
INJECTION, SOLUTION INTRAVENOUS CONTINUOUS
Status: DISCONTINUED | OUTPATIENT
Start: 2023-03-20 | End: 2023-03-21 | Stop reason: HOSPADM

## 2023-03-20 RX ORDER — OXYCODONE HYDROCHLORIDE 5 MG/1
5 TABLET ORAL
Status: DISCONTINUED | OUTPATIENT
Start: 2023-03-20 | End: 2023-03-21 | Stop reason: HOSPADM

## 2023-03-20 RX ADMIN — BUPIVACAINE HYDROCHLORIDE 20 ML: 5 INJECTION, SOLUTION EPIDURAL; INTRACAUDAL at 13:17

## 2023-03-20 RX ADMIN — PROPOFOL 20 MG: 10 INJECTION, EMULSION INTRAVENOUS at 13:59

## 2023-03-20 RX ADMIN — FENTANYL CITRATE 50 MCG: 0.05 INJECTION, SOLUTION INTRAMUSCULAR; INTRAVENOUS at 13:13

## 2023-03-20 RX ADMIN — SODIUM CHLORIDE, SODIUM LACTATE, POTASSIUM CHLORIDE, CALCIUM CHLORIDE: 600; 310; 30; 20 INJECTION, SOLUTION INTRAVENOUS at 12:49

## 2023-03-20 RX ADMIN — ONDANSETRON 4 MG: 2 INJECTION INTRAMUSCULAR; INTRAVENOUS at 14:10

## 2023-03-20 RX ADMIN — CEFAZOLIN SODIUM 2 G: 2 INJECTION, SOLUTION INTRAVENOUS at 13:46

## 2023-03-20 RX ADMIN — ACETAMINOPHEN 975 MG: 325 TABLET ORAL at 12:39

## 2023-03-20 RX ADMIN — BUPIVACAINE HYDROCHLORIDE 10 ML: 5 INJECTION, SOLUTION EPIDURAL; INTRACAUDAL at 13:17

## 2023-03-20 RX ADMIN — PROPOFOL 120 MCG/KG/MIN: 10 INJECTION, EMULSION INTRAVENOUS at 13:49

## 2023-03-20 RX ADMIN — GLYCOPYRROLATE 0.2 MG: 0.2 INJECTION, SOLUTION INTRAMUSCULAR; INTRAVENOUS at 13:54

## 2023-03-20 ASSESSMENT — ENCOUNTER SYMPTOMS
DYSRHYTHMIAS: 1
SEIZURES: 0

## 2023-03-20 NOTE — PROGRESS NOTES
Timeout performed: Yes    Time timeout performed: 1:10 PM    Time procedure began:  1313 pm    Block Assessment/Safety: (prior to administration of Versed and Fentanyl)    Nerve block pamphlet: not given and given    Safety instructions discussed and reviewed: Yes    Vitals and RASS score pre-procedure and every 5 minutes throughout procedure until block completed: All vitals have been reviewed  -    Cardiac Rhythm: Normal sinus  and Sinus bradycardia    Pain scale used: 0-10 Numeric Pain Rating Scale, with 10 being the worst pain imaginable    Pain prior to procedure: 0    Pain during procedure: 0/10    Pain post-procedure: 0/10    Time procedure ended: 1317 pm    Patient observed 15 minutes post-procedure: Yes    Signs of local toxicity Signs/Symptoms: No  (CNS symptoms (may not occur): clif-oral numbness/tingling, tinnitus, metallic taste, seizures  CV collapse: rhythm disturbance, hypotension, bradycardia, altered consciousness)

## 2023-03-20 NOTE — ANESTHESIA PROCEDURE NOTES
"Adductor canal Procedure Note    Pre-Procedure   Staff -        Anesthesiologist:  Eladio Cameron MD       Performed By: anesthesiologist       Location: pre-op       Procedure Start/Stop Times: 3/20/2023 1:17 PM and 3/20/2023 1:17 PM       Pre-Anesthestic Checklist: patient identified, IV checked, site marked, risks and benefits discussed, informed consent, monitors and equipment checked, pre-op evaluation, at physician/surgeon's request and post-op pain management  Timeout:       Correct Patient: Yes        Correct Procedure: Yes        Correct Site: Yes        Correct Position: Yes        Correct Laterality: Yes        Site Marked: Yes  Procedure Documentation  Procedure: Adductor canal       Laterality: left       Patient Position: supine       Skin prep: Chloraprep       Local skin infiltrated with mL of 1% lidocaine.        Needle Type: short bevel       Needle Gauge: 21.        Needle Length (Inches): 4        Ultrasound guided       1. Ultrasound was used to identify targeted nerve, plexus, vascular marker, or fascial plane and place a needle adjacent to it in real-time.       2. Ultrasound was used to visualize the spread of anesthetic in close proximity to the above referenced structure.       3. A permanent image is entered into the patient's record.    Assessment/Narrative         The placement was negative for: blood aspirated, painful injection and site bleeding       Paresthesias: No.       Bolus given via needle..        Secured via.        Insertion/Infusion Method: Single Shot       Complications: none    Medication(s) Administered   Bupivacaine 0.5% PF (Infiltration) - Infiltration   10 mL - 3/20/2023 1:17:00 PM  Medication Administration Time: 3/20/2023 1:17 PM      FOR [facility] ([facility]) ONLY:   Pain Team Contact information: please page the Pain Team Via Skift. Search \"Pain\". During daytime hours, please page the attending first. At night please page the resident first.    "

## 2023-03-20 NOTE — ANESTHESIA PREPROCEDURE EVALUATION
Anesthesia Pre-Procedure Evaluation    Patient: Clarisse Dubon   MRN: [id number] : 1955        Procedure : Procedure(s):  EXCISION, GANGLION CYST, left foot          Past Medical History:   Diagnosis Date     Arthritis      Atrial fibrillation (H)     Diagnosed 14.  CHADS2 - VASc score = 1 (gender) ASA Rx Sotalol (bradycardia) Flecainide pre-ablation PVI Dec 2014        Cellulitis of ankle     Right     Cervical dysplasia      Contact dermatitis and other eczema due to plants (except food)     Poison Ivy     Edema     due to arthritis     Herpes simplex     type 1     History of transfusion      Hyperlipidemia      Insufficiency fracture of tibia 2015    Right ankle insufficiency fracture     Motion sickness      PONV (postoperative nausea and vomiting)      Rheumatoid arthritis (H)      Rheumatoid arthritis (H)      Sinus bradycardia      Ureteral stone     Left     Vitamin D deficiency       Past Surgical History:   Procedure Laterality Date     BIOPSY BREAST Right 2007    Biopsy Breast Percutaneous Needle Core 7;  Comments: Benign results     CARDIAC ELECTROPHYSIOLOGY MAPPING AND ABLATION  15    Pulmonary vein isolation for AF     CERVIX LESION DESTRUCTION  1990    for ARAM I     DILATION AND CURETTAGE      for menorrhagia, Hgb 5.7     HYSTERECTOMY VAGINAL   2007    With A/P repair and enterocele repair for benign reasons     LAPAROSCOPIC HYSTERECTOMY TOTAL        No Known Allergies   Social History     Tobacco Use     Smoking status: Never     Smokeless tobacco: Never   Substance Use Topics     Alcohol use: Not Currently      Wt Readings from Last 1 Encounters:   23 56.1 kg (123 lb 11.2 oz)        Anesthesia Evaluation            ROS/MED HX  ENT/Pulmonary:  - neg pulmonary ROS  (-) sleep apnea   Neurologic:  - neg neurologic ROS  (-) no seizures and no CVA   Cardiovascular: Comment: Afib s/p ablation    (+) -----dysrhythmias, a-fib,     METS/Exercise Tolerance:      Hematologic:  - neg hematologic  ROS     Musculoskeletal:   (+) arthritis,     GI/Hepatic:  - neg GI/hepatic ROS     Renal/Genitourinary:  - neg Renal ROS     Endo:  - neg endo ROS     Psychiatric/Substance Use:  - neg psychiatric ROS     Infectious Disease:  - neg infectious disease ROS     Malignancy:       Other:            Physical Exam    Airway  airway exam normal      Mallampati: II   TM distance: > 3 FB   Neck ROM: full   Mouth opening: > 3 cm    Respiratory Devices and Support         Dental           Cardiovascular   cardiovascular exam normal       Rhythm and rate: regular and normal     Pulmonary   pulmonary exam normal        breath sounds clear to auscultation           OUTSIDE LABS:  CBC:   Lab Results   Component Value Date    WBC 2.7 (L) 01/24/2023    WBC 5.5 11/07/2020    HGB 13.2 01/24/2023    HGB 12.7 11/07/2020    HCT 41.2 01/24/2023    HCT 38.3 11/07/2020     01/24/2023     11/07/2020     BMP:   Lab Results   Component Value Date     11/07/2020     06/03/2018    POTASSIUM 4.2 11/07/2020    POTASSIUM 3.9 06/03/2018    CHLORIDE 106 11/07/2020    CHLORIDE 109 (H) 06/03/2018    CO2 20 (L) 11/07/2020    CO2 22 06/03/2018    BUN 16 11/07/2020    BUN 18 06/03/2018    CR 0.77 11/07/2020    CR 0.85 11/15/2018     11/07/2020    GLC 90 06/03/2018     COAGS: No results found for: PTT, INR, FIBR  POC: No results found for: BGM, HCG, HCGS  HEPATIC:   Lab Results   Component Value Date    ALBUMIN 4.0 11/07/2020    PROTTOTAL 7.0 11/07/2020    ALT 27 11/07/2020    AST 28 11/07/2020    ALKPHOS 87 11/07/2020    BILITOTAL 0.3 11/07/2020     OTHER:   Lab Results   Component Value Date    GWEN 8.9 11/07/2020    TSH 1.52 06/07/2018    CRP <0.1 11/07/2020    SED 7 01/24/2023       Anesthesia Plan    ASA Status:  2      Anesthesia Type: General.     - Airway: Mask Only              Consents    Anesthesia Plan(s) and associated risks, benefits, and realistic alternatives discussed.  Questions answered and patient/representative(s) expressed understanding.    - Discussed:     - Discussed with:  Patient         Postoperative Care    Pain management: Peripheral nerve block (Single Shot).   PONV prophylaxis: Ondansetron (or other 5HT-3)     Comments:                Eladio Cameron MD

## 2023-03-20 NOTE — DISCHARGE INSTRUCTIONS
You have received 975 mg of Acetaminophen (Tylenol) at 12:39 pm. Please do not take an additional dose of Tylenol until after 6:39 pm.     Do not exceed 4,000 mg of acetaminophen during a 24 hour period and keep in mind that acetaminophen can also be found in many over-the-counter cold medications as well as narcotics that may be given for pain.     If you have any questions or concerns regarding your procedure, please contact Dr. Acevedo, his office number is [phone].        Leg, Knee and Foot blocks:  Do not attempt to walk or bear weight on your leg until all numbness has disappeared and full muscle strength has returned.  Protect yourself from falls!  Follow your surgeon's instructions about when you can begin to walk and put any weight on your leg or foot.   Do not stand or walk without assistance or your crutches if your foot or leg still feels numb, heavy or weak.      For All Types of Blocks:  You arm/leg will be weak, numb and difficult for your brain to locate.  It may feel heavy or absent.  This is normal.   Your hand/foot may feel warmer than usual after a nerve block.  This is also normal and will go away as the block wears off.  Depending on the medication administered, your block may last up to 24 hours.  Follow your surgeon's instructions for when you can move your arm or leg, take pain medications, use cold packs and care of your surgical site.      Contact your Surgeon for:  Inadequate pain control after taking your oral pain medications.  Nausea or vomiting at home.  Bleeding or any other concerns about the incision.   Cold and/or discolored fingers or toes.    If there are concerns related to the block you received, contact your Surgeon. They will then contact the Anesthesia team.   If the hematoma (bruise) at the site of the nerve block seems to be getting bigger.  If the site where the block was performed is red, swollen, draining or hot to touch.  If you develop a painful sensation down  your arm or leg.   Any concerns about your anesthetic or the nerve block.     For 24 hours after surgery    Get plenty of rest.  A responsible adult must stay with you for at least 24 hours after you leave the hospital.   Do not drive or use heavy equipment.  If you have weakness or tingling, don't drive or use heavy equipment until this feeling goes away.  Do not drink alcohol.  Avoid strenuous or risky activities.  Ask for help when climbing stairs.   You may feel lightheaded.  IF so, sit for a few minutes before standing.  Have someone help you get up.   If you have nausea (feel sick to your stomach): Drink only clear liquids such as apple juice, ginger ale, broth or 7-Up.  Rest may also help.  Be sure to drink enough fluids.  Move to a regular diet as you feel able.  You may have a slight fever. Call the doctor if your fever is over 100 F (37.7 C) (taken under the tongue) or lasts longer than 24 hours.  You may have a dry mouth, a sore throat, muscle aches or trouble sleeping.  These should go away after 24 hours.  Do not make important or legal decisions.   Call your doctor for any of the followin.  Signs of infection (fever, growing tenderness at the surgery site, a large amount of drainage or bleeding, severe pain, foul-smelling drainage, redness, swelling).    2. It has been over 8 to 10 hours since surgery and you are still not able to urinate (pass water).    3.  Headache for over 24 hours.    4.  Numbness, tingling or weakness the day after surgery (if you had spinal anesthesia).

## 2023-03-20 NOTE — OP NOTE
Date: 3/20/23    Surgeon: PIERRE Acevedo DPM    Preoperative diagnosis: Ganglion cyst left foot    Postoperative diagnosis: Same    Procedure: Excision ganglion cyst left foot    Anesthesia: Popliteal with IV-sedation    Hemostasis: Pneumatic ankle tourniquet 250 mmHg    Pathology: Soft tissue    Injectables: none    Materials: 3-0 Vicryl, 3-0 nylon    Complications: None    Blood loss: 2 cc      Findings: Patient presents for operative intervention for a cystic lesion on her left foot.  Preoperative MRI indicated likely a cystic lesion between digits 2 and 3 on the left foot.  We discussed today surgical procedure to include removal of the lesion.  Postoperative course to remain nonweightbearing on the left foot at all times, cam boot for stability.  Risks include but not limited to dehiscence of the surgical site, infection, numbness and reoccurrence of the soft tissue mass.  All questions invited and answered.  Patient consents to surgery.  Conservative measures were attempted and exhausted. Patient questions invited and answered, including appropriate risk, benefits and complications. No guarantees given or implied. Patient has been NPO.    Description: Patient was brought to the operating room and placed on the table in supine position. IV-sedation and popliteal block was administered by the anesthesia department. The foot was then prepped and draped in usual aseptic manner. The extremity was elevated and exsanguinated. Well-padded ankle pneumatic tourniquet was inflated to 250mmHg and the following procedure was then performed: Attention was directed to the dorsal aspect of the left foot where a 3 cm incision was made with a #15 blade between second and third metatarsals at the level of the MPJs at the level of the soft tissue mass.  The incision was deepened to the level of the subcutaneous tissue with care taken to protect all neurovascular structures and cauterize any superficial bleeders.  At this time a large  soft tissue mass was then identified and noted to track distally and plantarly.  A #15 blade was then used to extend the incision between digits 2 and 3 again blunt and sharp dissection was carried into the subcutaneous tissue with care taken to protect all neurovascular structures and cauterize any superficial bleeders.  Blunt and sharp dissection was then used to carefully release the soft tissue mass from the surrounding soft tissue.  At this time it was noted that the soft tissue mass did appear to involve the intermetatarsal nerve which was partially and/or fully resected at this level.  Once the mass had been released from the surrounding soft tissue it was sent to pathology.  The mass measured 5 x 2-1/2 cm.  Surgical site was then irrigated with a 1000 cc pulse lavage.  All bleeding vessels were then cauterized.  No remaining cystic type tissue was identified.  The subcutaneous tissue was reapproximated with 3-0 vicryl in a simple suture fashion and the skin was closed with 3-0 nylon in a simple suture fashion.     Dressings consisted of adaptic, 4x4's, marlin roll and an ace wrap. The pneumatic tourniquet was released and a hyperemic response was noted to the digits on the left foot.     The patient appeared to tolerate all the procedures and anesthesia well without apparent complications. Patient was transported from the operating room to the recovery room with vital signs stable and neurovascular status as it was pre-operatively to the left foot. Patient to be discharged home per anesthesia. Written and verbal homecare instructions given to remain nonweightbearing on the left foot, cam boot at all times with floating of the left heel.  Elevation of the left foot above her waist at all times.  Patient to follow up in office for post-operative management in 1 week.

## 2023-03-20 NOTE — ANESTHESIA CARE TRANSFER NOTE
Patient: Clarisse Dubon    Procedure: Procedure(s):  EXCISION, GANGLION CYST, left foot       Diagnosis: Ganglion cyst of left foot [M67.472]  Diagnosis Additional Information: No value filed.    Anesthesia Type:   General     Note:    Oropharynx: oropharynx clear of all foreign objects and spontaneously breathing  Level of Consciousness: awake and drowsy  Oxygen Supplementation: room air    Independent Airway: airway patency satisfactory and stable  Dentition: dentition unchanged  Vital Signs Stable: post-procedure vital signs reviewed and stable  Report to RN Given: handoff report given  Patient transferred to: Phase II    Handoff Report: Identifed the Patient, Identified the Reponsible Provider, Reviewed the pertinent medical history, Discussed the surgical course, Reviewed Intra-OP anesthesia mangement and issues during anesthesia, Set expectations for post-procedure period and Allowed opportunity for questions and acknowledgement of understanding      Vitals:  Vitals Value Taken Time   /56 03/20/23 1423   Temp 36  C (96.8  F) 03/20/23 1423   Pulse 59 03/20/23 1425   Resp 16 03/20/23 1423   SpO2 99 % 03/20/23 1425   Vitals shown include unvalidated device data.    Electronically Signed By: MALDONADO Ga CRNA  March 20, 2023  2:28 PM

## 2023-03-20 NOTE — ANESTHESIA PROCEDURE NOTES
"Popliteal Procedure Note    Pre-Procedure   Staff -        Anesthesiologist:  Eladio Cameron MD       Performed By: anesthesiologist       Location: pre-op       Procedure Start/Stop Times: 3/20/2023 1:17 PM and 3/20/2023 1:17 PM       Pre-Anesthestic Checklist: patient identified, IV checked, site marked, risks and benefits discussed, informed consent, monitors and equipment checked, pre-op evaluation, at physician/surgeon's request and post-op pain management  Timeout:       Correct Patient: Yes        Correct Procedure: Yes        Correct Site: Yes        Correct Position: Yes        Correct Laterality: Yes        Site Marked: Yes  Procedure Documentation  Procedure: Popliteal       Laterality: left       Patient Position: supine       Skin prep: Chloraprep       Local skin infiltrated with mL of 1% lidocaine.        Needle Type: short bevel       Needle Gauge: 21.        Needle Length (Inches): 4        Ultrasound guided       1. Ultrasound was used to identify targeted nerve, plexus, vascular marker, or fascial plane and place a needle adjacent to it in real-time.       2. Ultrasound was used to visualize the spread of anesthetic in close proximity to the above referenced structure.       3. A permanent image is entered into the patient's record.    Assessment/Narrative         The placement was negative for: blood aspirated, painful injection and site bleeding       Paresthesias: No.       Bolus given via needle..        Secured via.        Insertion/Infusion Method: Single Shot       Complications: none    Medication(s) Administered   Bupivacaine 0.5% PF (Infiltration) - Infiltration   20 mL - 3/20/2023 1:17:00 PM  Medication Administration Time: 3/20/2023 1:17 PM      FOR [facility] ([facility]) ONLY:   Pain Team Contact information: please page the Pain Team Via Experience Head[employer]. Search \"Pain\". During daytime hours, please page the attending first. At night please page the resident first.    "

## 2023-03-29 ENCOUNTER — OFFICE VISIT (OUTPATIENT)
Dept: VASCULAR SURGERY | Facility: CLINIC | Age: 68
End: 2023-03-29
Attending: NURSE PRACTITIONER
Payer: COMMERCIAL

## 2023-03-29 VITALS
TEMPERATURE: 97.7 F | SYSTOLIC BLOOD PRESSURE: 118 MMHG | DIASTOLIC BLOOD PRESSURE: 62 MMHG | RESPIRATION RATE: 18 BRPM | HEART RATE: 18 BPM

## 2023-03-29 DIAGNOSIS — M67.472 GANGLION CYST OF LEFT FOOT: Primary | ICD-10-CM

## 2023-03-29 PROCEDURE — 99024 POSTOP FOLLOW-UP VISIT: CPT | Performed by: PODIATRIST

## 2023-03-29 PROCEDURE — G0463 HOSPITAL OUTPT CLINIC VISIT: HCPCS | Performed by: PODIATRIST

## 2023-03-29 ASSESSMENT — PAIN SCALES - GENERAL: PAINLEVEL: MILD PAIN (2)

## 2023-03-29 NOTE — PROGRESS NOTES
Podiatry Progress Note        ASSESSMENT: S/P excision ganglion cyst left foot      TREATMENT:  -Surgical site on the left foot is progressing well.  Pathology reports indicate the presence of a ganglion cyst.    -Gauze dressing applied today which she will keep intact. Continue non-weight bearing on the she foot.     -She will follow-up with me in 2 weeks for suture removal.     Trenton Acevedo DPM  [facility] Podiatry/Foot & Ankle Surgery      HPI: Clarisse Dubon was seen again today s/p excision ganglion cyst left foot.  Patient is remained nonweightbearing on her left foot with a knee walker using a cam boot for stability.  Denies foot pain at this time.    Past Medical History:   Diagnosis Date     Arthritis      Atrial fibrillation (H)     Diagnosed 1/2/14.  CHADS2 - VASc score = 1 (gender) ASA Rx Sotalol (bradycardia) Flecainide pre-ablation PVI Dec 2014        Cellulitis of ankle     Right     Cervical dysplasia      Contact dermatitis and other eczema due to plants (except food)     Poison Ivy     Edema     due to arthritis     Herpes simplex     type 1     History of transfusion      Hyperlipidemia      Insufficiency fracture of tibia 11/20/2015    Right ankle insufficiency fracture     Motion sickness      PONV (postoperative nausea and vomiting)      Rheumatoid arthritis (H)      Rheumatoid arthritis (H)      Sinus bradycardia      Ureteral stone     Left     Vitamin D deficiency        No Known Allergies      Current Outpatient Medications:      calcium citrate-vitamin D (CITRACAL+D) 315-200 mg-unit per tablet, [CALCIUM CITRATE-VITAMIN D (CITRACAL+D) 315-200 MG-UNIT PER TABLET] Take 1 tablet by mouth 2 (two) times a day., Disp: , Rfl:      cholecalciferol, vitamin D3, 1,000 unit tablet, [CHOLECALCIFEROL, VITAMIN D3, 1,000 UNIT TABLET] Take 1,000 Units by mouth daily., Disp: , Rfl:      multivit-min/ferrous fumarate (MULTI VITAMIN ORAL), [MULTIVIT-MIN/FERROUS FUMARATE (MULTI VITAMIN ORAL)] Take by  mouth daily., Disp: , Rfl:      oxyCODONE (ROXICODONE) 5 MG tablet, Take 1-2 tablets (5-10 mg) by mouth every 6 hours as needed for moderate to severe pain, Disp: 16 tablet, Rfl: 0    Review of Systems - Negative       OBJECTIVE:  Appearance: alert, well appearing, and in no distress.    /62   Pulse (!) 18   Temp 97.7  F (36.5  C)   Resp 18     @LDAVASC(10,16,17)@         Surgical site on the dorsal left forefoot has intact sutures with skin edges well approximated, no gapping noted. No erythema left foot. Neurovascular status unchanged left foot.

## 2023-04-12 ENCOUNTER — OFFICE VISIT (OUTPATIENT)
Dept: VASCULAR SURGERY | Facility: CLINIC | Age: 68
End: 2023-04-12
Attending: PODIATRIST
Payer: COMMERCIAL

## 2023-04-12 VITALS
DIASTOLIC BLOOD PRESSURE: 70 MMHG | SYSTOLIC BLOOD PRESSURE: 110 MMHG | HEIGHT: 66 IN | RESPIRATION RATE: 16 BRPM | BODY MASS INDEX: 19.61 KG/M2 | WEIGHT: 122 LBS | HEART RATE: 55 BPM | OXYGEN SATURATION: 95 %

## 2023-04-12 DIAGNOSIS — M67.472 GANGLION CYST OF LEFT FOOT: Primary | ICD-10-CM

## 2023-04-12 PROCEDURE — 99024 POSTOP FOLLOW-UP VISIT: CPT | Performed by: PODIATRIST

## 2023-04-12 PROCEDURE — G0463 HOSPITAL OUTPT CLINIC VISIT: HCPCS | Performed by: PODIATRIST

## 2023-04-12 ASSESSMENT — PAIN SCALES - GENERAL: PAINLEVEL: NO PAIN (0)

## 2023-04-12 NOTE — PROGRESS NOTES
Podiatry Progress Note        ASSESSMENT: S/P excision ganglion cyst left foot      TREATMENT:  -Surgical site on the left foot is progressing well.  Sutures removed today, Steri-Strips applied.    -Gauze dressing applied today which she will keep intact until tomorrow.  I recommend she stay limited weightbearing in the cam boot x3 weeks then return to regular shoes.  I have asked her to monitor for any gapping along the incision and to contact my office should this occur.    -She is discharged from my care at this time but encouraged to follow-up with any problems questions or concerns as they develop.    Trenton Acevedo DPM  [facility] Podiatry/Foot & Ankle Surgery      HPI: Clarisse Dubon was seen again today s/p excision ganglion cyst left foot.  Patient is remained nonweightbearing on her left foot with a knee walker using a cam boot for stability.  Denies foot pain at this time.    Past Medical History:   Diagnosis Date     Arthritis      Atrial fibrillation (H)     Diagnosed 1/2/14.  CHADS2 - VASc score = 1 (gender) ASA Rx Sotalol (bradycardia) Flecainide pre-ablation PVI Dec 2014        Cellulitis of ankle     Right     Cervical dysplasia      Contact dermatitis and other eczema due to plants (except food)     Poison Ivy     Edema     due to arthritis     Herpes simplex     type 1     History of transfusion      Hyperlipidemia      Insufficiency fracture of tibia 11/20/2015    Right ankle insufficiency fracture     Motion sickness      PONV (postoperative nausea and vomiting)      Rheumatoid arthritis (H)      Rheumatoid arthritis (H)      Sinus bradycardia      Ureteral stone     Left     Vitamin D deficiency        No Known Allergies      Current Outpatient Medications:      calcium citrate-vitamin D (CITRACAL+D) 315-200 mg-unit per tablet, [CALCIUM CITRATE-VITAMIN D (CITRACAL+D) 315-200 MG-UNIT PER TABLET] Take 1 tablet by mouth 2 (two) times a day., Disp: , Rfl:      cholecalciferol, vitamin D3,  "1,000 unit tablet, [CHOLECALCIFEROL, VITAMIN D3, 1,000 UNIT TABLET] Take 1,000 Units by mouth daily., Disp: , Rfl:      multivit-min/ferrous fumarate (MULTI VITAMIN ORAL), [MULTIVIT-MIN/FERROUS FUMARATE (MULTI VITAMIN ORAL)] Take by mouth daily., Disp: , Rfl:      oxyCODONE (ROXICODONE) 5 MG tablet, Take 1-2 tablets (5-10 mg) by mouth every 6 hours as needed for moderate to severe pain (Patient not taking: Reported on 4/12/2023), Disp: 16 tablet, Rfl: 0    Review of Systems - Negative       OBJECTIVE:  Appearance: alert, well appearing, and in no distress.    /70   Pulse 55   Resp 16   Ht 5' 6\" (1.676 m)   Wt 122 lb (55.3 kg)   SpO2 95%   BMI 19.69 kg/m      Surgical site on the dorsal left forefoot has intact sutures with skin edges well coapted, no gapping noted. No erythema left foot. Neurovascular status unchanged left foot.     "

## 2023-04-12 NOTE — PATIENT INSTRUCTIONS
For the next 3 weeks Dr. Acevedo would like you to remain LIMITED WEIGHT BEARING MEANS THAT IT IS ONLY OKAY FOR YOU TO APPLY LIGHT PRESSURE ON THE AFFECTED FOOT WHEN TRANSFERRING FROM YOUR ASSISTIVE DEVICE TO A CHAIR OR BED. on your left foot with the use of your CAM BOOT, to allow the newly healed skin to become stronger.    Use rolling knee walker for longer walks.    You may begin showering as normal in 3 weeks.    You should avoid soaking your left foot for the next  3 weeks, this includes swimming and hot tubs.    Continue to monitor the area for breakdown & call us if your wound reopens.    Remove gauze dressing tomorrow. Allow the steri strips to fall off on their own, do not pull these off.     We want to hear from you!   In the next few weeks, you should receive a call or email to complete a survey about your visit at [facility]. Please help us improve your appointment experience by letting us know how we did today. We strive to make your experience good and value any ways in which we could do better.      We value your input and suggestions.    Thank you for choosing the [facility]!

## 2023-05-08 ENCOUNTER — HEALTH MAINTENANCE LETTER (OUTPATIENT)
Age: 68
End: 2023-05-08

## 2023-07-30 ENCOUNTER — HEALTH MAINTENANCE LETTER (OUTPATIENT)
Age: 68
End: 2023-07-30

## 2023-08-24 ENCOUNTER — OFFICE VISIT (OUTPATIENT)
Dept: FAMILY MEDICINE | Facility: CLINIC | Age: 68
End: 2023-08-24
Payer: COMMERCIAL

## 2023-08-24 VITALS
SYSTOLIC BLOOD PRESSURE: 132 MMHG | OXYGEN SATURATION: 98 % | WEIGHT: 126.9 LBS | HEART RATE: 70 BPM | DIASTOLIC BLOOD PRESSURE: 75 MMHG | RESPIRATION RATE: 16 BRPM | BODY MASS INDEX: 20.48 KG/M2 | TEMPERATURE: 97.9 F

## 2023-08-24 DIAGNOSIS — R07.0 THROAT PAIN: Primary | ICD-10-CM

## 2023-08-24 DIAGNOSIS — J02.9 VIRAL PHARYNGITIS: ICD-10-CM

## 2023-08-24 LAB
DEPRECATED S PYO AG THROAT QL EIA: NEGATIVE
GROUP A STREP BY PCR: DETECTED

## 2023-08-24 PROCEDURE — 99213 OFFICE O/P EST LOW 20 MIN: CPT | Performed by: STUDENT IN AN ORGANIZED HEALTH CARE EDUCATION/TRAINING PROGRAM

## 2023-08-24 PROCEDURE — 87651 STREP A DNA AMP PROBE: CPT | Performed by: STUDENT IN AN ORGANIZED HEALTH CARE EDUCATION/TRAINING PROGRAM

## 2023-08-24 NOTE — PROGRESS NOTES
Assessment & Plan     Viral Pharyngitis  Throat pain  Will treat patient with supportive and symptomatic measures for pharyngitis at this time which include: Fluids, rest, over-the-counter medications; decongestants, antihistamines, and expectorants, side effects of medications reviewed. Educated patient that honey, warm fluids and gargling saltwater can also help. She was additionally tested for bacterial cause and rapid test was negative for streptococcal A; PCR test pending (updated pt results will come via mychart/call and rx will be reflexed if positive), additionally, educated patient to monitor their symptoms for improvement over the next week and to return for a follow up if the sore throat and/or tonsil swelling does not improve in the next 5-7 days. Educated patient on the warning signs of a peritonsillar abscess and to report to the emergency department if she notices any of these (trismus, dysphonia, uvular deviation, unilateral edema of peritonsillar region  - Streptococcus A Rapid Screen w/Reflex to PCR - Clinic Collect  - Group A Streptococcus PCR Throat Swab     20 minutes spent by me on the date of the encounter doing chart review, history and exam, documentation and further activities per the note    No follow-ups on file.    Yisel Chun MD  [facility]    Lee Robb is a 67 year old female who presents to clinic today for the following health issues:  Chief Complaint   Patient presents with    Throat Pain     Last few day, cold and throat pain, headaches, throat worse today, pain with swallowing, no fever or cough     HPI    Phlegm in the throat, hard to get rid of it. Painful to drink a cold drink. Harder to swallow a lot of mucus. Overall aching and not feeling well. Symptoms worse as the day progresses. Chest pain after coughing.     URI Adult    Onset of symptoms was 3-4 day(s) ago.  Course of illness is same.    Severity moderate  Current and  none Associated symptoms: chills, cough - non-productive, sore throat, hoarse voice, headache, body aches, fatigue, nausea, and diarrhea  Treatment measures tried include Tylenol/Ibuprofen, Fluids, Rest, and taking showers.  Predisposing factors include None.    Review of Systems  Constitutional, HEENT, cardiovascular, pulmonary, gi and gu systems are negative, except as otherwise noted.      Objective    /75   Pulse 70   Temp 97.9  F (36.6  C) (Oral)   Resp 16   Wt 57.6 kg (126 lb 14.4 oz)   SpO2 98%   BMI 20.48 kg/m    Physical Exam   GENERAL: healthy, alert and no distress  EYES: PERRLA, EOMI, conjunctiva and sclera non injected  EARS: TM intact, mild non purulent effusion, normal external auditory canal.  HENT: moist mucus membranes, mild posterior pharyngeal erythema, minimal clear nasal discharge  NECK: no adenopathy, no asymmetry, masses, or scars and thyroid normal to palpation  RESP: lungs clear to auscultation - no rales, rhonchi or wheezes  CV: regular rate and rhythm, normal S1 S2, no S3 or S4, no murmur, click or rub, no peripheral edema and peripheral pulses strong  ABDOMEN: soft, nontender, no hepatosplenomegaly, no masses and bowel sounds normal  MS: no gross musculoskeletal defects noted, no edema    Results for orders placed or performed in visit on 08/24/23 (from the past 24 hour(s))   Streptococcus A Rapid Screen w/Reflex to PCR - Clinic Collect    Specimen: Throat; Swab   Result Value Ref Range    Group A Strep antigen Negative Negative

## 2023-08-25 DIAGNOSIS — J02.0 STREPTOCOCCAL PHARYNGITIS: Primary | ICD-10-CM

## 2023-08-25 RX ORDER — AMOXICILLIN 500 MG/1
500 CAPSULE ORAL 2 TIMES DAILY
Qty: 20 CAPSULE | Refills: 0 | Status: SHIPPED | OUTPATIENT
Start: 2023-08-25 | End: 2023-11-21

## 2023-09-11 ENCOUNTER — OFFICE VISIT (OUTPATIENT)
Dept: FAMILY MEDICINE | Facility: CLINIC | Age: 68
End: 2023-09-11
Payer: COMMERCIAL

## 2023-09-11 VITALS
DIASTOLIC BLOOD PRESSURE: 83 MMHG | BODY MASS INDEX: 20.18 KG/M2 | HEART RATE: 66 BPM | OXYGEN SATURATION: 97 % | TEMPERATURE: 97.7 F | RESPIRATION RATE: 16 BRPM | WEIGHT: 125 LBS | SYSTOLIC BLOOD PRESSURE: 147 MMHG

## 2023-09-11 DIAGNOSIS — R39.89 URINARY PROBLEM: ICD-10-CM

## 2023-09-11 DIAGNOSIS — Z20.822 SUSPECTED 2019 NOVEL CORONAVIRUS INFECTION: ICD-10-CM

## 2023-09-11 DIAGNOSIS — N39.0 URINARY TRACT INFECTION WITHOUT HEMATURIA, SITE UNSPECIFIED: Primary | ICD-10-CM

## 2023-09-11 LAB
ALBUMIN UR-MCNC: NEGATIVE MG/DL
APPEARANCE UR: CLEAR
BACTERIA #/AREA URNS HPF: ABNORMAL /HPF
BILIRUB UR QL STRIP: NEGATIVE
COLOR UR AUTO: YELLOW
GLUCOSE UR STRIP-MCNC: NEGATIVE MG/DL
HGB UR QL STRIP: ABNORMAL
KETONES UR STRIP-MCNC: NEGATIVE MG/DL
LEUKOCYTE ESTERASE UR QL STRIP: NEGATIVE
NITRATE UR QL: POSITIVE
PH UR STRIP: 6 [PH] (ref 5–8)
RBC #/AREA URNS AUTO: ABNORMAL /HPF
SP GR UR STRIP: 1.01 (ref 1–1.03)
SQUAMOUS #/AREA URNS AUTO: ABNORMAL /LPF
UROBILINOGEN UR STRIP-ACNC: 0.2 E.U./DL
WBC #/AREA URNS AUTO: ABNORMAL /HPF

## 2023-09-11 PROCEDURE — 87635 SARS-COV-2 COVID-19 AMP PRB: CPT | Performed by: FAMILY MEDICINE

## 2023-09-11 PROCEDURE — 81001 URINALYSIS AUTO W/SCOPE: CPT | Performed by: FAMILY MEDICINE

## 2023-09-11 PROCEDURE — 99214 OFFICE O/P EST MOD 30 MIN: CPT | Performed by: FAMILY MEDICINE

## 2023-09-11 PROCEDURE — 87086 URINE CULTURE/COLONY COUNT: CPT | Performed by: FAMILY MEDICINE

## 2023-09-11 RX ORDER — CEPHALEXIN 500 MG/1
500 CAPSULE ORAL 2 TIMES DAILY
Qty: 20 CAPSULE | Refills: 0 | Status: SHIPPED | OUTPATIENT
Start: 2023-09-11 | End: 2023-09-21

## 2023-09-12 LAB — SARS-COV-2 RNA RESP QL NAA+PROBE: NEGATIVE

## 2023-09-12 NOTE — PATIENT INSTRUCTIONS
Start  cephalexin  2 times a day for 10 days always take with food to prevent nausea vomiting     If fever, nausea, vomiting, worsening abdominal pain, pain in back-not typical back pain to ER         Return to clinic or to ER if symptoms worsen     Follow up with your primary care provider or clinic in about 2-3 days  if your symptoms do not improve

## 2023-09-12 NOTE — PROGRESS NOTES
ASSESSMENT/PLAN:      ICD-10-CM    1. Urinary tract infection without hematuria, site unspecified  N39.0 cephALEXin (KEFLEX) 500 MG capsule     CANCELED: Urine Culture Aerobic Bacterial - lab collect      2. Urinary problem  R39.89 UA Macroscopic with reflex to Microscopic and Culture - Clinic Collect     Urine Microscopic Exam     Urine Culture      3. Suspected 2019 novel coronavirus infection  Z20.822 Symptomatic COVID-19 Virus (Coronavirus) by PCR Nose          Patient Instructions       Start  cephalexin  2 times a day for 10 days always take with food to prevent nausea vomiting     If fever, nausea, vomiting, worsening abdominal pain, pain in back-not typical back pain to ER         Return to clinic or to ER if symptoms worsen     Follow up with your primary care provider or clinic in about 2-3 days  if your symptoms do not improve          Reviewed medication instructions and side effects. Follow up if experiences side effects.     I reviewed supportive care, otc meds to use if needed, expected course, and signs of concern.  Follow up as needed or if she does not improve within  1-2 days or if worsens in any way.  Reviewed red flag symptoms and is to go to the ER if experiences any of these.     The use of Dragon/GeoVax dictation services may have been used to construct the content in this note; any grammatical or spelling errors are non-intentional. Please contact the author of this note directly if you are in need of any clarification.                  Patient presents with:  Urinary Problem: Frequency and back on right side aches and generally feeling ill.       Subjective     Clarisse Dubon is a 67 year old female who presents to clinic today for the following health issues:    HPI       URINARY TRACT SYMPTOMS    Duration: Onset this a.m.  Description  dysuria, frequency, and urgency  Accompanying signs and symptoms:  Fever/chills: no   Flank pain no, pain more in the lower lumbar area on the  right  Nausea and vomiting: YES-nausea no vomiting  Vaginal symptoms: none  Abdominal/Pelvic Pain: no   History  History of frequent UTI's: no   History of kidney stones: YES-years ago not having similar pain she had with kidney stones in the past  Sexually Active: YES  Possibility of pregnancy: No  Precipitating or alleviating factors: None  Therapies tried and outcome: Azo at 1 PM today   outcome: Some relief of dysuria and bladder pain      Past Medical History:   Diagnosis Date    Arthritis     Atrial fibrillation (H)     Diagnosed 1/2/14.  CHADS2 - VASc score = 1 (gender) ASA Rx Sotalol (bradycardia) Flecainide pre-ablation PVI Dec 2014       Cellulitis of ankle     Right    Cervical dysplasia     Contact dermatitis and other eczema due to plants (except food)     Poison Ivy    Edema     due to arthritis    Herpes simplex     type 1    History of transfusion     Hyperlipidemia     Insufficiency fracture of tibia 11/20/2015    Right ankle insufficiency fracture    Motion sickness     PONV (postoperative nausea and vomiting)     Rheumatoid arthritis (H)     Rheumatoid arthritis (H)     Sinus bradycardia     Ureteral stone     Left    Vitamin D deficiency      Social History     Tobacco Use    Smoking status: Never    Smokeless tobacco: Never   Substance Use Topics    Alcohol use: Not Currently       Current Outpatient Medications   Medication Sig Dispense Refill    cephALEXin (KEFLEX) 500 MG capsule Take 1 capsule (500 mg) by mouth 2 times daily for 10 days 20 capsule 0    amoxicillin (AMOXIL) 500 MG capsule Take 1 capsule (500 mg) by mouth 2 times daily (Patient not taking: Reported on 9/11/2023) 20 capsule 0    calcium citrate-vitamin D (CITRACAL+D) 315-200 mg-unit per tablet [CALCIUM CITRATE-VITAMIN D (CITRACAL+D) 315-200 MG-UNIT PER TABLET] Take 1 tablet by mouth 2 (two) times a day. (Patient not taking: Reported on 9/11/2023)      cholecalciferol, vitamin D3, 1,000 unit tablet [CHOLECALCIFEROL, VITAMIN D3,  1,000 UNIT TABLET] Take 1,000 Units by mouth daily. (Patient not taking: Reported on 9/11/2023)      multivit-min/ferrous fumarate (MULTI VITAMIN ORAL) [MULTIVIT-MIN/FERROUS FUMARATE (MULTI VITAMIN ORAL)] Take by mouth daily. (Patient not taking: Reported on 9/11/2023)      oxyCODONE (ROXICODONE) 5 MG tablet Take 1-2 tablets (5-10 mg) by mouth every 6 hours as needed for moderate to severe pain (Patient not taking: Reported on 4/12/2023) 16 tablet 0     No Known Allergies          ROS are negative, except as otherwise noted HPI      Objective    BP (!) 147/83   Pulse 66   Temp 97.7  F (36.5  C) (Oral)   Resp 16   Wt 56.7 kg (125 lb)   SpO2 97%   BMI 20.18 kg/m    Body mass index is 20.18 kg/m .  Physical Exam   GENERAL: alert and mild distress  ABDOMEN: soft, tender over suprapubic area/bladder and bowel sounds normal, no CVA tenderness bilateral  NEURO: Normal strength and tone, mentation intact and speech normal, normal gait  BACK: Mild paralumbar tenderness right mid lumbar area      Diagnostic Test Results:  Labs reviewed in Epic  Results for orders placed or performed in visit on 09/11/23   UA Macroscopic with reflex to Microscopic and Culture - Clinic Collect     Status: Abnormal    Specimen: Urine, Clean Catch   Result Value Ref Range    Color Urine Yellow Colorless, Straw, Light Yellow, Yellow    Appearance Urine Clear Clear    Glucose Urine Negative Negative mg/dL    Bilirubin Urine Negative Negative    Ketones Urine Negative Negative mg/dL    Specific Gravity Urine 1.010 1.005 - 1.030    Blood Urine Small (A) Negative    pH Urine 6.0 5.0 - 8.0    Protein Albumin Urine Negative Negative mg/dL    Urobilinogen Urine 0.2 0.2, 1.0 E.U./dL    Nitrite Urine Positive (A) Negative    Leukocyte Esterase Urine Negative Negative   Urine Microscopic Exam     Status: Abnormal   Result Value Ref Range    Bacteria Urine Few (A) None Seen /HPF    RBC Urine None Seen 0-2 /HPF /HPF    WBC Urine None Seen 0-5 /HPF /HPF     Squamous Epithelials Urine Few (A) None Seen /LPF

## 2023-09-13 LAB — BACTERIA UR CULT: NO GROWTH

## 2023-10-13 ENCOUNTER — NURSE TRIAGE (OUTPATIENT)
Dept: NURSING | Facility: CLINIC | Age: 68
End: 2023-10-13
Payer: COMMERCIAL

## 2023-11-21 ENCOUNTER — VIRTUAL VISIT (OUTPATIENT)
Dept: FAMILY MEDICINE | Facility: CLINIC | Age: 68
End: 2023-11-21
Payer: COMMERCIAL

## 2023-11-21 DIAGNOSIS — M54.50 ACUTE LOW BACK PAIN WITHOUT SCIATICA, UNSPECIFIED BACK PAIN LATERALITY: Primary | ICD-10-CM

## 2023-11-21 PROCEDURE — 99213 OFFICE O/P EST LOW 20 MIN: CPT | Mod: VID | Performed by: FAMILY MEDICINE

## 2023-11-21 RX ORDER — TRIAMCINOLONE ACETONIDE 1 MG/G
CREAM TOPICAL
COMMUNITY
End: 2024-04-03

## 2023-11-21 NOTE — PATIENT INSTRUCTIONS
I recommend scheduling with physical therapy:     Referred By  Referred To   Ciera Penaloza MD   [address]   [facility] [address]   [place] [address]   Phone: [phone]   Fax: [phone]    Diagnoses: Acute low back pain without sciatica, unspecified back pain laterality   Order: Physical Therapy Referral       Comment: Please be aware that coverage of these services is subject to the terms and limitations of your health insurance plan.  Call member services at your health plan with any benefit or coverage questions.   If you have not heard from the scheduling office within 2 business days, please call [phone] for [employer] [place], [phone] for [employer] and [phone] for [facility].       You can continue ibuprofen 400mg every 6 hours with food or milk.

## 2023-11-21 NOTE — PROGRESS NOTES
Clarisse is a 67 year old who is being evaluated via a billable video visit.      How would you like to obtain your AVS? MyChart  If the video visit is dropped, the invitation should be resent by: Send to e-mail at: [email or web address]  Will anyone else be joining your video visit? No      Assessment & Plan     Acute low back pain without sciatica, unspecified back pain laterality  Saw [facility] urgent care.  Records not available.  Per her report, no fractures on x-ray.  Was told it might be a mild ligament tear and was given prednisone burst.  She felt better temporarily with the prednisone.  Pain is back and is mild.  Ibuprofen helps with the pain.  She has history of normal renal function.  I recommended scheduling with physical therapy.  She can continue with ibuprofen 400 mg with food or milk as needed for the back pain.  If pain persist despite 6 to 8 weeks of physical therapy, would recommend seeing back specialist for further evaluation.  Denies red flag symptoms  - Physical Therapy Referral           MED REC REQUIRED   Post Medication Reconciliation Status:  Discharge medications reconciled and changed, see notes/orders     Patient Instructions   I recommend scheduling with physical therapy:     Referred By  Referred To   Ciera Penaloza MD   [address]  [place] [address]   Phone: [phone]   Fax: [phone]    Diagnoses: Acute low back pain without sciatica, unspecified back pain laterality   Order: Physical Therapy Referral       Comment: Please be aware that coverage of these services is subject to the terms and limitations of your health insurance plan.  Call member services at your health plan with any benefit or coverage questions.   If you have not heard from the scheduling office within 2 business days, please call [phone] for [employer] [place], [phone] for [place] and [phone] for [facility].       You can continue ibuprofen 400mg every 6 hours with  food or milk.      Ciera Penaloza MD,   [facility]    Lee Robb is a 67 year old, presenting for the following health issues:  Hospital F/U (U/C )        11/21/2023     1:26 PM   Additional Questions   Roomed by Nisreen Daly   Accompanied by Self          ED/UC Followup:    Facility:  [facility]  Date of visit: 3 and a half weeks   Reason for visit: Back Pain  Current Status: Today pt stated that its not to bad only when moving a lot and getting up and down, it hurts sitting down and sleeping on the side. She also stated that it keeps clicking as well.      History of Present Illness       Back Pain:  She presents for follow up of back pain. Patient's back pain is a new problem.    Original cause of back pain: not sure  First noticed back pain: 1-4 weeks ago  Patient feels back pain: dailyLocation of back pain:  Right lower back  Description of back pain: dull ache  Back pain spreads: right buttocks    Since patient first noticed back pain, pain is: always present, but gets better and worse  Does back pain interfere with her job:  No  On a scale of 1-10 (10 being the worst), patient describes pain as:  4  What makes back pain worse: bending, certain positions and sitting   Acetaminophen: helpful  Chiropractor:  Not tried  Cold: not helpful  Heat: not helpful  Massage: helpful  NSAIDS: helpful  Opioids: not tried  Physical Therapy: not tried  Rest: helpful  Steroid Injection: not tried  Stretching: helpful  Surgery: not tried  TENS unit: not tried  Other healthcare providers patient is seeing for back pain: None    She eats 2-3 servings of fruits and vegetables daily.She consumes 1 sweetened beverage(s) daily.She exercises with enough effort to increase her heart rate 20 to 29 minutes per day.  She exercises with enough effort to increase her heart rate 4 days per week.      She denies weakness, numbness, tingling, radiation of the pain, saddle anesthesia, fevers.  She had back  "x-ray at the orthopedist office and no fracture.  She was doing some heavy lifting and thought that might have been the reason for the pain starting.  She sought care because the pain persisted.  Continues to hear a clicking sound in her back periodically.    October 13, 2023  Yahaira Og RN         10/13/23 11:34 AM  Note  Call transferred via Care Coordinator. Pt concerned about a clicking noise in her lower back and back pain.     Says she lifted something heavy about 6 weeks ago and afterward, noticed some back pain. Thought she was getting better after awhile, but now is getting worse again.      Hears it crack if she twists or goes to sit down. Rates her pain a 5-6, but only happens when she goes to sit down or moves a certain way. Patient walks every day and says movement helps. Has been using pillows to support her back. Pain is infrequent, but isn't going away after all this time, so she is concerned. Denies any numbness, tingling or weakness in legs or groin. No bowel or bladder issues or abdominal or chest pain. Also has had nausea and loss of appetite for the last week. Unsure if it is related to the back issue, Is eating, but not much.     Disposition: See in office today or tomorrow. Recommend urgent care, either regular or orthopedic given that it is jail through the day and it is Friday. Gave information for [facility] in [place] and also suggested trying [facility]. Pt says they will try going to [facility] in [place], as it is near her home. Call back information discussed.      Yahaira Ruiz RN, BSN  -[facility]   Triage Nurse Advisor            Review of Systems        Objective    Vitals - Patient Reported  Weight (Patient Reported): 54 kg (119 lb)  Height (Patient Reported): 165.1 cm (5' 5\")  BMI (Based on Pt Reported Ht/Wt): 19.8  Pain Score: Mild Pain (3)  Pain Loc: Low Back      Vitals:  No vitals were obtained today due to virtual visit.    Physical Exam "   GENERAL: Healthy, alert and no distress  EYES: Eyes grossly normal to inspection.  No discharge or erythema, or obvious scleral/conjunctival abnormalities.  RESP: No audible wheeze, cough, or visible cyanosis.  No visible retractions or increased work of breathing.    SKIN: Visible skin clear. No significant rash, abnormal pigmentation or lesions.  NEURO: Cranial nerves grossly intact.  Mentation and speech appropriate for age.  PSYCH: Mentation appears normal, affect normal/bright, judgement and insight intact, normal speech and appearance well-groomed.         Video-Visit Details    Type of service:  Video Visit   Video Start Time:  1:40  Video End Time:2:03 PM    Originating Location (pt. Location): Home    Distant Location (provider location):  Off-site  Platform used for Video Visit: lifeIO

## 2023-11-29 ENCOUNTER — THERAPY VISIT (OUTPATIENT)
Dept: PHYSICAL THERAPY | Facility: REHABILITATION | Age: 68
End: 2023-11-29
Attending: FAMILY MEDICINE
Payer: COMMERCIAL

## 2023-11-29 DIAGNOSIS — R26.9 ALTERED GAIT: Primary | ICD-10-CM

## 2023-11-29 DIAGNOSIS — M54.50 ACUTE LOW BACK PAIN WITHOUT SCIATICA, UNSPECIFIED BACK PAIN LATERALITY: ICD-10-CM

## 2023-11-29 PROCEDURE — 97161 PT EVAL LOW COMPLEX 20 MIN: CPT | Mod: GP | Performed by: PHYSICAL THERAPIST

## 2023-11-29 PROCEDURE — 97110 THERAPEUTIC EXERCISES: CPT | Mod: GP | Performed by: PHYSICAL THERAPIST

## 2023-11-29 NOTE — PROGRESS NOTES
PHYSICAL THERAPY EVALUATION  Type of Visit: Evaluation    See electronic medical record for Abuse and Falls Screening details.    Subjective       Presenting condition or subjective complaint: I lifted something heavy and think that may be what started the pain.  She went to [facility], had an X-ray which was negative, and tried Prednisone which helped short term. The pain is constant with varying intensity.  Sitting is the worst activity and moving helps decrease pain.  the pain is described as sharp at times and hurts.  Denies LE involvement.  She tries to exercise.  She does have a clicking noise but does not hurt.  She does not use any ice.  Earlier this year she had left foot surgery, then once that healed, right knee pain in which she has arthritis.  Date of onset: 10/29/23    Relevant medical history: Arthritis; Heart problems; Osteoporosis   Dates & types of surgery: cardiac ablation heart surgery 2014 Hysterectomy    Prior diagnostic imaging/testing results: X-ray     Prior therapy history for the same diagnosis, illness or injury: No      Prior Level of Function  Transfers:   Ambulation:   ADL:   IADL:     Living Environment  Social support: With a significant other or spouse   Type of home: House   Stairs to enter the home: No       Ramp: No   Stairs inside the home: Yes 15 Is there a railing: Yes   Help at home: None  Equipment owned:       Employment: No    Hobbies/Interests: reading going to the cabin taking long walks exploring    Patient goals for therapy: I would like to walk,sit down for longer periods, and remain active without my back hurting,    Pain assessment:      Objective   LUMBAR SPINE EVALUATION  PAIN: Pain Level at Rest: 1/10  Pain Level with Use: 8/10  INTEGUMENTARY (edema, incisions):   POSTURE:  valgus at LE hips with R>L, increase thoracic kyphosis  GAIT:   Weightbearing Status:   Assistive Device(s): None  Gait Deviations: Antalgic  BALANCE/PROPRIOCEPTION:  "  WEIGHTBEARING ALIGNMENT:   NON-WEIGHTBEARING ALIGNMENT:    ROM: AROM WNL  Audible \"crack\" when returning to neutral from trunk flexion, pain with trunk flexion and extension;  R knee extension ~ -10 from neutral  PELVIC/SI SCREEN:   STRENGTH:  R leg strength limited due to R knee pain, L LE strength N throughout    MYOTOMES:   DTR S:   CORD SIGNS:   DERMATOMES:   NEURAL TENSION:   FLEXIBILITY: WNL  LUMBAR/HIP Special Tests:    Left Right   SILVIA  Negative    FADIR/Labrum/LEE     Femoral Nerve     Ethan's  Negative    Piriformis  Positive   Quadrant Testing     SLR  Negative    Slump  Negative    Stork with Extension     Fahad  Negative            PELVIS/SI SPECIAL TESTS:  negative SI provocation tests  FUNCTIONAL TESTS:   PALPATION:   + Tenderness At Location Left Right   Quadratus Lumborum  +   Erector Spinae  +   Piriformis  + +   PSIS + +   ASIS  +   Iliac Crest  +   Glut Medius  +   Greater Trochanter  -   Ischial Tuberosity  -   Hamstrings  -   Hip Flexors  -   Vertebral   +     SPINAL SEGMENTAL CONCLUSIONS: WNL  Pain from mid thoracic through lumbar region      Assessment & Plan   CLINICAL IMPRESSIONS  Medical Diagnosis: Acute Low Back Pain    Treatment Diagnosis: acute low back pain, altered gait   Impression/Assessment: Patient is a 67 year old female with low back pain complaints.  The following significant findings have been identified: Pain, Decreased joint mobility, Decreased strength, Impaired gait, Impaired muscle performance, and Decreased activity tolerance. These impairments interfere with their ability to perform self care tasks, work tasks, recreational activities, household mobility, and community mobility as compared to previous level of function.     Clinical Decision Making (Complexity):  Clinical Presentation: Stable/Uncomplicated  Clinical Presentation Rationale: based on medical and personal factors listed in PT evaluation  Clinical Decision Making (Complexity): Low complexity    PLAN OF " CARE  Treatment Interventions:  Interventions: Manual Therapy, Neuromuscular Re-education, Therapeutic Activity, Therapeutic Exercise    Long Term Goals     PT Goal 1  Goal Identifier: sitting  Goal Description: Pt will be able to sit for 15-20 minutes with less reported pain of 0-2/10  Rationale: to maximize safety and independence with performance of ADLs and functional tasks  Target Date: 02/27/24  PT Goal 2  Goal Identifier: walking  Goal Description: Pt will be able to demonstrate more normal gait pattern with increase knee extension less reported low back pain of 0-2/10  Rationale: to maximize safety and independence with performance of ADLs and functional tasks  Target Date: 02/27/24      Frequency of Treatment: 2x/week  Duration of Treatment: 90days    Recommended Referrals to Other Professionals:   Education Assessment:   Learner/Method: Patient    Risks and benefits of evaluation/treatment have been explained.   Patient/Family/caregiver agrees with Plan of Care.     Evaluation Time:     PT Eval, Low Complexity Minutes (71237): 20       Signing Clinician: ANNEL Gan [facility]                                                                                   OUTPATIENT PHYSICAL THERAPY      PLAN OF TREATMENT FOR OUTPATIENT REHABILITATION   Patient's Last Name, First Name, Clarisse Reyes YOB: 1955   Provider's Name   [facility]   Medical Record No.  [id number]     Onset Date: 10/29/23  Start of Care Date: 11/29/23     Medical Diagnosis:  Acute Low Back Pain      PT Treatment Diagnosis:  acute low back pain, altered gait Plan of Treatment  Frequency/Duration: 2x/week/ 90days    Certification date from 11/29/23 to 02/27/24         See note for plan of treatment details and functional goals     Hailey Arevalo PT                         I CERTIFY THE NEED FOR THESE SERVICES FURNISHED UNDER        THIS PLAN  OF TREATMENT AND WHILE UNDER MY CARE     (Physician attestation of this document indicates review and certification of the therapy plan).              Referring Provider:  Ciera Penaloza    Initial Assessment  See Epic Evaluation- Start of Care Date: 11/29/23

## 2023-12-04 ENCOUNTER — THERAPY VISIT (OUTPATIENT)
Dept: PHYSICAL THERAPY | Facility: REHABILITATION | Age: 68
End: 2023-12-04
Payer: COMMERCIAL

## 2023-12-04 DIAGNOSIS — R26.9 ALTERED GAIT: Primary | ICD-10-CM

## 2023-12-04 PROCEDURE — 97140 MANUAL THERAPY 1/> REGIONS: CPT | Mod: GP | Performed by: PHYSICAL THERAPIST

## 2023-12-04 PROCEDURE — 97110 THERAPEUTIC EXERCISES: CPT | Mod: GP | Performed by: PHYSICAL THERAPIST

## 2023-12-11 ENCOUNTER — THERAPY VISIT (OUTPATIENT)
Dept: PHYSICAL THERAPY | Facility: REHABILITATION | Age: 68
End: 2023-12-11
Payer: COMMERCIAL

## 2023-12-11 DIAGNOSIS — M54.50 ACUTE LOW BACK PAIN WITHOUT SCIATICA, UNSPECIFIED BACK PAIN LATERALITY: ICD-10-CM

## 2023-12-11 DIAGNOSIS — R26.9 ALTERED GAIT: Primary | ICD-10-CM

## 2023-12-11 PROCEDURE — 97110 THERAPEUTIC EXERCISES: CPT | Mod: GP | Performed by: PHYSICAL THERAPIST

## 2023-12-11 PROCEDURE — 97140 MANUAL THERAPY 1/> REGIONS: CPT | Mod: GP | Performed by: PHYSICAL THERAPIST

## 2023-12-18 ENCOUNTER — THERAPY VISIT (OUTPATIENT)
Dept: PHYSICAL THERAPY | Facility: REHABILITATION | Age: 68
End: 2023-12-18
Payer: COMMERCIAL

## 2023-12-18 DIAGNOSIS — R26.9 ALTERED GAIT: Primary | ICD-10-CM

## 2023-12-18 DIAGNOSIS — M54.50 ACUTE LOW BACK PAIN WITHOUT SCIATICA, UNSPECIFIED BACK PAIN LATERALITY: ICD-10-CM

## 2023-12-18 PROCEDURE — 97110 THERAPEUTIC EXERCISES: CPT | Mod: GP | Performed by: PHYSICAL THERAPIST

## 2023-12-18 PROCEDURE — 97140 MANUAL THERAPY 1/> REGIONS: CPT | Mod: GP | Performed by: PHYSICAL THERAPIST

## 2024-01-04 ENCOUNTER — THERAPY VISIT (OUTPATIENT)
Dept: PHYSICAL THERAPY | Facility: REHABILITATION | Age: 69
End: 2024-01-04
Payer: COMMERCIAL

## 2024-01-04 DIAGNOSIS — R26.9 ALTERED GAIT: Primary | ICD-10-CM

## 2024-01-04 DIAGNOSIS — M54.50 ACUTE LOW BACK PAIN WITHOUT SCIATICA, UNSPECIFIED BACK PAIN LATERALITY: ICD-10-CM

## 2024-01-04 PROCEDURE — 97140 MANUAL THERAPY 1/> REGIONS: CPT | Mod: GP | Performed by: PHYSICAL THERAPIST

## 2024-01-04 PROCEDURE — 97110 THERAPEUTIC EXERCISES: CPT | Mod: GP | Performed by: PHYSICAL THERAPIST

## 2024-01-04 NOTE — PROGRESS NOTES
DISCHARGE  Reason for Discharge: back goal is met.  Will be talking to MD about continued knee pain    Equipment Issued:     Discharge Plan: Patient to continue home program.    Referring Provider:  Ciera Penaloza       01/04/24 0500   Appointment Info   Signing clinician's name / credentials Hailey Arevalo, PT   Visits Used 5   Medical Diagnosis Acute Low Back Pain   PT Tx Diagnosis acute low back pain, altered gait   Progress Note/Certification   Start of Care Date 11/29/23   Onset of illness/injury or Date of Surgery 10/29/23   Therapy Frequency 2x/week   Predicted Duration 90days   Certification date from 11/29/23   Certification date to 02/27/24   Progress Note Due Date 02/27/24   PT Goal 1   Goal Identifier sitting   Goal Description Pt will be able to sit for 15-20 minutes with less reported pain of 0-2/10   Rationale to maximize safety and independence with performance of ADLs and functional tasks   Goal Progress goal met.  Pt is able to sit on her couch and in the car with no increase in pain.  She can sit as long as she would like   Target Date 02/27/24   Date Met 01/04/24   PT Goal 2   Goal Identifier walking   Goal Description Pt will be able to demonstrate more normal gait pattern with increase knee extension less reported low back pain of 0-2/10   Rationale to maximize safety and independence with performance of ADLs and functional tasks   Target Date 02/27/24   Goal Progress not met.  continue to have knee issues and may see an orthopedist for knee pain   Subjective Report   Subjective Report I have been doing the exercises and they are helpful. Overall my back is feeling much better.  Due to the knee pain I am still walking different and think that may be what causes back pain when it occurs.   Objective Measure 1   Objective Measure RACHEL: 16 on IE   Details RACHEL: 4 on 1/4/24   Therapeutic Procedure/Exercise   Therapeutic Procedures: strength, endurance, ROM, flexibillity minutes (65600) 17    Ther Proc 1 current stretching: touch toes, side bending while standing, some supine stretches (different routines from the TV), just started back at the [facility]-she walks, arm exercises,   Ther Proc 1 - Details stretches: supine: SKC, piriformis above and below 90, quadratus lumborum, hold 30 seconds x 1-3 reps, B   Ther Proc 2 thread the needle hold 30 seconds B X 1-3 reps   Ther Proc 2 - Details cat and cow, hold 2-3 seconds X 5-10 reps   Ther Proc 3 stretches: standing gastroc,  hold 30-60 seconds X 1-3 reps B   Ther Proc 3 - Details bridges hold 10 seconds X 6 reps   Ther Proc 4 treadmill for warm up, 5 min   Ther Proc 4 - Details foot on seconds step and leaning forward for knee AROM   Ther Proc 5 clamshells X 20   Ther Proc 5 - Details sitting hip isometric adduction squeezing pillow, hold 10 seconds X 6 reps, start mild to mod pressure   Ther Proc 6 sidelying hip abduction X 10-20 reps, B   Ther Proc 6 - Details edu on where each of the strengthening ex are helping and what she is feeling is helping.  Pain only lasts a few minutes.  She will continue the exercises   Ther Proc 7 prone hip extension X 10-20, B   Ther Proc 7 - Details abdominal: two leg marching   Ther Proc 8 standing knee terminal extension with green tband X 10-20, R   Skilled Intervention RACHEL, goal review, discussion of exercises and continued knee issues   Manual Therapy   Manual Therapy: Mobilization, MFR, MLD, friction massage minutes (32521) 15   Manual Therapy 1 pt prone: off loading R lumbar region   Manual Therapy 1 - Details G I and II A/P joint mobs at L1-5, B   Patient Response/Progress losser in R low back   Education   Learner/Method Patient   Plan   Plan for next session pt is discharged   Comments   Comments Assessment: Pt returns and has been very compliant with her HEP. She feels most of her back pain has resolved.  She continues to have knee pain so discussed her following up with her MD and possibly seeing an orthopedist.   She achieved her back goal but not her walking goal due to continued knee issues.  Pt is discharged at this time.   Total Session Time   Timed Code Treatment Minutes 32   Total Treatment Time (sum of timed and untimed services) 32

## 2024-04-03 ENCOUNTER — TELEPHONE (OUTPATIENT)
Dept: FAMILY MEDICINE | Facility: CLINIC | Age: 69
End: 2024-04-03

## 2024-04-03 ENCOUNTER — NURSE TRIAGE (OUTPATIENT)
Dept: FAMILY MEDICINE | Facility: CLINIC | Age: 69
End: 2024-04-03

## 2024-04-03 ENCOUNTER — OFFICE VISIT (OUTPATIENT)
Dept: FAMILY MEDICINE | Facility: CLINIC | Age: 69
End: 2024-04-03
Payer: COMMERCIAL

## 2024-04-03 VITALS
WEIGHT: 128 LBS | DIASTOLIC BLOOD PRESSURE: 73 MMHG | RESPIRATION RATE: 16 BRPM | HEART RATE: 62 BPM | TEMPERATURE: 97.9 F | HEIGHT: 66 IN | SYSTOLIC BLOOD PRESSURE: 117 MMHG | BODY MASS INDEX: 20.57 KG/M2 | OXYGEN SATURATION: 98 %

## 2024-04-03 DIAGNOSIS — L03.116 CELLULITIS OF LEFT LOWER EXTREMITY: Primary | ICD-10-CM

## 2024-04-03 DIAGNOSIS — R21 RASH: ICD-10-CM

## 2024-04-03 PROBLEM — L24.7 CONTACT DERMATITIS AND ECZEMA DUE TO PLANT: Status: RESOLVED | Noted: 2018-06-03 | Resolved: 2024-04-03

## 2024-04-03 PROBLEM — L03.211 FACIAL CELLULITIS: Status: RESOLVED | Noted: 2018-06-03 | Resolved: 2024-04-03

## 2024-04-03 PROBLEM — R76.8 CYCLIC CITRULLINATED PEPTIDE (CCP) ANTIBODY POSITIVE: Status: RESOLVED | Noted: 2017-03-09 | Resolved: 2024-04-03

## 2024-04-03 PROBLEM — R30.0 DYSURIA: Status: RESOLVED | Noted: 2018-04-11 | Resolved: 2024-04-03

## 2024-04-03 PROBLEM — M67.472 GANGLION CYST OF LEFT FOOT: Status: RESOLVED | Noted: 2023-03-08 | Resolved: 2024-04-03

## 2024-04-03 PROBLEM — R22.42 MASS OF FOOT, LEFT: Status: RESOLVED | Noted: 2023-02-21 | Resolved: 2024-04-03

## 2024-04-03 PROBLEM — B37.31 YEAST INFECTION OF THE VAGINA: Status: RESOLVED | Noted: 2018-06-13 | Resolved: 2024-04-03

## 2024-04-03 PROCEDURE — 99213 OFFICE O/P EST LOW 20 MIN: CPT | Performed by: NURSE PRACTITIONER

## 2024-04-03 RX ORDER — TRIAMCINOLONE ACETONIDE 1 MG/G
CREAM TOPICAL 3 TIMES DAILY
Qty: 30 G | Refills: 1 | Status: SHIPPED | OUTPATIENT
Start: 2024-04-03 | End: 2024-08-06

## 2024-04-03 RX ORDER — CEPHALEXIN 500 MG/1
500 CAPSULE ORAL 3 TIMES DAILY
Qty: 21 CAPSULE | Refills: 0 | Status: SHIPPED | OUTPATIENT
Start: 2024-04-03 | End: 2024-04-10

## 2024-04-03 ASSESSMENT — PAIN SCALES - GENERAL: PAINLEVEL: NO PAIN (0)

## 2024-04-03 NOTE — TELEPHONE ENCOUNTER
Nurse Triage SBAR    Is this a 2nd Level Triage? NO    Situation: Patient called to report open wound on left heel. Would like to be seen.     Background: History of left foot pain, ganglion cyst of left foot. Patient reports she is not sure how the wound originated - reports she was maybe wearing her shoes too tight for too long. Has been present about one week and is not healing.     Assessment: Patient reports open sore about nickel size on left heal. Reports redness and itchy rash/bumps about one inch wide next to sore. Denies any pus, warmth, red streaks, fever.     Protocol Recommended Disposition:   Home Care, See in Office Today    Recommendation: Advised patient have wound evaluated in clinic. Appointment scheduled for today, 4/3.     Appointment scheduled    Does the patient meet one of the following criteria for ADS visit consideration? 16+ years old, with an [facility] PCP     TIP  Providers, please consider if this condition is appropriate for management at one of our Acute and Diagnostic Services sites.     If patient is a good candidate, please use dotphrase <dot>triageresponse and select Refer to ADS to document.     Reason for Disposition   Minor cut or scratch    Additional Information   Negative: Major bleeding (e.g., actively dripping or spurting) and can't be stopped   Negative: Amputation   Negative: Shock suspected (e.g., cold/pale/clammy skin, too weak to stand, low BP, rapid pulse)   Negative: Knife wound (or other possibly deep cut) and to chest, abdomen, back, neck, or head   Negative: Self-injury (e.g., cutting, self-harm) and suicidal or out-of-control   Negative: Sounds like a life-threatening emergency to the triager   Negative: Animal bite and broken skin   Negative: Human bite and broken skin   Negative: Puncture wound   Negative: Bleeding and won't stop after 10 minutes of direct pressure (using correct technique)   Negative: Skin is split open or gaping (or length > 1/2 inch or 12 mm on  "the skin, 1/4 inch or 6 mm on the face)   Negative: Deep cut and can see bone or tendons   Negative: Cut over knuckle (MCP joint)   Negative: Sensation of something in the wound (i.e., retained object in wound)   Negative: Dirt in the wound and not removed with 15 minutes of scrubbing   Negative: Wound causes numbness (i.e., loss of sensation)   Negative: Wound causes weakness (i.e., decreased ability to move hand, finger, toe)   Negative: SEVERE pain and not improved 2 hours after pain medicine   Negative: Looks infected and large red area (> 2 inches or 5 cm) or streak   Negative: Fever and spreading red area or streak   Negative: Suspicious history for the injury   Negative: Looks infected (spreading redness, pus) and no fever   Negative: No prior tetanus shots (or is not fully vaccinated)   Negative: HIV positive or severe immunodeficiency (severely weak immune system) and DIRTY cut   Negative: Patient wants to be seen   Negative: Last tetanus shot > 5 years ago and DIRTY cut   Negative: Last tetanus shot > 10 years ago and CLEAN cut   Negative: After 14 days and wound isn't healed   Negative: Diabetes mellitus and minor cut or scratch on foot   Negative: Minor cut or scratch and from self-injury (e.g., cutting, self-harm) and stable (i.e., not suicidal, not out of control)    Answer Assessment - Initial Assessment Questions  1. APPEARANCE of INJURY: \"What does the injury look like?\"       Rash, itchy, sore  2. SIZE: \"How large is the cut?\"       Nickel, rash out an inch  3. BLEEDING: \"Is it bleeding now?\" If Yes, ask: \"Is it difficult to stop?\"       No currently   4. LOCATION: \"Where is the injury located?\"       Heal, left   5. ONSET: \"How long ago did the injury occur?\"       A week  6. MECHANISM: \"Tell me how it happened.\"       Not sure, maybe shoes  7. TETANUS: \"When was the last tetanus booster?\"      NA  8. PREGNANCY: \"Is there any chance you are pregnant?\" \"When was your last menstrual period?\"      " NA    Protocols used: Cuts and Naggvckrblo-O-OI    Vicky Ho RN  [facility]

## 2024-04-03 NOTE — TELEPHONE ENCOUNTER
Reason for call:  Other     Patient called regarding (reason for call): prescription    Additional comments: Patient is calling and checking on the status of her medication and is at the pharmacy and the pharmacist is telling patient they have not received this medication for the patient. Please advise and call patient back with updates please and thank you.    Phone number to reach patient:  Cell number on file:    Telephone Information:   Mobile [phone]       Best Time:  any    Can we leave a detailed message on this number?  YES

## 2024-04-03 NOTE — PROGRESS NOTES
"    Lee Robb is a 68 year old, presenting for the following health issues:  Ankle Pain (Left Ankle pain and itchy started yesterday , bumps and blister like bumps, left heal had a cut last week from shoe getting better but thinking it might of cause the itchy rash on ankle )      4/3/2024     3:50 PM   Additional Questions   Roomed by ama barrett cma     Ankle Pain    History of Present Illness       Reason for visit:  Ankle has itchy bumpy rashthat I never had before  Symptom onset:  1-3 days ago  Symptom intensity:  Moderate  Symptom progression:  Staying the same  Had these symptoms before:  No    She eats 2-3 servings of fruits and vegetables daily.She consumes 1 sweetened beverage(s) daily.She exercises with enough effort to increase her heart rate 20 to 29 minutes per day.  She exercises with enough effort to increase her heart rate 3 or less days per week.   She is taking medications regularly.       Rash  Onset/Duration: x 2 days  Description  Location: left ankle  Character: raised, red  Itching: moderate  Intensity:  moderate  Progression of Symptoms:  worsening  Accompanying signs and symptoms:   Fever: No  Body aches or joint pain: No  Sore throat symptoms: No  Recent cold symptoms: No  History:           Previous episodes of similar rash: None  New exposures:  None  Recent travel: No  Exposure to similar rash: No  Precipitating or alleviating factors: cut on back of heel from shoes  Therapies tried and outcome: none        Objective    /73   Pulse 62   Temp 97.9  F (36.6  C)   Resp 16   Ht 1.676 m (5' 6\")   Wt 58.1 kg (128 lb)   SpO2 98%   BMI 20.66 kg/m    Body mass index is 20.66 kg/m .  Physical Exam   GENERAL: alert and no distress  RESP: respirations even, non-labored   SKIN: thickened, raised red rash back of left foot/ankle  NEURO: Normal strength and tone, mentation intact and speech normal  PSYCH: mentation appears normal, affect normal/bright    A/P:  1. Rash  - triamcinolone " (KENALOG) 0.1 % external cream; Apply topically 3 times daily  Dispense: 30 g; Refill: 1    2. Cellulitis of left lower extremity  - cephALEXin (KEFLEX) 500 MG capsule; Take 1 capsule (500 mg) by mouth 3 times daily for 7 days  Dispense: 21 capsule; Refill: 0          Signed Electronically by: Marlene Naylor CNP

## 2024-04-03 NOTE — TELEPHONE ENCOUNTER
Rx verbally given over the phone for Keflex 500 mg capsule to pharmacist at [facility]. Tiff stated she received the rx for the triamcinolone cream already.    ANNETTE ReyesN, RN  [facility]

## 2024-05-13 ENCOUNTER — OFFICE VISIT (OUTPATIENT)
Dept: FAMILY MEDICINE | Facility: CLINIC | Age: 69
End: 2024-05-13
Payer: COMMERCIAL

## 2024-05-13 VITALS
DIASTOLIC BLOOD PRESSURE: 70 MMHG | BODY MASS INDEX: 19.99 KG/M2 | TEMPERATURE: 97.8 F | WEIGHT: 124.4 LBS | HEART RATE: 60 BPM | RESPIRATION RATE: 14 BRPM | SYSTOLIC BLOOD PRESSURE: 126 MMHG | HEIGHT: 66 IN | OXYGEN SATURATION: 99 %

## 2024-05-13 DIAGNOSIS — Z12.31 VISIT FOR SCREENING MAMMOGRAM: ICD-10-CM

## 2024-05-13 DIAGNOSIS — R39.9 UTI SYMPTOMS: ICD-10-CM

## 2024-05-13 DIAGNOSIS — D72.819 LEUKOPENIA, UNSPECIFIED TYPE: ICD-10-CM

## 2024-05-13 DIAGNOSIS — R53.83 FATIGUE, UNSPECIFIED TYPE: ICD-10-CM

## 2024-05-13 DIAGNOSIS — R35.0 URINARY FREQUENCY: ICD-10-CM

## 2024-05-13 DIAGNOSIS — Z00.00 ENCOUNTER FOR MEDICARE ANNUAL WELLNESS EXAM: Primary | ICD-10-CM

## 2024-05-13 DIAGNOSIS — Z13.1 ENCOUNTER FOR SCREENING FOR DIABETES MELLITUS: ICD-10-CM

## 2024-05-13 DIAGNOSIS — Z12.11 SCREEN FOR COLON CANCER: ICD-10-CM

## 2024-05-13 DIAGNOSIS — Z13.220 LIPID SCREENING: ICD-10-CM

## 2024-05-13 DIAGNOSIS — D22.9 NUMEROUS MOLES: ICD-10-CM

## 2024-05-13 LAB
ACANTHOCYTES BLD QL SMEAR: NORMAL
ALBUMIN SERPL BCG-MCNC: 4.4 G/DL (ref 3.5–5.2)
ALBUMIN UR-MCNC: NEGATIVE MG/DL
ALP SERPL-CCNC: 96 U/L (ref 40–150)
ALT SERPL W P-5'-P-CCNC: 13 U/L (ref 0–50)
ANION GAP SERPL CALCULATED.3IONS-SCNC: 10 MMOL/L (ref 7–15)
APPEARANCE UR: CLEAR
AST SERPL W P-5'-P-CCNC: 24 U/L (ref 0–45)
AUER BODIES BLD QL SMEAR: NORMAL
BACTERIA #/AREA URNS HPF: ABNORMAL /HPF
BASO STIPL BLD QL SMEAR: NORMAL
BASOPHILS # BLD AUTO: 0 10E3/UL (ref 0–0.2)
BASOPHILS NFR BLD AUTO: 1 %
BILIRUB SERPL-MCNC: 0.4 MG/DL
BILIRUB UR QL STRIP: NEGATIVE
BITE CELLS BLD QL SMEAR: NORMAL
BLISTER CELLS BLD QL SMEAR: NORMAL
BUN SERPL-MCNC: 12.5 MG/DL (ref 8–23)
BURR CELLS BLD QL SMEAR: NORMAL
CALCIUM SERPL-MCNC: 9.4 MG/DL (ref 8.8–10.2)
CHLORIDE SERPL-SCNC: 102 MMOL/L (ref 98–107)
CHOLEST SERPL-MCNC: 237 MG/DL
COLOR UR AUTO: YELLOW
CREAT SERPL-MCNC: 0.72 MG/DL (ref 0.51–0.95)
DACRYOCYTES BLD QL SMEAR: NORMAL
DEPRECATED HCO3 PLAS-SCNC: 25 MMOL/L (ref 22–29)
EGFRCR SERPLBLD CKD-EPI 2021: >90 ML/MIN/1.73M2
ELLIPTOCYTES BLD QL SMEAR: NORMAL
EOSINOPHIL # BLD AUTO: 0 10E3/UL (ref 0–0.7)
EOSINOPHIL NFR BLD AUTO: 1 %
ERYTHROCYTE [DISTWIDTH] IN BLOOD BY AUTOMATED COUNT: 13.2 % (ref 10–15)
FASTING STATUS PATIENT QL REPORTED: ABNORMAL
FASTING STATUS PATIENT QL REPORTED: ABNORMAL
FRAGMENTS BLD QL SMEAR: NORMAL
GIANT PLATELETS BLD QL SMEAR: NORMAL
GLUCOSE SERPL-MCNC: 100 MG/DL (ref 70–99)
GLUCOSE UR STRIP-MCNC: NEGATIVE MG/DL
HBA1C MFR BLD: 4.8 % (ref 0–5.6)
HCT VFR BLD AUTO: 37.8 % (ref 35–47)
HDLC SERPL-MCNC: 63 MG/DL
HGB BLD-MCNC: 12.3 G/DL (ref 11.7–15.7)
HGB C CRYSTALS: NORMAL
HGB UR QL STRIP: ABNORMAL
HOWELL-JOLLY BOD BLD QL SMEAR: NORMAL
IMM GRANULOCYTES # BLD: 0 10E3/UL
IMM GRANULOCYTES NFR BLD: 0 %
KETONES UR STRIP-MCNC: NEGATIVE MG/DL
LDLC SERPL CALC-MCNC: 152 MG/DL
LEUKOCYTE ESTERASE UR QL STRIP: NEGATIVE
LYMPHOCYTES # BLD AUTO: 1.1 10E3/UL (ref 0.8–5.3)
LYMPHOCYTES NFR BLD AUTO: 69 %
MCH RBC QN AUTO: 28 PG (ref 26.5–33)
MCHC RBC AUTO-ENTMCNC: 32.5 G/DL (ref 31.5–36.5)
MCV RBC AUTO: 86 FL (ref 78–100)
MONOCYTES # BLD AUTO: 0.3 10E3/UL (ref 0–1.3)
MONOCYTES NFR BLD AUTO: 19 %
NEUTROPHILS # BLD AUTO: 0.2 10E3/UL (ref 1.6–8.3)
NEUTROPHILS NFR BLD AUTO: 10 %
NEUTS HYPERSEG BLD QL SMEAR: NORMAL
NITRATE UR QL: NEGATIVE
NONHDLC SERPL-MCNC: 174 MG/DL
NRBC # BLD AUTO: 0 10E3/UL
NRBC BLD AUTO-RTO: 0 /100
PATH REV: NORMAL
PH UR STRIP: 7 [PH] (ref 5–8)
PLAT MORPH BLD: NORMAL
PLATELET # BLD AUTO: 128 10E3/UL (ref 150–450)
POLYCHROMASIA BLD QL SMEAR: NORMAL
POTASSIUM SERPL-SCNC: 4.4 MMOL/L (ref 3.4–5.3)
PROT SERPL-MCNC: 7.5 G/DL (ref 6.4–8.3)
RBC # BLD AUTO: 4.4 10E6/UL (ref 3.8–5.2)
RBC #/AREA URNS AUTO: ABNORMAL /HPF
RBC AGGLUT BLD QL: NORMAL
RBC MORPH BLD: NORMAL
ROULEAUX BLD QL SMEAR: NORMAL
SICKLE CELLS BLD QL SMEAR: NORMAL
SMUDGE CELLS BLD QL SMEAR: NORMAL
SODIUM SERPL-SCNC: 137 MMOL/L (ref 135–145)
SP GR UR STRIP: 1.01 (ref 1–1.03)
SPHEROCYTES BLD QL SMEAR: NORMAL
SQUAMOUS #/AREA URNS AUTO: ABNORMAL /LPF
STOMATOCYTES BLD QL SMEAR: NORMAL
TARGETS BLD QL SMEAR: NORMAL
TOXIC GRANULES BLD QL SMEAR: NORMAL
TRIGL SERPL-MCNC: 112 MG/DL
TSH SERPL DL<=0.005 MIU/L-ACNC: 2.25 UIU/ML (ref 0.3–4.2)
UROBILINOGEN UR STRIP-ACNC: 0.2 E.U./DL
VARIANT LYMPHS BLD QL SMEAR: NORMAL
WBC # BLD AUTO: 1.7 10E3/UL (ref 4–11)
WBC #/AREA URNS AUTO: ABNORMAL /HPF

## 2024-05-13 PROCEDURE — G0438 PPPS, INITIAL VISIT: HCPCS | Performed by: NURSE PRACTITIONER

## 2024-05-13 PROCEDURE — 83036 HEMOGLOBIN GLYCOSYLATED A1C: CPT | Performed by: NURSE PRACTITIONER

## 2024-05-13 PROCEDURE — 36415 COLL VENOUS BLD VENIPUNCTURE: CPT | Performed by: NURSE PRACTITIONER

## 2024-05-13 PROCEDURE — 84443 ASSAY THYROID STIM HORMONE: CPT | Performed by: NURSE PRACTITIONER

## 2024-05-13 PROCEDURE — 80053 COMPREHEN METABOLIC PANEL: CPT | Performed by: NURSE PRACTITIONER

## 2024-05-13 PROCEDURE — 80061 LIPID PANEL: CPT | Performed by: NURSE PRACTITIONER

## 2024-05-13 PROCEDURE — 81001 URINALYSIS AUTO W/SCOPE: CPT | Performed by: NURSE PRACTITIONER

## 2024-05-13 PROCEDURE — 85025 COMPLETE CBC W/AUTO DIFF WBC: CPT | Performed by: NURSE PRACTITIONER

## 2024-05-13 PROCEDURE — 99213 OFFICE O/P EST LOW 20 MIN: CPT | Mod: 25 | Performed by: NURSE PRACTITIONER

## 2024-05-13 RX ORDER — RESPIRATORY SYNCYTIAL VIRUS VACCINE 120MCG/0.5
0.5 KIT INTRAMUSCULAR ONCE
Qty: 1 EACH | Refills: 0 | Status: CANCELLED | OUTPATIENT
Start: 2024-05-13 | End: 2024-05-13

## 2024-05-13 SDOH — HEALTH STABILITY: PHYSICAL HEALTH: ON AVERAGE, HOW MANY DAYS PER WEEK DO YOU ENGAGE IN MODERATE TO STRENUOUS EXERCISE (LIKE A BRISK WALK)?: 3 DAYS

## 2024-05-13 SDOH — HEALTH STABILITY: PHYSICAL HEALTH: ON AVERAGE, HOW MANY MINUTES DO YOU ENGAGE IN EXERCISE AT THIS LEVEL?: 40 MIN

## 2024-05-13 ASSESSMENT — ANXIETY QUESTIONNAIRES
GAD7 TOTAL SCORE: 0
3. WORRYING TOO MUCH ABOUT DIFFERENT THINGS: NOT AT ALL
7. FEELING AFRAID AS IF SOMETHING AWFUL MIGHT HAPPEN: NOT AT ALL
1. FEELING NERVOUS, ANXIOUS, OR ON EDGE: NOT AT ALL
7. FEELING AFRAID AS IF SOMETHING AWFUL MIGHT HAPPEN: NOT AT ALL
6. BECOMING EASILY ANNOYED OR IRRITABLE: NOT AT ALL
4. TROUBLE RELAXING: NOT AT ALL
2. NOT BEING ABLE TO STOP OR CONTROL WORRYING: NOT AT ALL
5. BEING SO RESTLESS THAT IT IS HARD TO SIT STILL: NOT AT ALL

## 2024-05-13 ASSESSMENT — PATIENT HEALTH QUESTIONNAIRE - PHQ9
10. IF YOU CHECKED OFF ANY PROBLEMS, HOW DIFFICULT HAVE THESE PROBLEMS MADE IT FOR YOU TO DO YOUR WORK, TAKE CARE OF THINGS AT HOME, OR GET ALONG WITH OTHER PEOPLE: SOMEWHAT DIFFICULT
SUM OF ALL RESPONSES TO PHQ QUESTIONS 1-9: 4
SUM OF ALL RESPONSES TO PHQ QUESTIONS 1-9: 4

## 2024-05-13 ASSESSMENT — SOCIAL DETERMINANTS OF HEALTH (SDOH): HOW OFTEN DO YOU GET TOGETHER WITH FRIENDS OR RELATIVES?: ONCE A WEEK

## 2024-05-13 ASSESSMENT — PAIN SCALES - GENERAL: PAINLEVEL: MILD PAIN (2)

## 2024-05-13 NOTE — PROGRESS NOTES
Preventive Care Visit  [facility]  Marlene Naylor CNP, Nurse Practitioner Primary Care  May 13, 2024        Lee Robb is a 68 year old, presenting for the following:  Physical and Urinary Problem (Possible UTI, Urgency, frequency had pain at first. /Fatigue for 2 weeks now.)        5/13/2024    12:36 PM   Additional Questions   [facility]         Health Care Directive  Patient does not have a Health Care Directive or Living Will: Discussed advance care planning with patient; however, patient declined at this time.    HPI  Urinary frequency since last night.  Fatigue x 2-3 weeks.  Sleeping okay but feels exhausted.            5/13/2024   General Health   How would you rate your overall physical health? Good   Feel stress (tense, anxious, or unable to sleep) Only a little   (!) STRESS CONCERN      5/13/2024   Nutrition   Diet: Regular (no restrictions)         5/13/2024   Exercise   Days per week of moderate/strenous exercise 3 days   Average minutes spent exercising at this level 40 min         5/13/2024   Social Factors   Frequency of gathering with friends or relatives Once a week   Worry food won't last until get money to buy more No   Food not last or not have enough money for food? No   Do you have housing?  Yes   Are you worried about losing your housing? No   Lack of transportation? No   Unable to get utilities (heat,electricity)? No         5/13/2024   Fall Risk   Fallen 2 or more times in the past year? No   Trouble with walking or balance? No          5/13/2024   Activities of Daily Living- Home Safety   Needs help with the following daily activites None of the above   Safety concerns in the home None of the above         5/13/2024   Dental   Dentist two times every year? Yes         5/13/2024   Hearing Screening   Hearing concerns? None of the above         5/13/2024   Driving Risk Screening   Patient/family members have concerns about driving No          5/13/2024   General Alertness/Fatigue Screening   Have you been more tired than usual lately? (!) YES         5/13/2024   Urinary Incontinence Screening   Bothered by leaking urine in past 6 months No         5/13/2024   TB Screening   Were you born outside of the [place]? Yes       Today's PHQ-9 Score:       5/13/2024    11:46 AM   PHQ-9 SCORE   PHQ-9 Total Score MyChart 4 (Minimal depression)   PHQ-9 Total Score 4         5/13/2024   Substance Use   Alcohol more than 3/day or more than 7/wk No   Do you have a current opioid prescription? No   How severe/bad is pain from 1 to 10? 5/10   Do you use any other substances recreationally? No     Social History     Tobacco Use    Smoking status: Never     Passive exposure: Never    Smokeless tobacco: Never   Vaping Use    Vaping status: Never Used   Substance Use Topics    Alcohol use: Not Currently    Drug use: No           3/13/2023   LAST FHS-7 RESULTS   1st degree relative breast or ovarian cancer No   Any relative bilateral breast cancer No   Any male have breast cancer No   Any ONE woman have BOTH breast AND ovarian cancer No   Any woman with breast cancer before 50yrs No   2 or more relatives with breast AND/OR ovarian cancer No   2 or more relatives with breast AND/OR bowel cancer No        Mammogram Screening - Mammogram every 1-2 years updated in Health Maintenance based on mutual decision making    ASCVD Risk   The 10-year ASCVD risk score (Ar YATES, et al., 2019) is: 6.7%    Values used to calculate the score:      Age: 68 years      Sex: Female      Is Non- : No      Diabetic: No      Tobacco smoker: No      Systolic Blood Pressure: 126 mmHg      Is BP treated: No      HDL Cholesterol: 69 mg/dL      Total Cholesterol: 240 mg/dL          Reviewed and updated as needed this visit by Provider                    Past Medical History:   Diagnosis Date    Arthritis     Atrial fibrillation (H)     Diagnosed 1/2/14.  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    Edema     due to arthritis    Essential Hypercholesterolemia     Dx July 1999       Facial cellulitis 06/03/2018    Foot pain, left 09/10/2015    Ganglion cyst of left foot 03/08/2023    Added automatically from request for surgery [id number]    Herpes simplex     type 1    History of transfusion     Hyperlipidemia     Insufficiency fracture of tibia 11/20/2015    Right ankle insufficiency fracture    Motion sickness     PONV (postoperative nausea and vomiting)     Rheumatoid arthritis (H)     Rheumatoid arthritis (H)     Sinus bradycardia     Ureteral stone     Left    Vitamin D deficiency      Past Surgical History:   Procedure Laterality Date    BIOPSY BREAST Right 09/26/2007    Biopsy Breast Percutaneous Needle Core 7;  Comments: Benign results    CARDIAC ELECTROPHYSIOLOGY MAPPING AND ABLATION  12/17/2014    Pulmonary vein isolation for AF    CARDIAC SURGERY  2012    cardiac ablation    CERVIX LESION DESTRUCTION  06/01/1990    for ARAM I    DILATION AND CURETTAGE      for menorrhagia, Hgb 5.7    EXCISE GANGLION FOOT Left 03/20/2023    Procedure: EXCISION, GANGLION CYST, left foot;  Surgeon: Trenton Acevedo DPM;  Location: [facility]    HYSTERECTOMY VAGINAL  07/25/2007    With A/P repair and enterocele repair for benign reasons    LAPAROSCOPIC HYSTERECTOMY TOTAL       Current providers sharing in care for this patient include:  Patient Care Team:  Scarlet Zuniga MD as PCP - General  Scarlet Zuniga MD as Assigned PCP  Trenton Acevedo DPM as Assigned Surgical Provider    The following health maintenance items are reviewed in Epic and correct as of  "today:  Health Maintenance   Topic Date Due    COLORECTAL CANCER SCREENING  Never done    ZOSTER IMMUNIZATION (1 of 2) Never done    RSV VACCINE (Pregnancy & 60+) (1 - 1-dose 60+ series) Never done    MAMMO SCREENING  06/27/2018    DTAP/TDAP/TD IMMUNIZATION (2 - Td or Tdap) 06/23/2021    MEDICARE ANNUAL WELLNESS VISIT  Never done    Pneumococcal Vaccine: 65+ Years (1 of 1 - PCV) Never done    COVID-19 Vaccine (5 - 2023-24 season) 09/01/2023    ANNUAL REVIEW OF HM ORDERS  10/05/2023    GLUCOSE  11/07/2023    INFLUENZA VACCINE (Season Ended) 09/01/2024    FALL RISK ASSESSMENT  05/13/2025    LIPID  01/24/2028    ADVANCE CARE PLANNING  03/16/2028    DEXA  01/28/2031    HEPATITIS C SCREENING  Completed    PHQ-2 (once per calendar year)  Completed    IPV IMMUNIZATION  Aged Out    HPV IMMUNIZATION  Aged Out    MENINGITIS IMMUNIZATION  Aged Out    RSV MONOCLONAL ANTIBODY  Aged Out         Review of Systems  Constitutional, HEENT, cardiovascular, pulmonary, GI, , musculoskeletal, neuro, skin, endocrine and psych systems are negative, except as otherwise noted.     Objective    Exam  /70 (BP Location: Left arm, Patient Position: Sitting, Cuff Size: Adult Small)   Pulse 60   Temp 97.8  F (36.6  C) (Oral)   Resp 14   Ht 1.676 m (5' 6\")   Wt 56.4 kg (124 lb 6.4 oz)   SpO2 99%   Breastfeeding No   BMI 20.08 kg/m     Estimated body mass index is 20.08 kg/m  as calculated from the following:    Height as of this encounter: 1.676 m (5' 6\").    Weight as of this encounter: 56.4 kg (124 lb 6.4 oz).    Physical Exam  GENERAL: alert and no distress  EYES: Eyes grossly normal to inspection, PERRL and conjunctivae and sclerae normal  HENT: ear canals and TM's normal, nose and mouth without ulcers or lesions  NECK: no adenopathy, no asymmetry, masses, or scars  RESP: lungs clear to auscultation - no rales, rhonchi or wheezes  BREAST: normal without masses, tenderness or nipple discharge and no palpable axillary masses or " adenopathy  CV: regular rate and rhythm, normal S1 S2, no S3 or S4, no murmur, click or rub, no peripheral edema  ABDOMEN: soft, nontender, no hepatosplenomegaly, no masses and bowel sounds normal  MS: no gross musculoskeletal defects noted, no edema  SKIN: no rashes, numerous moles  NEURO: Normal strength and tone, mentation intact and speech normal  PSYCH: mentation appears normal, affect normal/bright        5/13/2024   Mini Cog   Clock Draw Score 2 Normal   3 Item Recall 3 objects recalled   Mini Cog Total Score 5          A/P:  1. Encounter for Medicare annual wellness exam      2. UTI symptoms  - UA Macroscopic with reflex to Microscopic and Culture - Lab Collect; Future  - UA Macroscopic with reflex to Microscopic and Culture - Lab Collect  - UA Microscopic with Reflex to Culture    3. Screen for colon cancer  - Colonoscopy Screening  Referral; Future    4. Visit for screening mammogram  - MA Screen Bilateral w/Renato; Future    5. Lipid screening  - Lipid panel reflex to direct LDL Non-fasting; Future  - Lipid panel reflex to direct LDL Non-fasting    6. Fatigue, unspecified type  - Comprehensive metabolic panel (BMP + Alb, Alk Phos, ALT, AST, Total. Bili, TP); Future  - TSH with free T4 reflex; Future  - CBC with platelets and differential; Future  - Hemoglobin A1c; Future  - Comprehensive metabolic panel (BMP + Alb, Alk Phos, ALT, AST, Total. Bili, TP)  - TSH with free T4 reflex  - CBC with platelets and differential  - Hemoglobin A1c    7. Urinary frequency  - Hemoglobin A1c; Future  - Hemoglobin A1c    8. Encounter for screening for diabetes mellitus  - Hemoglobin A1c; Future  - Hemoglobin A1c    9. Numerous moles  - Adult Dermatology  Referral; Future    Addendum:  WBC is 1.6.  Referral placed for hematology consult.     Signed Electronically by: Marlene Naylor CNP    Answers submitted by the patient for this visit:  Patient Health Questionnaire (Submitted on 5/13/2024)  If you checked  off any problems, how difficult have these problems made it for you to do your work, take care of things at home, or get along with other people?: Somewhat difficult  PHQ9 TOTAL SCORE: 4  SINDY-7 (Submitted on 5/13/2024)  SINDY 7 TOTAL SCORE: 0

## 2024-05-13 NOTE — PATIENT INSTRUCTIONS
"Preventive Care Advice   This is general advice we often give to help people stay healthy. Your care team may have specific advice just for you. Please talk to your care team about your own preventive care needs.  Lifestyle  Exercise at least 150 minutes each week (30 minutes a day, 5 days a week).  Do muscle strengthening activities 2 days a week. These help control your weight and prevent disease.  No smoking.  Wear sunscreen to prevent skin cancer.  Have your home tested for radon every 2 to 5 years. Radon is a colorless, odorless gas that can harm your lungs. To learn more, go to www.health.[facility].mn. and search for \"Radon in Homes.\"  Keep guns unloaded and locked up in a safe place like a safe or gun vault, or, use a gun lock and hide the keys. Always lock away bullets separately. To learn more, visit [employer].[place].gov and search for \"safe gun storage.\"  Nutrition  Eat 5 or more servings of fruits and vegetables each day.  Try wheat bread, brown rice and whole grain pasta (instead of white bread, rice, and pasta).  Get enough calcium and vitamin D. Check the label on foods and aim for 100% of the RDA (recommended daily allowance).  Regular exams  Have a dental exam and cleaning every 6 months.  See your health care team every year to talk about:  Any changes in your health.  Any medicines your care team has prescribed.  Preventive care, family planning, and ways to prevent chronic diseases.  Shots (vaccines)   HPV shots (up to age 26), if you've never had them before.  Hepatitis B shots (up to age 59), if you've never had them before.  COVID-19 shot: Get this shot when it's due.  Flu shot: Get a flu shot every year.  Tetanus shot: Get a tetanus shot every 10 years.  Pneumococcal, hepatitis A, and RSV shots: Ask your care team if you need these based on your risk.  Shingles shot (for age 50 and up).  General health tests  Diabetes screening:  Starting at age 35, Get screened for diabetes at least every 3 years.  If " you are younger than age 35, ask your care team if you should be screened for diabetes.  Cholesterol test: At age 39, start having a cholesterol test every 5 years, or more often if advised.  Bone density scan (DEXA): At age 50, ask your care team if you should have this scan for osteoporosis (brittle bones).  Hepatitis C: Get tested at least once in your life.  Abdominal aortic aneurysm screening: Talk to your doctor about having this screening if you:  Have ever smoked; and  Are biologically male; and  Are between the ages of 65 and 75.  STIs (sexually transmitted infections)  Before age 24: Ask your care team if you should be screened for STIs.  After age 24: Get screened for STIs if you're at risk. You are at risk for STIs (including HIV) if:  You are sexually active with more than one person.  You don't use condoms every time.  You or a partner was diagnosed with a sexually transmitted infection.  If you are at risk for HIV, ask about PrEP medicine to prevent HIV.  Get tested for HIV at least once in your life, whether you are at risk for HIV or not.  Cancer screening tests  Cervical cancer screening: If you have a cervix, begin getting regular cervical cancer screening tests at age 21. Most people who have regular screenings with normal results can stop after age 65. Talk about this with your provider.  Breast cancer scan (mammogram): If you've ever had breasts, begin having regular mammograms starting at age 40. This is a scan to check for breast cancer.  Colon cancer screening: It is important to start screening for colon cancer at age 45.  Have a colonoscopy test every 10 years (or more often if you're at risk) Or, ask your provider about stool tests like a FIT test every year or Cologuard test every 3 years.  To learn more about your testing options, visit: www.[employer]/667588.pdf.  For help making a decision, visit: marcos/or27592.  Prostate cancer screening test: If you have a prostate and are age 55  to 69, ask your provider if you would benefit from a yearly prostate cancer screening test.  Lung cancer screening: If you are a current or former smoker age 50 to 80, ask your care team if ongoing lung cancer screenings are right for you.  For informational purposes only. Not to replace the advice of your health care provider. Copyright   2023 [employer]. All rights reserved. Clinically reviewed by the [facility] Transitions Program. ZANY OX 174607 - REV 04/24.

## 2024-05-14 ENCOUNTER — PATIENT OUTREACH (OUTPATIENT)
Dept: ONCOLOGY | Facility: CLINIC | Age: 69
End: 2024-05-14
Payer: COMMERCIAL

## 2024-05-14 NOTE — PROGRESS NOTES
Hematology referral reviewed for [facility], see below.    Referral reason: referral received from primary care for low white count and also with new low platelets, leukopenia with absolute neutropenia    Clinical question entered by referring provider or through order transcription: WBC 1.6     Referral received via: Internal referral by [facility]    Current abnormal labs: Available in Chart Review    Outreach: Referral discussed with patient.    Plan: Triage instructions updated and sent to NPS for completion, call transferred to NPS

## 2024-05-15 ENCOUNTER — TELEPHONE (OUTPATIENT)
Dept: FAMILY MEDICINE | Facility: CLINIC | Age: 69
End: 2024-05-15
Payer: COMMERCIAL

## 2024-05-15 NOTE — TELEPHONE ENCOUNTER
Spoke to patient and relayed message from provider.  Patient verbalized understanding and agrees with plan.  She did call the hematologist and is not able to get in for 6 weeks.  Requesting referral to be changed to urgent.      ANNETTE HopsonN RN  [facility]

## 2024-05-15 NOTE — TELEPHONE ENCOUNTER
Referral was placed as urgent.  Hematology department reviews cases and schedules as they deem appropriate.

## 2024-05-15 NOTE — TELEPHONE ENCOUNTER
----- Message from Marlene Naylor CNP sent at 5/15/2024  7:40 AM CDT -----  Patient hasn't viewed [employer] results message- please call with results and recommendations.

## 2024-05-15 NOTE — TELEPHONE ENCOUNTER
Spoke to patient and relayed message from provider.  Patient verbalized understanding and agrees with plan.    ANNETTE HopsonN RN  [facility]

## 2024-05-24 NOTE — TELEPHONE ENCOUNTER
RECORDS STATUS - ALL OTHER DIAGNOSIS      RECORDS RECEIVED FROM: [facility]   NOTES STATUS DETAILS   OFFICE NOTE from referring provider Epic 05/13/24: Marlene Naylor CNP   MEDICATION LIST [facility]    LABS     PATHOLOGY REPORTS     ANYTHING RELATED TO DIAGNOSIS Epic Most recent 05/13/24

## 2024-06-26 ENCOUNTER — PRE VISIT (OUTPATIENT)
Dept: ONCOLOGY | Facility: HOSPITAL | Age: 69
End: 2024-06-26
Payer: COMMERCIAL

## 2024-07-09 ENCOUNTER — DOCUMENTATION ONLY (OUTPATIENT)
Dept: ONCOLOGY | Facility: HOSPITAL | Age: 69
End: 2024-07-09

## 2024-07-09 ENCOUNTER — LAB (OUTPATIENT)
Dept: INFUSION THERAPY | Facility: HOSPITAL | Age: 69
End: 2024-07-09
Attending: INTERNAL MEDICINE
Payer: COMMERCIAL

## 2024-07-09 ENCOUNTER — ONCOLOGY VISIT (OUTPATIENT)
Dept: ONCOLOGY | Facility: HOSPITAL | Age: 69
End: 2024-07-09
Attending: INTERNAL MEDICINE
Payer: COMMERCIAL

## 2024-07-09 VITALS
RESPIRATION RATE: 16 BRPM | OXYGEN SATURATION: 98 % | WEIGHT: 121 LBS | DIASTOLIC BLOOD PRESSURE: 87 MMHG | HEIGHT: 66 IN | SYSTOLIC BLOOD PRESSURE: 125 MMHG | BODY MASS INDEX: 19.44 KG/M2 | HEART RATE: 64 BPM

## 2024-07-09 DIAGNOSIS — D72.819 LEUKOPENIA, UNSPECIFIED TYPE: Primary | ICD-10-CM

## 2024-07-09 LAB
BASOPHILS # BLD AUTO: 0 10E3/UL (ref 0–0.2)
BASOPHILS NFR BLD AUTO: 1 %
CRP SERPL-MCNC: <3 MG/L
EOSINOPHIL # BLD AUTO: 0 10E3/UL (ref 0–0.7)
EOSINOPHIL NFR BLD AUTO: 1 %
ERYTHROCYTE [DISTWIDTH] IN BLOOD BY AUTOMATED COUNT: 13.1 % (ref 10–15)
ERYTHROCYTE [SEDIMENTATION RATE] IN BLOOD BY WESTERGREN METHOD: 10 MM/HR (ref 0–30)
HCT VFR BLD AUTO: 38.6 % (ref 35–47)
HGB BLD-MCNC: 12.7 G/DL (ref 11.7–15.7)
IMM GRANULOCYTES # BLD: 0 10E3/UL
IMM GRANULOCYTES NFR BLD: 0 %
LYMPHOCYTES # BLD AUTO: 1.6 10E3/UL (ref 0.8–5.3)
LYMPHOCYTES NFR BLD AUTO: 72 %
MCH RBC QN AUTO: 28 PG (ref 26.5–33)
MCHC RBC AUTO-ENTMCNC: 32.9 G/DL (ref 31.5–36.5)
MCV RBC AUTO: 85 FL (ref 78–100)
MONOCYTES # BLD AUTO: 0.3 10E3/UL (ref 0–1.3)
MONOCYTES NFR BLD AUTO: 14 %
NEUTROPHILS # BLD AUTO: 0.3 10E3/UL (ref 1.6–8.3)
NEUTROPHILS NFR BLD AUTO: 12 %
NRBC # BLD AUTO: 0 10E3/UL
NRBC BLD AUTO-RTO: 0 /100
PLAT MORPH BLD: NORMAL
PLATELET # BLD AUTO: 135 10E3/UL (ref 150–450)
RBC # BLD AUTO: 4.53 10E6/UL (ref 3.8–5.2)
RBC MORPH BLD: NORMAL
WBC # BLD AUTO: 2.3 10E3/UL (ref 4–11)

## 2024-07-09 PROCEDURE — 86140 C-REACTIVE PROTEIN: CPT | Performed by: INTERNAL MEDICINE

## 2024-07-09 PROCEDURE — 36415 COLL VENOUS BLD VENIPUNCTURE: CPT | Performed by: INTERNAL MEDICINE

## 2024-07-09 PROCEDURE — 86431 RHEUMATOID FACTOR QUANT: CPT | Performed by: INTERNAL MEDICINE

## 2024-07-09 PROCEDURE — 85025 COMPLETE CBC W/AUTO DIFF WBC: CPT | Performed by: INTERNAL MEDICINE

## 2024-07-09 PROCEDURE — 85652 RBC SED RATE AUTOMATED: CPT | Performed by: INTERNAL MEDICINE

## 2024-07-09 PROCEDURE — G0463 HOSPITAL OUTPT CLINIC VISIT: HCPCS | Performed by: INTERNAL MEDICINE

## 2024-07-09 PROCEDURE — 99204 OFFICE O/P NEW MOD 45 MIN: CPT | Performed by: INTERNAL MEDICINE

## 2024-07-09 RX ORDER — ACETAMINOPHEN 325 MG/1
325-650 TABLET ORAL EVERY 6 HOURS PRN
COMMUNITY

## 2024-07-09 RX ORDER — IBUPROFEN 200 MG
200 TABLET ORAL EVERY 4 HOURS PRN
COMMUNITY

## 2024-07-09 ASSESSMENT — PAIN SCALES - GENERAL: PAINLEVEL: MODERATE PAIN (4)

## 2024-07-09 NOTE — LETTER
"7/9/2024      Clarisse Dubon  [address] [address] [facility] [address]      Dear Colleague,    Thank you for referring your patient, Clarisse Dubon, to the [facility]. Please see a copy of my visit note below.    Oncology Rooming Note    July 9, 2024 1:47 PM   Clarisse Dubon is a 68 year old female who presents for:    Chief Complaint   Patient presents with     Hematology     New consult Neutropenia and Thrombocytopenia     Initial Vitals: /87   Pulse 64   Resp 16   Ht 1.676 m (5' 6\")   Wt 54.9 kg (121 lb)   SpO2 98%   BMI 19.53 kg/m   Estimated body mass index is 19.53 kg/m  as calculated from the following:    Height as of this encounter: 1.676 m (5' 6\").    Weight as of this encounter: 54.9 kg (121 lb). Body surface area is 1.6 meters squared.  Moderate Pain (4) Comment: Data Unavailable   No LMP recorded. Patient is postmenopausal.  Allergies reviewed: Yes  Medications reviewed: Yes    Medications: Medication refills not needed today.  Pharmacy name entered into [employer]: [id number] [place], [place] - [address] AT [facility] [address]    Frailty Screening:   Is the patient here for a new oncology consult visit in cancer care? 1. Yes. Over the past month, have you experienced difficulty or required a caregiver to assist with:   1. Balance, walking or general mobility (including any falls)? NO  2. Completion of self-care tasks such as bathing, dressing, toileting, grooming/hygiene?  NO  3. Concentration or memory that affects your daily life?  NO       Clinical concerns:  new consult Neutropenia and Thrombocytopenia       Laine Rubio              [facility] Hematology and Oncology Consult Note    Patient: Clarisse Dubon  MRN: [id number]  Date of Service: Jul 9, 2024       Reason for Visit    Chief Complaint   Patient presents with     Hematology     New consult Neutropenia and Thrombocytopenia             ECOG " Performance 0 - Independent        _____________________________________________________________________________    History Of Present Illness    Ms. Clarisse Dubon is a very pleasant 68 year old female who has been referred to me for evaluation of leukopenia.  She was in her doctor's office recently and was noted to have a low white count.  Her total white count was 1.7 but her neutrophil count was only 200 as far back as January 2023 her total white count was low at 2.7 and her ANC was 800.  It is interesting to note that she gives us a history of rheumatoid arthritis for which she was treated with methotrexate for 4 years.  She finished methotrexate in 2019 and there is a CBC from 2020 that showed that her counts were normal at that time.  She also reports that for the last 6 months or so her symptoms of rheumatoid arthritis are starting to come back.  She wakes up much more stiffer than usual she has to take ibuprofen once or twice a day on a regular basis to help with symptoms.  She has been trying to get in to see her rheumatologist however she has an appointment in the first week of January.  She otherwise reports that she continues to be active she denies any nausea vomiting denies any recent infections or febrile illnesses.  She also denies any recent platelet from infections        Review of systems.  Apart from describing in history, the remainder of comprehensive ROS was negative.    Past History    Past Medical History:   Diagnosis Date     Arthritis      Atrial fibrillation (H)     Diagnosed 1/2/14.  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     Edema     due to arthritis      Essential Hypercholesterolemia     Dx July 1999        Facial cellulitis 06/03/2018     Foot pain, left 09/10/2015     Ganglion cyst of left foot 03/08/2023    Added automatically from request for surgery [id number]     Herpes simplex     type 1     History of transfusion      Hyperlipidemia      Insufficiency fracture of tibia 11/20/2015    Right ankle insufficiency fracture     Motion sickness      PONV (postoperative nausea and vomiting)      Rheumatoid arthritis (H)      Rheumatoid arthritis (H)      Sinus bradycardia      Ureteral stone     Left     Vitamin D deficiency      Past Surgical History:   Procedure Laterality Date     BIOPSY BREAST Right 09/26/2007    Biopsy Breast Percutaneous Needle Core 7;  Comments: Benign results     CARDIAC ELECTROPHYSIOLOGY MAPPING AND ABLATION  12/17/2014    Pulmonary vein isolation for AF     CARDIAC SURGERY  2012    cardiac ablation     CERVIX LESION DESTRUCTION  06/01/1990    for ARAM I     DILATION AND CURETTAGE      for menorrhagia, Hgb 5.7     EXCISE GANGLION FOOT Left 03/20/2023    Procedure: EXCISION, GANGLION CYST, left foot;  Surgeon: Trenton Acevedo DPM;  Location: [facility]     HYSTERECTOMY VAGINAL  07/25/2007    With A/P repair and enterocele repair for benign reasons     LAPAROSCOPIC HYSTERECTOMY TOTAL       Family History   Problem Relation Age of Onset     Heart Disease Mother      Diabetes Type 2  Mother      Hypertension Mother      Coronary Artery Disease Father      Heart Disease Father      Diabetes Type 2  Father      Hypertension Father      Atrial fibrillation Sister      Hypertension Brother      Coronary Artery Disease Brother      Leukemia Brother      Heart Disease Brother      Cerebrovascular Disease Sister      Lupus Sister      Colon Cancer Sister      Social History     Socioeconomic History     Marital status:    Tobacco Use     Smoking status: Never     Passive exposure: Never     Smokeless tobacco: Never   Vaping Use      Vaping status: Never Used   Substance and Sexual Activity     Alcohol use: Not Currently     Drug use: No     Sexual activity: Yes     Partners: Male     Birth control/protection: None   Other Topics Concern     Parent/sibling w/ CABG, MI or angioplasty before 65F 55M? Yes     Social Determinants of Health     Financial Resource Strain: Low Risk  (5/13/2024)    Financial Resource Strain      Within the past 12 months, have you or your family members you live with been unable to get utilities (heat, electricity) when it was really needed?: No   Food Insecurity: Low Risk  (5/13/2024)    Food Insecurity      Within the past 12 months, did you worry that your food would run out before you got money to buy more?: No      Within the past 12 months, did the food you bought just not last and you didn t have money to get more?: No   Transportation Needs: Low Risk  (5/13/2024)    Transportation Needs      Within the past 12 months, has lack of transportation kept you from medical appointments, getting your medicines, non-medical meetings or appointments, work, or from getting things that you need?: No   Physical Activity: Insufficiently Active (5/13/2024)    Exercise Vital Sign      Days of Exercise per Week: 3 days      Minutes of Exercise per Session: 40 min   Stress: No Stress Concern Present (5/13/2024)    [place] [employer] - Occupational Stress Questionnaire      Feeling of Stress : Only a little   Social Connections: Unknown (5/13/2024)    Social Connection and Isolation Panel [NHANES]      Frequency of Social Gatherings with Friends and Family: Once a week   Interpersonal Safety: Low Risk  (5/13/2024)    Interpersonal Safety      Do you feel physically and emotionally safe where you currently live?: Yes      Within the past 12 months, have you been hit, slapped, kicked or otherwise physically hurt by someone?: No      Within the past 12 months, have you been humiliated or emotionally abused in  "other ways by your partner or ex-partner?: No   Housing Stability: Low Risk  (5/13/2024)    Housing Stability      Do you have housing? : Yes      Are you worried about losing your housing?: No       Allergies    No Known Allergies    Physical Exam    /87   Pulse 64   Resp 16   Ht 1.676 m (5' 6\")   Wt 54.9 kg (121 lb)   SpO2 98%   BMI 19.53 kg/m      General: alert, awake, not in acute distress  HEENT: Head: Normal, normocephalic, atraumatic.  Eye: Normal external eye, conjunctiva, lids cornea, MIKALA.  Nose: Normal external nose, mucus membranes and septum.  Pharynx: Normal buccal mucosa. Normal pharynx.  Neck / Thyroid: Supple, no masses, nodes, nodules or enlargement.  Lymphatics: No abnormally enlarged lymph nodes.  Chest: Normal chest wall and respirations. Clear to auscultation.  Heart: S1 S2 RRR, no murmur.   Abdomen: abdomen is soft without significant tenderness, masses, organomegaly or guarding  Extremities: normal strength, tone, and muscle mass  Skin: normal. no rash or abnormalities  CNS: non focal.    Lab Results    No results found for this or any previous visit (from the past 168 hour(s)).    Imaging Results    No results found.        Assessment/Plan    Neutropenia and mild thrombocytopenia  Patient has been sent for evaluation for leukopenia and her most recent CBC showed a white count of 1700 however she only had 10% neutrophils and her ANC was calculated at 200 it is also important to note that as far back as January 2023 she had an ANC of 800.  My suspicion is that her decreased count is related to reactivation of her rheumatoid arthritis for which she has not been treated for many years.  I think that the likelihood of having a hematologic malignancy is low.  I would repeat some labs today including a CBC ESR 6, C-reactive protein, rheumatoid factor today and make decisions on further intervention based on the results.  I also encouraged her that she should try to get into see her " rheumatologist as soon as possible.  If her neutrophil count continues to be daily no we will consider doing a bone marrow aspirate and biopsy          Signed by: Casi Webber MD       Again, thank you for allowing me to participate in the care of your patient.        Sincerely,        Casi Webber MD

## 2024-07-09 NOTE — PROGRESS NOTES
Lab staff called at 2:44 with critical ANC for Clarisse of 0.3 and WBC 2.3. Information was relayed to Dr. Webber. NORMA Fountain RN, OCN, CBCN

## 2024-07-09 NOTE — PROGRESS NOTES
[facility] Hematology and Oncology Consult Note    Patient: Clarisse Dubon  MRN: [id number]  Date of Service: Jul 9, 2024       Reason for Visit    Chief Complaint   Patient presents with    Hematology     New consult Neutropenia and Thrombocytopenia             ECOG Performance 0 - Independent        _____________________________________________________________________________    History Of Present Illness    Ms. Clarisse Dubon is a very pleasant 68 year old female who has been referred to me for evaluation of leukopenia.  She was in her doctor's office recently and was noted to have a low white count.  Her total white count was 1.7 but her neutrophil count was only 200 as far back as January 2023 her total white count was low at 2.7 and her ANC was 800.  It is interesting to note that she gives us a history of rheumatoid arthritis for which she was treated with methotrexate for 4 years.  She finished methotrexate in 2019 and there is a CBC from 2020 that showed that her counts were normal at that time.  She also reports that for the last 6 months or so her symptoms of rheumatoid arthritis are starting to come back.  She wakes up much more stiffer than usual she has to take ibuprofen once or twice a day on a regular basis to help with symptoms.  She has been trying to get in to see her rheumatologist however she has an appointment in the first week of January.  She otherwise reports that she continues to be active she denies any nausea vomiting denies any recent infections or febrile illnesses.  She also denies any recent platelet from infections        Review of systems.  Apart from describing in history, the remainder of comprehensive ROS was negative.    Past History    Past Medical History:   Diagnosis Date    Arthritis     Atrial fibrillation (H)     Diagnosed 1/2/14.  CHADS2 - VASc score = 1 (gender) ASA Rx Sotalol (bradycardia) Flecainide pre-ablation PVI Dec 2014       Cellulitis of ankle     Right     Cervical dysplasia     Contact dermatitis and eczema due to plant 06/03/2018    Contact dermatitis and other eczema due to plants (except food)     Poison Ivy    Cyclic citrullinated peptide (CCP) antibody positive 03/09/2017    Edema     due to arthritis    Essential Hypercholesterolemia     Dx July 1999       Facial cellulitis 06/03/2018    Foot pain, left 09/10/2015    Ganglion cyst of left foot 03/08/2023    Added automatically from request for surgery [id number]    Herpes simplex     type 1    History of transfusion     Hyperlipidemia     Insufficiency fracture of tibia 11/20/2015    Right ankle insufficiency fracture    Motion sickness     PONV (postoperative nausea and vomiting)     Rheumatoid arthritis (H)     Rheumatoid arthritis (H)     Sinus bradycardia     Ureteral stone     Left    Vitamin D deficiency      Past Surgical History:   Procedure Laterality Date    BIOPSY BREAST Right 09/26/2007    Biopsy Breast Percutaneous Needle Core 7;  Comments: Benign results    CARDIAC ELECTROPHYSIOLOGY MAPPING AND ABLATION  12/17/2014    Pulmonary vein isolation for AF    CARDIAC SURGERY  2012    cardiac ablation    CERVIX LESION DESTRUCTION  06/01/1990    for ARAM I    DILATION AND CURETTAGE      for menorrhagia, Hgb 5.7    EXCISE GANGLION FOOT Left 03/20/2023    Procedure: EXCISION, GANGLION CYST, left foot;  Surgeon: Trenton Acevedo DPM;  Location: [facility]    HYSTERECTOMY VAGINAL  07/25/2007    With A/P repair and enterocele repair for benign reasons    LAPAROSCOPIC HYSTERECTOMY TOTAL       Family History   Problem Relation Age of Onset    Heart Disease Mother     Diabetes Type 2  Mother     Hypertension Mother     Coronary Artery Disease Father     Heart Disease Father     Diabetes Type 2  Father     Hypertension Father     Atrial fibrillation Sister     Hypertension Brother     Coronary Artery Disease Brother     Leukemia Brother     Heart Disease Brother     Cerebrovascular Disease Sister     Lupus  Sister     Colon Cancer Sister      Social History     Socioeconomic History    Marital status:    Tobacco Use    Smoking status: Never     Passive exposure: Never    Smokeless tobacco: Never   Vaping Use    Vaping status: Never Used   Substance and Sexual Activity    Alcohol use: Not Currently    Drug use: No    Sexual activity: Yes     Partners: Male     Birth control/protection: None   Other Topics Concern    Parent/sibling w/ CABG, MI or angioplasty before 65F 55M? Yes     Social Determinants of Health     Financial Resource Strain: Low Risk  (5/13/2024)    Financial Resource Strain     Within the past 12 months, have you or your family members you live with been unable to get utilities (heat, electricity) when it was really needed?: No   Food Insecurity: Low Risk  (5/13/2024)    Food Insecurity     Within the past 12 months, did you worry that your food would run out before you got money to buy more?: No     Within the past 12 months, did the food you bought just not last and you didn t have money to get more?: No   Transportation Needs: Low Risk  (5/13/2024)    Transportation Needs     Within the past 12 months, has lack of transportation kept you from medical appointments, getting your medicines, non-medical meetings or appointments, work, or from getting things that you need?: No   Physical Activity: Insufficiently Active (5/13/2024)    Exercise Vital Sign     Days of Exercise per Week: 3 days     Minutes of Exercise per Session: 40 min   Stress: No Stress Concern Present (5/13/2024)    [employer] [place] [employer] - Occupational Stress Questionnaire     Feeling of Stress : Only a little   Social Connections: Unknown (5/13/2024)    Social Connection and Isolation Panel [NHANES]     Frequency of Social Gatherings with Friends and Family: Once a week   Interpersonal Safety: Low Risk  (5/13/2024)    Interpersonal Safety     Do you feel physically and emotionally safe where you currently  "live?: Yes     Within the past 12 months, have you been hit, slapped, kicked or otherwise physically hurt by someone?: No     Within the past 12 months, have you been humiliated or emotionally abused in other ways by your partner or ex-partner?: No   Housing Stability: Low Risk  (5/13/2024)    Housing Stability     Do you have housing? : Yes     Are you worried about losing your housing?: No       Allergies    No Known Allergies    Physical Exam    /87   Pulse 64   Resp 16   Ht 1.676 m (5' 6\")   Wt 54.9 kg (121 lb)   SpO2 98%   BMI 19.53 kg/m      General: alert, awake, not in acute distress  HEENT: Head: Normal, normocephalic, atraumatic.  Eye: Normal external eye, conjunctiva, lids cornea, MIKALA.  Nose: Normal external nose, mucus membranes and septum.  Pharynx: Normal buccal mucosa. Normal pharynx.  Neck / Thyroid: Supple, no masses, nodes, nodules or enlargement.  Lymphatics: No abnormally enlarged lymph nodes.  Chest: Normal chest wall and respirations. Clear to auscultation.  Heart: S1 S2 RRR, no murmur.   Abdomen: abdomen is soft without significant tenderness, masses, organomegaly or guarding  Extremities: normal strength, tone, and muscle mass  Skin: normal. no rash or abnormalities  CNS: non focal.    Lab Results    No results found for this or any previous visit (from the past 168 hour(s)).    Imaging Results    No results found.        Assessment/Plan    Neutropenia and mild thrombocytopenia  Patient has been sent for evaluation for leukopenia and her most recent CBC showed a white count of 1700 however she only had 10% neutrophils and her ANC was calculated at 200 it is also important to note that as far back as January 2023 she had an ANC of 800.  My suspicion is that her decreased count is related to reactivation of her rheumatoid arthritis for which she has not been treated for many years.  I think that the likelihood of having a hematologic malignancy is low.  I would repeat some labs " today including a CBC ESR 6, C-reactive protein, rheumatoid factor today and make decisions on further intervention based on the results.  I also encouraged her that she should try to get into see her rheumatologist as soon as possible.  If her neutrophil count continues to be daily no we will consider doing a bone marrow aspirate and biopsy          Signed by: Casi Webber MD

## 2024-07-09 NOTE — PROGRESS NOTES
"Oncology Rooming Note    July 9, 2024 1:47 PM   Clarisse Dubon is a 68 year old female who presents for:    Chief Complaint   Patient presents with    Hematology     New consult Neutropenia and Thrombocytopenia     Initial Vitals: /87   Pulse 64   Resp 16   Ht 1.676 m (5' 6\")   Wt 54.9 kg (121 lb)   SpO2 98%   BMI 19.53 kg/m   Estimated body mass index is 19.53 kg/m  as calculated from the following:    Height as of this encounter: 1.676 m (5' 6\").    Weight as of this encounter: 54.9 kg (121 lb). Body surface area is 1.6 meters squared.  Moderate Pain (4) Comment: Data Unavailable   No LMP recorded. Patient is postmenopausal.  Allergies reviewed: Yes  Medications reviewed: Yes    Medications: Medication refills not needed today.  Pharmacy name entered into [employer]: [id number] [address] AT [facility] [address]    Frailty Screening:   Is the patient here for a new oncology consult visit in cancer care? 1. Yes. Over the past month, have you experienced difficulty or required a caregiver to assist with:   1. Balance, walking or general mobility (including any falls)? NO  2. Completion of self-care tasks such as bathing, dressing, toileting, grooming/hygiene?  NO  3. Concentration or memory that affects your daily life?  NO       Clinical concerns:  new consult Neutropenia and Thrombocytopenia       Laine Rubio"

## 2024-07-10 LAB — RHEUMATOID FACT SERPL-ACNC: 127 IU/ML

## 2024-07-11 ENCOUNTER — TELEPHONE (OUTPATIENT)
Dept: RHEUMATOLOGY | Facility: CLINIC | Age: 69
End: 2024-07-11
Payer: COMMERCIAL

## 2024-07-11 DIAGNOSIS — D72.819 LEUKOPENIA, UNSPECIFIED TYPE: Primary | ICD-10-CM

## 2024-07-11 RX ORDER — LIDOCAINE HYDROCHLORIDE 10 MG/ML
10 INJECTION, SOLUTION EPIDURAL; INFILTRATION; INTRACAUDAL; PERINEURAL ONCE
OUTPATIENT
Start: 2024-07-11 | End: 2024-07-11

## 2024-07-11 RX ORDER — HYDROCODONE BITARTRATE AND ACETAMINOPHEN 5; 325 MG/1; MG/1
1 TABLET ORAL ONCE
Status: CANCELLED | OUTPATIENT
Start: 2024-07-11 | End: 2024-07-11

## 2024-07-11 RX ORDER — LORAZEPAM 1 MG/1
1 TABLET ORAL ONCE
Status: CANCELLED | OUTPATIENT
Start: 2024-07-11 | End: 2024-07-11

## 2024-07-11 NOTE — TELEPHONE ENCOUNTER
[facility] Call Center    Phone Message    May a detailed message be left on voicemail: yes     Reason for Call: Other: Provider to provider request   Vanessa is requesting Audrey Reed MBBS call Dr. Webber at [phone] or [phone] when next available. Thank you.     Action Taken: Other: Rheum    Travel Screening: Not Applicable

## 2024-07-11 NOTE — TELEPHONE ENCOUNTER
Last OV 2019  Dx: Osteoarthritis Of The Knee     Cyclic citrullinated peptide (CCP) antibody positive

## 2024-07-11 NOTE — PATIENT INSTRUCTIONS
Place patient instructions, either created by your organization or obtained from a 3rd party, here.

## 2024-07-12 ENCOUNTER — PATIENT OUTREACH (OUTPATIENT)
Dept: ONCOLOGY | Facility: HOSPITAL | Age: 69
End: 2024-07-12
Payer: COMMERCIAL

## 2024-07-12 NOTE — PROGRESS NOTES
[facility]: Cancer Care                                                                                         Called Clarisse to follow up as Dr. Webber phoned her yesterday and discussed ordering bone marrow biopsy. Schedulers have facilitated scheduling on 7/15/24, follow up to review the results is scheduled on 7/30/24 with Dr. Webber. Education on bone marrow biopsy reviewed in detail. Educated about the biopsy procedure and symptoms post biopsy to monitor. Relayed that premedication of Norco and ativan was ordered and she can elect if she would like premeds. Lidocaine will be injected locally in bone marrow site to help minimize discomfort during the procedure. Advised light breakfast morning of procedure, comfortable loose pants, needs , post-op care, pressure dressing intact for 24 hours,may shower 24 hours post procedure, etc. Educational Handout on bone marrow biopsy will be provided to her on the day of her procedure.   She also had questions about Rheumatology. Shared that her Rheum provider had been paged but had not yet called back, will page again on Monday when Dr. Webber is back in the office, unless they have already spoken.  She would like assistance with advancing Contact information was provided for any questions or concerns that arise.      Signature:  Vanessa Mckay RN

## 2024-07-15 ENCOUNTER — PROCEDURE ONLY VISIT (OUTPATIENT)
Dept: ONCOLOGY | Facility: HOSPITAL | Age: 69
End: 2024-07-15
Attending: INTERNAL MEDICINE
Payer: COMMERCIAL

## 2024-07-15 ENCOUNTER — LAB (OUTPATIENT)
Dept: INFUSION THERAPY | Facility: HOSPITAL | Age: 69
End: 2024-07-15
Attending: INTERNAL MEDICINE
Payer: COMMERCIAL

## 2024-07-15 VITALS
SYSTOLIC BLOOD PRESSURE: 110 MMHG | BODY MASS INDEX: 19.37 KG/M2 | RESPIRATION RATE: 16 BRPM | HEIGHT: 66 IN | TEMPERATURE: 98.2 F | WEIGHT: 120.5 LBS | HEART RATE: 59 BPM | OXYGEN SATURATION: 98 % | DIASTOLIC BLOOD PRESSURE: 70 MMHG

## 2024-07-15 DIAGNOSIS — D72.819 LEUKOPENIA, UNSPECIFIED TYPE: Primary | ICD-10-CM

## 2024-07-15 DIAGNOSIS — D72.819 LEUKOPENIA, UNSPECIFIED TYPE: ICD-10-CM

## 2024-07-15 LAB
BASOPHILS # BLD AUTO: 0 10E3/UL (ref 0–0.2)
BASOPHILS NFR BLD AUTO: 1 %
EOSINOPHIL # BLD AUTO: 0 10E3/UL (ref 0–0.7)
EOSINOPHIL NFR BLD AUTO: 1 %
ERYTHROCYTE [DISTWIDTH] IN BLOOD BY AUTOMATED COUNT: 12.8 % (ref 10–15)
HCT VFR BLD AUTO: 35.2 % (ref 35–47)
HGB BLD-MCNC: 11.7 G/DL (ref 11.7–15.7)
HOLD SPECIMEN: NORMAL
IMM GRANULOCYTES # BLD: 0 10E3/UL
IMM GRANULOCYTES NFR BLD: 0 %
INTERPRETATION: NORMAL
LYMPHOCYTES # BLD AUTO: 1.1 10E3/UL (ref 0.8–5.3)
LYMPHOCYTES NFR BLD AUTO: 70 %
MCH RBC QN AUTO: 27.9 PG (ref 26.5–33)
MCHC RBC AUTO-ENTMCNC: 33.2 G/DL (ref 31.5–36.5)
MCV RBC AUTO: 84 FL (ref 78–100)
MONOCYTES # BLD AUTO: 0.2 10E3/UL (ref 0–1.3)
MONOCYTES NFR BLD AUTO: 15 %
NEUTROPHILS # BLD AUTO: 0.2 10E3/UL (ref 1.6–8.3)
NEUTROPHILS NFR BLD AUTO: 14 %
NRBC # BLD AUTO: 0 10E3/UL
NRBC BLD AUTO-RTO: 0 /100
PLAT MORPH BLD: NORMAL
PLATELET # BLD AUTO: 113 10E3/UL (ref 150–450)
RBC # BLD AUTO: 4.2 10E6/UL (ref 3.8–5.2)
RBC MORPH BLD: NORMAL
SIGNIFICANT RESULTS: NORMAL
SPECIMEN DESCRIPTION: NORMAL
TEST DETAILS, MDL: NORMAL
WBC # BLD AUTO: 1.6 10E3/UL (ref 4–11)

## 2024-07-15 PROCEDURE — 88369 M/PHMTRC ALYSISHQUANT/SEMIQ: CPT | Mod: 26 | Performed by: MEDICAL GENETICS

## 2024-07-15 PROCEDURE — 38222 DX BONE MARROW BX & ASPIR: CPT | Performed by: NURSE PRACTITIONER

## 2024-07-15 PROCEDURE — 36415 COLL VENOUS BLD VENIPUNCTURE: CPT | Performed by: NURSE PRACTITIONER

## 2024-07-15 PROCEDURE — 88189 FLOWCYTOMETRY/READ 16 & >: CPT | Mod: GC | Performed by: STUDENT IN AN ORGANIZED HEALTH CARE EDUCATION/TRAINING PROGRAM

## 2024-07-15 PROCEDURE — 88185 FLOWCYTOMETRY/TC ADD-ON: CPT | Performed by: INTERNAL MEDICINE

## 2024-07-15 PROCEDURE — 88291 CYTO/MOLECULAR REPORT: CPT | Performed by: MEDICAL GENETICS

## 2024-07-15 PROCEDURE — 88271 CYTOGENETICS DNA PROBE: CPT | Performed by: NURSE PRACTITIONER

## 2024-07-15 PROCEDURE — 85025 COMPLETE CBC W/AUTO DIFF WBC: CPT | Performed by: NURSE PRACTITIONER

## 2024-07-15 PROCEDURE — 88368 INSITU HYBRIDIZATION MANUAL: CPT | Mod: 26 | Performed by: MEDICAL GENETICS

## 2024-07-15 PROCEDURE — 250N000013 HC RX MED GY IP 250 OP 250 PS 637: Performed by: INTERNAL MEDICINE

## 2024-07-15 PROCEDURE — 88342 IMHCHEM/IMCYTCHM 1ST ANTB: CPT | Mod: TC,XU | Performed by: NURSE PRACTITIONER

## 2024-07-15 PROCEDURE — 88237 TISSUE CULTURE BONE MARROW: CPT | Performed by: NURSE PRACTITIONER

## 2024-07-15 RX ORDER — HYDROCODONE BITARTRATE AND ACETAMINOPHEN 5; 325 MG/1; MG/1
1 TABLET ORAL ONCE
Status: COMPLETED | OUTPATIENT
Start: 2024-07-15 | End: 2024-07-15

## 2024-07-15 RX ORDER — LORAZEPAM 1 MG/1
1 TABLET ORAL ONCE
Status: COMPLETED | OUTPATIENT
Start: 2024-07-15 | End: 2024-07-15

## 2024-07-15 RX ADMIN — LORAZEPAM 1 MG: 1 TABLET ORAL at 11:08

## 2024-07-15 RX ADMIN — HYDROCODONE BITARTRATE AND ACETAMINOPHEN 1 TABLET: 5; 325 TABLET ORAL at 11:08

## 2024-07-15 ASSESSMENT — PAIN SCALES - GENERAL: PAINLEVEL: NO PAIN (0)

## 2024-07-15 NOTE — PROGRESS NOTES
PROCEDURE: Bone marrow Biopsy and Aspiration    INDICATIONS FOR PROCEDURE: neutropenia, thrombocytopenia    DESCRIPTION OF PROCEDURE: Pt was given written information about the procedure.  They provided their written informed consent.  I then went on to premedicate the patient with lorazepam and hydrocodone.  The patient got into the prone position and I sterilely prepped and draped their right posterior iliac crest.  I used 1% local lidocaine for anesthesia.  I then used an 8-gauge [employer] needle.  I was able to get about 10 mL of particulate aspirate.  I then went on to get a core of trabecular bone that was about 25 mm in size.  Patient tolerated the procedure with minimal pain and bleeding.  The specimens were sent for routine histology, flow cytometry, and cytogenetics.  I placed a sterile pressure dressing with instructions for it to remain clean dry and intact for 24 hours.  Patient will return to the clinic for follow-up of these results.

## 2024-07-15 NOTE — PROGRESS NOTES
BMBX Teaching and Assessment        Teaching concerns addressed:  Bone Marrow Biopsy and infection prevention      Patient demonstrates understanding of the following:   Reason for the appointment, diagnosis and treatment plan.  Knowledge of proper use of medications and conditions for which they are ordered (with special attention to potential side effects or drug interactions).  Which situations necessitate calling provider and whom to contact     Teaching concerns addressed:   Reviewed activity restrictions if received premeds, potential for bleeding and actions to take if develops any issues  Pain management techniques  Patient instructed on hand hygiene  How and/when to access community resources     Infection Control  Patient demonstrates understanding of the following:   Bone marrow procedure site care taught  Signs and symptoms of infection taught        Instructional Materials Used/Given: Pt instructed to keep BMBX site clean and dry for 24hrs. Pt educated to monitor site for signs of infection such as redness, rash, oozing, puss, bleeding, pain, and elevated temp. Pt instructed to go to call the triage line or go to the ER if any signs of infection should occur. Pt verbalize understanding.     Pre-procedure: labs drawn via venipuncture. Patient given ativan 1mg along with 1 tab norco 30 minutes prior to procedure as ordered by Dr Webber.    Post procedure: Patient left clinic ambulatory prior to someone being able to check site to see if clean, dry and intact prior to discharge. Pt discharged self prior to vitals being checked, with  as .     Dr Webber follow-up appointment scheduled on 7/30/24 to get lab check(CBC) and bone marrow biopsy results.  Patient was made aware of this and she does have MyChart as well.    Darcie Sanchez RN, OCN

## 2024-07-15 NOTE — TELEPHONE ENCOUNTER
Patient scheduled for a new appointment  With Dr. Reed on Aug. 6th 2024 @ 12noon. Left voice mail message and sent my chart message for patient.    Jailyn RIVAS LPN

## 2024-07-15 NOTE — PROGRESS NOTES
DATE/TIME OF CALL RECEIVED FROM LAB:  07/15/24 at 11:16 AM   LAB TEST:  CBC/diff  LAB VALUE:  ANC 0.2  PROVIDER NOTIFIED?: Yes  PROVIDER NAME: Tereza Solis CNP and clinic RNCCs  DATE/TIME LAB VALUE REPORTED TO PROVIDER: 7/15/24 at 11:16 AM  MECHANISM OF PROVIDER NOTIFICATION: Face-To-Face and Teams  PROVIDER RESPONSE: Tereza Solis CNP will do bone marrow biopsy as scheduled today.  Zara Leyva RN on 7/15/2024 at 11:22 AM

## 2024-07-15 NOTE — PROGRESS NOTES
"Oncology Rooming Note    July 15, 2024 10:45 AM   Clarisse Dubon is a 68 year old female who presents for:    Chief Complaint   Patient presents with    Oncology Clinic Visit     Bone Marrow Biopsy related to      Initial Vitals: /70 (BP Location: Left arm, Patient Position: Sitting, Cuff Size: Adult Regular)   Pulse 59   Temp 98.2  F (36.8  C) (Oral)   Resp 16   Ht 1.676 m (5' 6\")   Wt 54.7 kg (120 lb 8 oz)   SpO2 98%   BMI 19.45 kg/m   Estimated body mass index is 19.45 kg/m  as calculated from the following:    Height as of this encounter: 1.676 m (5' 6\").    Weight as of this encounter: 54.7 kg (120 lb 8 oz). Body surface area is 1.6 meters squared.  No Pain (0) Comment: Data Unavailable   No LMP recorded. Patient is postmenopausal.  Allergies reviewed: Yes  Medications reviewed: Yes    Medications: Medication refills not needed today.  Pharmacy [facility] entered into [employer]: [id number] [address] AT [facility] [place] [address]    Frailty Screening:   Is the patient here for a new oncology consult visit in cancer care? 2. No      Clinical concerns: Bone marrow biopsy.       Yadira Greer CMA            "

## 2024-07-16 LAB — INTERPRETATION: NORMAL

## 2024-07-18 LAB
PATH REPORT.COMMENTS IMP SPEC: ABNORMAL
PATH REPORT.COMMENTS IMP SPEC: YES
PATH REPORT.FINAL DX SPEC: ABNORMAL
PATH REPORT.MICROSCOPIC SPEC OTHER STN: ABNORMAL
PATH REPORT.RELEVANT HX SPEC: ABNORMAL

## 2024-07-20 PROCEDURE — 85097 BONE MARROW INTERPRETATION: CPT | Performed by: PATHOLOGY

## 2024-07-20 PROCEDURE — 88342 IMHCHEM/IMCYTCHM 1ST ANTB: CPT | Mod: 26 | Performed by: PATHOLOGY

## 2024-07-20 PROCEDURE — 88311 DECALCIFY TISSUE: CPT | Mod: 26 | Performed by: PATHOLOGY

## 2024-07-20 PROCEDURE — 88305 TISSUE EXAM BY PATHOLOGIST: CPT | Mod: 26 | Performed by: PATHOLOGY

## 2024-07-20 PROCEDURE — 88313 SPECIAL STAINS GROUP 2: CPT | Mod: 26 | Performed by: PATHOLOGY

## 2024-07-20 PROCEDURE — 88341 IMHCHEM/IMCYTCHM EA ADD ANTB: CPT | Mod: 26 | Performed by: PATHOLOGY

## 2024-07-25 ENCOUNTER — PATIENT OUTREACH (OUTPATIENT)
Dept: ONCOLOGY | Facility: HOSPITAL | Age: 69
End: 2024-07-25
Payer: COMMERCIAL

## 2024-07-25 NOTE — PROGRESS NOTES
Situation: Patient chart reviewed by care coordinator.    Background: patient had bone marrow biopsy on 7/15/24, follow up is scheduled on 7/30/24    Assessment: Chromosome analysis and FISH are still in process. Called the [facility] lab and informed them of the upcoming  follow up and need for results. They made of this and will work to complete for her apt,    Plan/Recommendations:  Will monitor for the results.    Vanessa Mckay RN   Tumor Debulked?: curette

## 2024-07-26 LAB
CULTURE HARVEST COMPLETE DATE: NORMAL
CULTURE HARVEST COMPLETE DATE: NORMAL

## 2024-07-28 LAB — INTERPRETATION: NORMAL

## 2024-07-29 LAB
ADDITIONAL COMMENTS: NORMAL
INTERPRETATION: NORMAL
ISCN: NORMAL
METHODS: NORMAL

## 2024-07-30 ENCOUNTER — LAB (OUTPATIENT)
Dept: INFUSION THERAPY | Facility: HOSPITAL | Age: 69
End: 2024-07-30
Attending: INTERNAL MEDICINE
Payer: COMMERCIAL

## 2024-07-30 ENCOUNTER — PATIENT OUTREACH (OUTPATIENT)
Dept: ONCOLOGY | Facility: HOSPITAL | Age: 69
End: 2024-07-30

## 2024-07-30 ENCOUNTER — ONCOLOGY VISIT (OUTPATIENT)
Dept: ONCOLOGY | Facility: HOSPITAL | Age: 69
End: 2024-07-30
Attending: INTERNAL MEDICINE
Payer: COMMERCIAL

## 2024-07-30 VITALS
HEIGHT: 66 IN | HEART RATE: 60 BPM | WEIGHT: 119.3 LBS | DIASTOLIC BLOOD PRESSURE: 81 MMHG | SYSTOLIC BLOOD PRESSURE: 113 MMHG | TEMPERATURE: 98.1 F | OXYGEN SATURATION: 98 % | BODY MASS INDEX: 19.17 KG/M2 | RESPIRATION RATE: 18 BRPM

## 2024-07-30 DIAGNOSIS — D72.819 LEUKOPENIA, UNSPECIFIED TYPE: Primary | ICD-10-CM

## 2024-07-30 DIAGNOSIS — D72.819 LEUKOPENIA, UNSPECIFIED TYPE: ICD-10-CM

## 2024-07-30 DIAGNOSIS — C95.90 LEUKEMIA, UNSPECIFIED NOT HAVING ACHIEVED REMISSION (H): Primary | ICD-10-CM

## 2024-07-30 LAB
BASOPHILS # BLD AUTO: 0 10E3/UL (ref 0–0.2)
BASOPHILS NFR BLD AUTO: 1 %
BKR LAB AP ADD'L TEST STATUS: NORMAL
BKR LAB LINKED CASE OR SPECIMEN ID: NORMAL
BKR PATH ADDL TEST FINAL COMMENTS: NORMAL
EOSINOPHIL # BLD AUTO: 0 10E3/UL (ref 0–0.7)
EOSINOPHIL NFR BLD AUTO: 1 %
ERYTHROCYTE [DISTWIDTH] IN BLOOD BY AUTOMATED COUNT: 13.1 % (ref 10–15)
HCT VFR BLD AUTO: 35.6 % (ref 35–47)
HGB BLD-MCNC: 11.8 G/DL (ref 11.7–15.7)
IMM GRANULOCYTES # BLD: 0 10E3/UL
IMM GRANULOCYTES NFR BLD: 0 %
LAB DIRECTOR COMMENTS: ABNORMAL
LAB DIRECTOR DISCLAIMER: ABNORMAL
LAB DIRECTOR INTERPRETATION: ABNORMAL
LAB DIRECTOR METHODOLOGY: ABNORMAL
LAB DIRECTOR RESULTS: ABNORMAL
LYMPHOCYTES # BLD AUTO: 1.2 10E3/UL (ref 0.8–5.3)
LYMPHOCYTES NFR BLD AUTO: 66 %
MCH RBC QN AUTO: 27.8 PG (ref 26.5–33)
MCHC RBC AUTO-ENTMCNC: 33.1 G/DL (ref 31.5–36.5)
MCV RBC AUTO: 84 FL (ref 78–100)
MONOCYTES # BLD AUTO: 0.3 10E3/UL (ref 0–1.3)
MONOCYTES NFR BLD AUTO: 16 %
NEUTROPHILS # BLD AUTO: 0.3 10E3/UL (ref 1.6–8.3)
NEUTROPHILS NFR BLD AUTO: 16 %
NRBC # BLD AUTO: 0 10E3/UL
NRBC BLD AUTO-RTO: 0 /100
PATH REPORT.ADDENDUM SPEC: NORMAL
PATH REPORT.COMMENTS IMP SPEC: NORMAL
PATH REPORT.COMMENTS IMP SPEC: NORMAL
PATH REPORT.FINAL DX SPEC: NORMAL
PATH REPORT.GROSS SPEC: NORMAL
PATH REPORT.MICROSCOPIC SPEC OTHER STN: NORMAL
PATH REPORT.MICROSCOPIC SPEC OTHER STN: NORMAL
PATH REPORT.RELEVANT HX SPEC: NORMAL
PLAT MORPH BLD: NORMAL
PLATELET # BLD AUTO: 138 10E3/UL (ref 150–450)
RBC # BLD AUTO: 4.24 10E6/UL (ref 3.8–5.2)
RBC MORPH BLD: NORMAL
SPECIMEN DESCRIPTION: ABNORMAL
WBC # BLD AUTO: 1.8 10E3/UL (ref 4–11)

## 2024-07-30 PROCEDURE — 81450 HL NEO GSAP 5-50DNA/DNA&RNA: CPT | Performed by: INTERNAL MEDICINE

## 2024-07-30 PROCEDURE — 99214 OFFICE O/P EST MOD 30 MIN: CPT | Performed by: INTERNAL MEDICINE

## 2024-07-30 PROCEDURE — G0463 HOSPITAL OUTPT CLINIC VISIT: HCPCS | Performed by: INTERNAL MEDICINE

## 2024-07-30 PROCEDURE — G2211 COMPLEX E/M VISIT ADD ON: HCPCS | Performed by: INTERNAL MEDICINE

## 2024-07-30 PROCEDURE — 85025 COMPLETE CBC W/AUTO DIFF WBC: CPT

## 2024-07-30 PROCEDURE — G0452 MOLECULAR PATHOLOGY INTERPR: HCPCS | Mod: 26 | Performed by: STUDENT IN AN ORGANIZED HEALTH CARE EDUCATION/TRAINING PROGRAM

## 2024-07-30 PROCEDURE — 36415 COLL VENOUS BLD VENIPUNCTURE: CPT

## 2024-07-30 ASSESSMENT — PAIN SCALES - GENERAL: PAINLEVEL: MILD PAIN (3)

## 2024-07-30 NOTE — PROGRESS NOTES
"Oncology Rooming Note    July 30, 2024 8:58 AM   Clarisse Dubon is a 68 year old female who presents for:    Chief Complaint   Patient presents with    Hematology     Returning patient consult: Leukopenia, unspecified type, labs, BMBX follow up.     Initial Vitals: /81   Pulse 60   Temp 98.1  F (36.7  C)   Resp 18   Ht 1.676 m (5' 6\")   Wt 54.1 kg (119 lb 4.8 oz)   SpO2 98%   BMI 19.26 kg/m   Estimated body mass index is 19.26 kg/m  as calculated from the following:    Height as of this encounter: 1.676 m (5' 6\").    Weight as of this encounter: 54.1 kg (119 lb 4.8 oz). Body surface area is 1.59 meters squared.  Mild Pain (3) Comment: Data Unavailable   No LMP recorded. Patient is postmenopausal.  Allergies reviewed: Yes  Medications reviewed: Yes    Medications: Medication refills not needed today.  Pharmacy name entered into [employer]: [id number] [address] AT [facility] [address]    Frailty Screening:   Is the patient here for a new oncology consult visit in cancer care? 2. No      Clinical concerns:  RETURN [facility] - labs, BMBX follow up.      Sarah Hackett MA            "

## 2024-07-30 NOTE — PROGRESS NOTES
[facility] Hematology and Oncology Progress Note    Patient: Clarisse Dubon  MRN: [id number]  Date of Service: Jul 30, 2024             ECOG Performance    0 - Independent          ______________________________________________________________________________  Oncologic history  Chronic neutropenia.  Bone marrow aspirate and biopsy negative for any obvious hematologic disorder.  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  Cytogenetics normal FISH negative for any abnormality July 2024        History of Present Illness  Ms. Clarisse Dubon is here in follow-up.  She had a bone marrow aspirate and biopsy done to workup her neutropenia.  She otherwise feels well she continues to be completely asymptomatic she has some pains and aches from her rheumatoid arthritis.  She denies having any infections and has not needed any antibiotics for a bacterial infection in the last many months.    Review of systems  A comprehensive 12 point review of system was done that was negative except what is mentioned in the history of present illness    Past History    Past Medical History:   Diagnosis Date    Arthritis     Atrial fibrillation (H)     Diagnosed 1/2/14.  CHADS2 - VASc score = 1 (gender) ASA Rx Sotalol (bradycardia) Flecainide pre-ablation PVI Dec 2014       Cellulitis of ankle     Right    Cervical dysplasia     Contact dermatitis and eczema due to plant 06/03/2018    Contact dermatitis and other eczema due to plants (except food)     Poison Ivy    Cyclic citrullinated peptide (CCP) antibody positive 03/09/2017    Edema     due to arthritis    Essential Hypercholesterolemia     Dx July 1999       Facial cellulitis 06/03/2018    Foot pain, left 09/10/2015    Ganglion cyst of left foot 03/08/2023    Added automatically from request for surgery [id number]    Herpes simplex     type 1    History of transfusion     Hyperlipidemia     Insufficiency fracture of tibia 11/20/2015    Right ankle insufficiency  "fracture    Motion sickness     PONV (postoperative nausea and vomiting)     Rheumatoid arthritis (H)     Rheumatoid arthritis (H)     Sinus bradycardia     Ureteral stone     Left    Vitamin D deficiency        Past Surgical History:   Procedure Laterality Date    BIOPSY BREAST Right 09/26/2007    Biopsy Breast Percutaneous Needle Core 7;  Comments: Benign results    CARDIAC ELECTROPHYSIOLOGY MAPPING AND ABLATION  12/17/2014    Pulmonary vein isolation for AF    CARDIAC SURGERY  2012    cardiac ablation    CERVIX LESION DESTRUCTION  06/01/1990    for ARAM I    DILATION AND CURETTAGE      for menorrhagia, Hgb 5.7    EXCISE GANGLION FOOT Left 03/20/2023    Procedure: EXCISION, GANGLION CYST, left foot;  Surgeon: Trenton Acevedo DPM;  Location: [facility]    HYSTERECTOMY VAGINAL  07/25/2007    With A/P repair and enterocele repair for benign reasons    LAPAROSCOPIC HYSTERECTOMY TOTAL         Physical Exam    /81   Pulse 60   Temp 98.1  F (36.7  C)   Resp 18   Ht 1.676 m (5' 6\")   Wt 54.1 kg (119 lb 4.8 oz)   SpO2 98%   BMI 19.26 kg/m      General: alert, awake, not in acute distress  HEENT: Head: Normal, normocephalic, atraumatic.  Eye: Normal external eye, conjunctiva, lids cornea, MIKALA.  Nose: Normal external nose, mucus membranes and septum.  Pharynx: Normal buccal mucosa. Normal pharynx.  Neck / Thyroid: Supple, no masses, nodes, nodules or enlargement.  Lymphatics: No abnormally enlarged lymph nodes.  Chest: Normal chest wall and respirations. Clear to auscultation.  No crackles or rhonchi's  Heart: S1 S2 RRR, no murmur.   Abdomen: abdomen is soft without significant tenderness, masses, organomegaly or guarding  Extremities: normal strength, tone, and muscle mass  Skin: normal. no rash or abnormalities  CNS: non focal.    Lab Results    No results found for this or any previous visit (from the past 240 hour(s)).      Imaging    No results found.        Assessment and Plan  Chronic " neutropenia in the setting of rheumatoid arthritis  Patient is here in follow-up she continues to be neutropenic her ANC is 300 today.  She did had a bone marrow aspirate and biopsy done which was reviewed by me there is no obvious abnormality.  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  She also had an atypical T-cell infiltrate.  There was a comment on flow cytometry that there was increase in the number of large granular lymphocytes.  I will reach out to pathology to rule out the chance of the patient having a large granular lymphocytic leukemia because if that is the case that would be treated with methotrexate.  However if the opinion of pathology is that it is a reactive clone that we would continue to follow the patient.  I will also plan to send her bone marrow sample for a myeloid NGS panel to see if there are any clonal mutations that can be identified.  In the interim we will continue to follow CBCs once a month and I will see the patient in about 4 months.  Neupogen will be used if patient has any bacterial.  All incisions were discussed with the patient and her questions were answered.      1)Work with pathology to rule out the chances of patient having LGL  2) sendbone marrow sample for a myeloid NGS panel  3) follow CBCs monthly phone follow-up in about 4 to 6 months.  Patient to call us if she has any fevers or evidence of bacterial infection    I was able to review patient's bone marrow with our hematopathologist.  He is going to order T-cell gene rearrangement on the bone marrow to look for clonality in the large granular lymphocytes that are being seen.  We would also do T-cell gene rearrangement studies on the blood.  If clonal large granular lymphocytes are found we will consider treatment with methotrexate.      Signed by: Casi Webber MD        CC: Scarlet Zuniga MD

## 2024-07-30 NOTE — PROGRESS NOTES
Patti from the lab called a critical ANC of 0.3 at 8:47, it was then passed on to Dr. Webber immed after. NORMA Fountain RN, OCN, CBCN

## 2024-07-30 NOTE — LETTER
"7/30/2024      Clarisse Dubon  [id number] [address] [address]      Dear Colleague,    Thank you for referring your patient, Clarisse Dubon, to the [facility]. Please see a copy of my visit note below.    Patti from the lab called a critical ANC of 0.3 at 8:47, it was then passed on to Dr. Webber immed after. NORMA Fountain RN, OCN, CBCN    Oncology Rooming Note    July 30, 2024 8:58 AM   Clarisse Dubon is a 68 year old female who presents for:    Chief Complaint   Patient presents with     Hematology     Returning patient consult: Leukopenia, unspecified type, labs, BMBX follow up.     Initial Vitals: /81   Pulse 60   Temp 98.1  F (36.7  C)   Resp 18   Ht 1.676 m (5' 6\")   Wt 54.1 kg (119 lb 4.8 oz)   SpO2 98%   BMI 19.26 kg/m   Estimated body mass index is 19.26 kg/m  as calculated from the following:    Height as of this encounter: 1.676 m (5' 6\").    Weight as of this encounter: 54.1 kg (119 lb 4.8 oz). Body surface area is 1.59 meters squared.  Mild Pain (3) Comment: Data Unavailable   No LMP recorded. Patient is postmenopausal.  Allergies reviewed: Yes  Medications reviewed: Yes    Medications: Medication refills not needed today.  Pharmacy name entered into [employer]: [id number] [address] AT [facility] OF [address]    Frailty Screening:   Is the patient here for a new oncology consult visit in cancer care? 2. No      Clinical concerns:  RETURN [facility] - labs, BMBX follow up.      Sarah Hackett MA              [facility] Hematology and Oncology Progress Note    Patient: Clarisse Dubon  MRN: [id number]  Date of Service: Jul 30, 2024             ECOG Performance    0 - Independent          ______________________________________________________________________________  Oncologic history  Chronic neutropenia.  Bone marrow aspirate and biopsy negative for any obvious hematologic disorder.  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  Cytogenetics normal FISH negative for any abnormality July 2024        History of Present Illness  Ms. Clarisse Dubon is here in follow-up.  She had a bone marrow aspirate and biopsy done to workup her neutropenia.  She otherwise feels well she continues to be completely asymptomatic she has some pains and aches from her rheumatoid arthritis.  She denies having any infections and has not needed any antibiotics for a bacterial infection in the last many months.    Review of systems  A comprehensive 12 point review of system was done that was negative except what is mentioned in the history of present illness    Past History    Past Medical History:   Diagnosis Date     Arthritis      Atrial fibrillation (H)     Diagnosed 1/2/14.  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     Edema     due to arthritis     Essential Hypercholesterolemia     Dx July 1999        Facial cellulitis 06/03/2018     Foot pain, left 09/10/2015     Ganglion cyst of left foot 03/08/2023    Added automatically from request for surgery [id number]     Herpes simplex     type 1     History of transfusion      Hyperlipidemia      Insufficiency fracture of tibia 11/20/2015    Right ankle insufficiency fracture     Motion sickness      PONV (postoperative nausea and vomiting)      Rheumatoid arthritis (H)      Rheumatoid arthritis (H)      Sinus bradycardia      Ureteral stone     Left     Vitamin D deficiency        Past Surgical History:   Procedure Laterality Date     BIOPSY BREAST Right 09/26/2007    Biopsy Breast Percutaneous Needle Core 7;  Comments: Benign results      "CARDIAC ELECTROPHYSIOLOGY MAPPING AND ABLATION  12/17/2014    Pulmonary vein isolation for AF     CARDIAC SURGERY  2012    cardiac ablation     CERVIX LESION DESTRUCTION  06/01/1990    for ARAM I     DILATION AND CURETTAGE      for menorrhagia, Hgb 5.7     EXCISE GANGLION FOOT Left 03/20/2023    Procedure: EXCISION, GANGLION CYST, left foot;  Surgeon: Trenton Acevedo DPM;  Location: [facility]     HYSTERECTOMY VAGINAL  07/25/2007    With A/P repair and enterocele repair for benign reasons     LAPAROSCOPIC HYSTERECTOMY TOTAL         Physical Exam    /81   Pulse 60   Temp 98.1  F (36.7  C)   Resp 18   Ht 1.676 m (5' 6\")   Wt 54.1 kg (119 lb 4.8 oz)   SpO2 98%   BMI 19.26 kg/m      General: alert, awake, not in acute distress  HEENT: Head: Normal, normocephalic, atraumatic.  Eye: Normal external eye, conjunctiva, lids cornea, MIKALA.  Nose: Normal external nose, mucus membranes and septum.  Pharynx: Normal buccal mucosa. Normal pharynx.  Neck / Thyroid: Supple, no masses, nodes, nodules or enlargement.  Lymphatics: No abnormally enlarged lymph nodes.  Chest: Normal chest wall and respirations. Clear to auscultation.  No crackles or rhonchi's  Heart: S1 S2 RRR, no murmur.   Abdomen: abdomen is soft without significant tenderness, masses, organomegaly or guarding  Extremities: normal strength, tone, and muscle mass  Skin: normal. no rash or abnormalities  CNS: non focal.    Lab Results    No results found for this or any previous visit (from the past 240 hour(s)).      Imaging    No results found.        Assessment and Plan  Chronic neutropenia in the setting of rheumatoid arthritis  Patient is here in follow-up she continues to be neutropenic her ANC is 300 today.  She did had a bone marrow aspirate and biopsy done which was reviewed by me there is no obvious abnormality.  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  She also had an atypical " T-cell infiltrate.  There was a comment on flow cytometry that there was increase in the number of large granular lymphocytes.  I will reach out to pathology to rule out the chance of the patient having a large granular lymphocytic leukemia because if that is the case that would be treated with methotrexate.  However if the opinion of pathology is that it is a reactive clone that we would continue to follow the patient.  I will also plan to send her bone marrow sample for a myeloid NGS panel to see if there are any clonal mutations that can be identified.  In the interim we will continue to follow CBCs once a month and I will see the patient in about 4 months.  Neupogen will be used if patient has any bacterial.  All incisions were discussed with the patient and her questions were answered.      1)Work with pathology to rule out the chances of patient having LGL  2) sendbone marrow sample for a myeloid NGS panel  3) follow CBCs monthly phone follow-up in about 4 to 6 months.  Patient to call us if she has any fevers or evidence of bacterial infection    I was able to review patient's bone marrow with our hematopathologist.  He is going to order T-cell gene rearrangement on the bone marrow to look for clonality in the large granular lymphocytes that are being seen.  We would also do T-cell gene rearrangement studies on the blood.  If clonal large granular lymphocytes are found we will consider treatment with methotrexate.      Signed by: Casi Webber MD        CC: Scarlet Zuniga MD       Again, thank you for allowing me to participate in the care of your patient.        Sincerely,        Casi Webber MD

## 2024-07-30 NOTE — PROGRESS NOTES
[facility]: Cancer Care                                                                                          0851 Per the request of Dr. Webber, path additional testing for myeloid Malignancy Comprehensive NGS panel was ordered for specimen [id number] from Bone Marrow Biopsy, completed on 7/15/2024. Per protocol, message sent to the  pathology  group at [facility]/ to alert them of the order.   -Noted that the specimen is in process and will monitor the results.    1045 per the request of Dr. Webber, order placed for T cell Receptor Gene Rearrangement for blood. Called the lab to see if the test could be added to the blood already collected earlier  today but there was not sufficient volume. Per Dr. Webber, lab can be collected next month when Clarisse comes to clinic, notes added to the clinic apt calendar.      Signature:  Vanessa Mckay RN

## 2024-07-31 PROCEDURE — 81342 TRG GENE REARRANGEMENT ANAL: CPT | Performed by: INTERNAL MEDICINE

## 2024-07-31 PROCEDURE — G0452 MOLECULAR PATHOLOGY INTERPR: HCPCS | Mod: 26 | Performed by: STUDENT IN AN ORGANIZED HEALTH CARE EDUCATION/TRAINING PROGRAM

## 2024-08-06 ENCOUNTER — OFFICE VISIT (OUTPATIENT)
Dept: RHEUMATOLOGY | Facility: CLINIC | Age: 69
End: 2024-08-06
Payer: COMMERCIAL

## 2024-08-06 VITALS
WEIGHT: 118.8 LBS | BODY MASS INDEX: 19.17 KG/M2 | OXYGEN SATURATION: 97 % | DIASTOLIC BLOOD PRESSURE: 62 MMHG | HEART RATE: 66 BPM | SYSTOLIC BLOOD PRESSURE: 110 MMHG

## 2024-08-06 DIAGNOSIS — M15.0 PRIMARY OSTEOARTHRITIS INVOLVING MULTIPLE JOINTS: ICD-10-CM

## 2024-08-06 DIAGNOSIS — D70.9 NEUTROPENIA, UNSPECIFIED TYPE (H): ICD-10-CM

## 2024-08-06 DIAGNOSIS — Z87.39 HISTORY OF RHEUMATOID ARTHRITIS: ICD-10-CM

## 2024-08-06 DIAGNOSIS — M25.50 POLYARTHRALGIA: ICD-10-CM

## 2024-08-06 DIAGNOSIS — M25.461 EFFUSION OF RIGHT KNEE: Primary | ICD-10-CM

## 2024-08-06 LAB
APPEARANCE FLD: CLEAR
CELL COUNT BODY FLUID SOURCE: NORMAL
COLOR FLD: COLORLESS
CRYSTALS SNV MICRO: NORMAL
LYMPHOCYTES NFR FLD MANUAL: 31 %
MONOS+MACROS NFR FLD MANUAL: 0 %
NEUTS BAND NFR FLD MANUAL: 1 %
OTHER CELLS FLD MANUAL: 69 %
WBC # FLD AUTO: 55 /UL

## 2024-08-06 PROCEDURE — 87205 SMEAR GRAM STAIN: CPT | Performed by: INTERNAL MEDICINE

## 2024-08-06 PROCEDURE — 89060 EXAM SYNOVIAL FLUID CRYSTALS: CPT | Performed by: INTERNAL MEDICINE

## 2024-08-06 PROCEDURE — 87070 CULTURE OTHR SPECIMN AEROBIC: CPT | Performed by: INTERNAL MEDICINE

## 2024-08-06 PROCEDURE — 20611 DRAIN/INJ JOINT/BURSA W/US: CPT | Mod: RT | Performed by: INTERNAL MEDICINE

## 2024-08-06 PROCEDURE — 99204 OFFICE O/P NEW MOD 45 MIN: CPT | Mod: 25 | Performed by: INTERNAL MEDICINE

## 2024-08-06 PROCEDURE — 89051 BODY FLUID CELL COUNT: CPT | Performed by: INTERNAL MEDICINE

## 2024-08-06 RX ORDER — TRIAMCINOLONE ACETONIDE 40 MG/ML
40 INJECTION, SUSPENSION INTRA-ARTICULAR; INTRAMUSCULAR ONCE
Status: COMPLETED | OUTPATIENT
Start: 2024-08-06 | End: 2024-08-06

## 2024-08-06 RX ORDER — DULOXETIN HYDROCHLORIDE 20 MG/1
20 CAPSULE, DELAYED RELEASE ORAL DAILY
Qty: 90 CAPSULE | Refills: 0 | Status: SHIPPED | OUTPATIENT
Start: 2024-08-06

## 2024-08-06 RX ADMIN — TRIAMCINOLONE ACETONIDE 40 MG: 40 INJECTION, SUSPENSION INTRA-ARTICULAR; INTRAMUSCULAR at 13:04

## 2024-08-06 NOTE — PROGRESS NOTES
This document was created using a software with less than 100% fidelity, at times resulting in unintended, even erroneous syntax and grammar.  The reader is advised to keep this under consideration while reviewing, interpreting this note.      Rheumatology Consult Note      Clarisse Dubon     YOB: 1955 Age: 68 year old    Date of visit: 8/06/24    PCP: Scarlet Zuniga    Chief Complaint   Patient presents with:  Consult      Assessment and Plan     Clarisse was seen today for consult.    Diagnoses and all orders for this visit:    Effusion of right knee  -     DE ARTHROCENTESIS ASPIR&/INJ MAJOR JT/BURSA W/US  -     triamcinolone (KENALOG-40) injection 40 mg  -     XR Hand Bilateral G/E 3 Views; Future  -     XR Knee AP/Lat Standing Bilateral; Future  -     DULoxetine (CYMBALTA) 20 MG capsule; Take 1 capsule (20 mg) by mouth daily  -     Cell count with differential fluid  -     Crystal ID Synovial Fluid  -     Synovial fluid Aerobic Bacterial Culture Routine With Gram Stain    Polyarthralgia    Primary osteoarthritis involving multiple joints  -     DULoxetine (CYMBALTA) 20 MG capsule; Take 1 capsule (20 mg) by mouth daily    History of rheumatoid arthritis    Neutropenia, unspecified type (H24)        This patient has polyarthralgias in association with osteoarthritis, prior history of rheumatoid arthritis managed with methotrexate had high titers of rheumatoid factor, anti-CCP antibody pending, has right knee effusion.  I have discussed this with her.  The likelihood that she has relapse of rheumatoid arthritis would be an obvious concern, however the preponderance of signals here are more suggestive of her arthralgias being secondary to osteoarthritis.  The knee aspiration performed today for aspiration of the effusion done under ultrasound guidance, yielded clear fluid consistent with OA.  This is discussed and showed to her.  40 mg of Kenalog was injected through the same portal.  Today's workup  as noted.  She wanted to consider other options besides acetaminophen, duloxetine as discussed prescription and major side effects outlined.  Will meet here in the next few weeks.  In the absence of evidence to suggest active rheumatoid arthritis, her cytopenias monitoring/management to continue at [facility]-on[facility].    Return to clinic: 6 weeks      HPI   Clarisse Dubon is a 68 year old female  is seen today for evaluation of painful joints.  She reports that these have started hurting her over the past 12 months or so.  The worst of the symptoms are in the knees more so on the right side.  She has difficult time walking, she has difficulty flexing the knee, she has pain in that knee more so during the night and with activity than at rest.  There is been no history of trauma.  She has noted some discomfort in other joints such as the hands pointing to the PIPs.  She has noted low back pain as well.  She has not observed sustained stiffness in the morning or swelling of any of her joints.  She has been taking Tylenol over-the-counter with some relief.  She was seen in hematology oncology for neutropenia, her BNP was 300.  She underwent bone marrow biopsy those data are as noted in Dr. Casi Webber's notes and reviewed..  Several years ago she had seropositive rheumatoid arthritis.  She was on CS DMARDs.  Over time her need for these had diminished, she credited that with change of diet elimination of sugar.She reports no history of psoriasis, ulcerative colitis Crohn's disease in the intervening.  Her family history significant for SLE in sister.       Active Problem List     Patient Active Problem List   Diagnosis    Osteoarthritis Of The Knee    Bradycardia by electrocardiogram    Status post ablation of atrial fibrillation    Frequent urination    Muscle strain    Microscopic hematuria     Past Medical History     Past Medical History:   Diagnosis Date    Arthritis     Atrial fibrillation (H)     Diagnosed 1/2/14.  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    Edema     due to arthritis    Essential Hypercholesterolemia     Dx July 1999       Facial cellulitis 06/03/2018    Foot pain, left 09/10/2015    Ganglion cyst of left foot 03/08/2023    Added automatically from request for surgery [id number]    Herpes simplex     type 1    History of transfusion     Hyperlipidemia     Insufficiency fracture of tibia 11/20/2015    Right ankle insufficiency fracture    Motion sickness     PONV (postoperative nausea and vomiting)     Rheumatoid arthritis (H)     Rheumatoid arthritis (H)     Sinus bradycardia     Ureteral stone     Left    Vitamin D deficiency      Past Surgical History     Past Surgical History:   Procedure Laterality Date    BIOPSY BREAST Right 09/26/2007    Biopsy Breast Percutaneous Needle Core 7;  Comments: Benign results    CARDIAC ELECTROPHYSIOLOGY MAPPING AND ABLATION  12/17/2014    Pulmonary vein isolation for AF    CARDIAC SURGERY  2012    cardiac ablation    CERVIX LESION DESTRUCTION  06/01/1990    for ARAM I    DILATION AND CURETTAGE      for menorrhagia, Hgb 5.7    EXCISE GANGLION FOOT Left 03/20/2023    Procedure: EXCISION, GANGLION CYST, left foot;  Surgeon: Trenton Acevedo DPM;  Location: [facility]    HYSTERECTOMY VAGINAL  07/25/2007    With A/P repair and enterocele repair for benign reasons    LAPAROSCOPIC HYSTERECTOMY TOTAL       Allergy   No Known Allergies  Current Medication List     Current Outpatient Medications   Medication Sig Dispense Refill    acetaminophen (TYLENOL) 325 MG tablet Take 325-650 mg by mouth every 6 hours as needed for mild pain      ibuprofen (ADVIL/MOTRIN) 200 MG  tablet Take 200 mg by mouth every 4 hours as needed for pain (Patient not taking: Reported on 7/30/2024)      triamcinolone (KENALOG) 0.1 % external cream Apply topically 3 times daily (Patient not taking: Reported on 7/9/2024) 30 g 1     No current facility-administered medications for this visit.       No current facility-administered medications for this visit.        Family History     Family History   Problem Relation Age of Onset    Heart Disease Mother     Diabetes Type 2  Mother     Hypertension Mother     Coronary Artery Disease Father     Heart Disease Father     Diabetes Type 2  Father     Hypertension Father     Atrial fibrillation Sister     Hypertension Brother     Coronary Artery Disease Brother     Leukemia Brother     Heart Disease Brother     Cerebrovascular Disease Sister     Lupus Sister     Colon Cancer Sister          Physical Exam     COMPREHENSIVE EXAMINATION:  Vitals:    08/06/24 1157   BP: 110/62   BP Location: Right arm   Patient Position: Sitting   Cuff Size: Adult Regular   Pulse: 66   SpO2: 97%   Weight: 53.9 kg (118 lb 12.8 oz)     A well appearing alert oriented female. Vital data as noted above. Her eyes examined for inflammation/scleromalacia. ENT examined for oral mucositis, moisture, thrush, nasal deformity, external ear redness, deformity. Her neck is examined for suppleness and lymphadenopathy.  Cardiopulmonary examination without dyspnea at rest, use of accessory muscles of breathing, wheezing, edema, peripheral or central cyanosis.  Abdomen examined for softness, tenderness, obvious organomegaly.  Skin examined for heliotrope, malar area eruption, lupus pernio, periungual erythema, sclerodactyly, papules, erythema nodosum, purpura, nail pitting, onycholysis, and obvious psoriasis lesion. Neurological examination done for alertness, speech, facial symmetry,  tone and power in upper and lower extremities, and gait. The joint examination is performed for swelling, tenderness,  warmth, erythema, and range of motion in the following joints: DIPs, PIPs, MCPs, wrists, first CMC's, elbows, shoulders, hips, knees, ankles, feet; spine for range of motion and paraspinal muscles for tenderness. The salient normal / abnormal findings are appended.  She has Heberden's, she does not have synovitis in any other palpable joints of upper and lower extremities.  She has warmth and tiny effusion of the right knee joint, confirmed on ultrasound guidance.  The left knee has even smaller effusion not suitable for aspiration.  She does not have dactylitis of digits or toes.    Labs / Imaging (select studies)     Rheumatoid Factor   Date Value Ref Range Status   07/09/2024 127 (H) <14 IU/mL Final      Hemoglobin   Date Value Ref Range Status   07/30/2024 11.8 11.7 - 15.7 g/dL Final   07/15/2024 11.7 11.7 - 15.7 g/dL Final   07/09/2024 12.7 11.7 - 15.7 g/dL Final     Urea Nitrogen   Date Value Ref Range Status   05/13/2024 12.5 8.0 - 23.0 mg/dL Final   11/07/2020 16 8 - 22 mg/dL Final   06/03/2018 18 8 - 22 mg/dL Final     Erythrocyte Sedimentation Rate   Date Value Ref Range Status   07/09/2024 10 0 - 30 mm/hr Final   01/24/2023 7 0 - 20 mm/hr Final   06/03/2018 8 0 - 20 mm/hr Final     CRP   Date Value Ref Range Status   11/07/2020 <0.1 0.0 - 0.8 mg/dL Final   06/03/2018 0.1 0.0 - 0.8 mg/dL Final     AST   Date Value Ref Range Status   05/13/2024 24 0 - 45 U/L Final     Comment:     Reference intervals for this test were updated on 6/12/2023 to more accurately reflect our healthy population. There may be differences in the flagging of prior results with similar values performed with this method. Interpretation of those prior results can be made in the context of the updated reference intervals.   11/07/2020 28 0 - 40 U/L Final     Albumin   Date Value Ref Range Status   05/13/2024 4.4 3.5 - 5.2 g/dL Final   11/07/2020 4.0 3.5 - 5.0 g/dL Final   11/15/2018 3.7 3.5 - 5.0 g/dL Final   08/13/2018 3.9 3.5 - 5.0  g/dL Final     Alkaline Phosphatase   Date Value Ref Range Status   05/13/2024 96 40 - 150 U/L Final     Comment:     Reference intervals for this test were updated on 11/14/2023 to more accurately reflect our healthy population. There may be differences in the flagging of prior results with similar values performed with this method. Interpretation of those prior results can be made in the context of the updated reference intervals.   11/07/2020 87 45 - 120 U/L Final     ALT   Date Value Ref Range Status   05/13/2024 13 0 - 50 U/L Final     Comment:     Reference intervals for this test were updated on 6/12/2023 to more accurately reflect our healthy population. There may be differences in the flagging of prior results with similar values performed with this method. Interpretation of those prior results can be made in the context of the updated reference intervals.     11/07/2020 27 0 - 45 U/L Final   11/15/2018 25 0 - 45 U/L Final     Rheumatoid Factor   Date Value Ref Range Status   07/09/2024 127 (H) <14 IU/mL Final          Immunization History     Immunization History   Administered Date(s) Administered    COVID-19 Bivalent 18+ (Moderna) 11/05/2022    COVID-19 Monovalent 18+ (Moderna) 03/02/2021, 03/30/2021, 11/28/2021    DT (PEDS <7y) 12/26/1995, 07/15/2005    Flu, Unspecified 10/08/2015    HepA, Unspecified 06/23/2011, 01/15/2014    Hepatitis A (ADULT 19+) 06/23/2011, 01/15/2014    Influenza Vaccine 18-64 (Flublok) 01/15/2014, 11/16/2020    Influenza Vaccine 65+ (Fluzone HD) 11/05/2022    Influenza Vaccine >6 months,quad, PF 01/15/2014, 01/15/2014, 10/19/2018    Influenza Vaccine, 6+MO IM (QUADRIVALENT W/PRESERVATIVES) 10/08/2015    TDAP (Adacel,Boostrix) 06/23/2011    Td (Adult), Adsorbed 07/15/2005    Td,adult,historic,unspecified 07/15/2005

## 2024-08-07 ENCOUNTER — HOSPITAL ENCOUNTER (OUTPATIENT)
Dept: RADIOLOGY | Facility: CLINIC | Age: 69
Discharge: HOME | End: 2024-08-07
Attending: INTERNAL MEDICINE | Admitting: INTERNAL MEDICINE
Payer: COMMERCIAL

## 2024-08-07 DIAGNOSIS — M25.461 EFFUSION OF RIGHT KNEE: ICD-10-CM

## 2024-08-07 PROCEDURE — 73560 X-RAY EXAM OF KNEE 1 OR 2: CPT | Mod: 50

## 2024-08-07 PROCEDURE — 73130 X-RAY EXAM OF HAND: CPT | Mod: 50

## 2024-08-11 LAB
BACTERIA SNV CULT: NO GROWTH
GRAM STAIN RESULT: NORMAL
GRAM STAIN RESULT: NORMAL

## 2024-08-16 ENCOUNTER — PATIENT OUTREACH (OUTPATIENT)
Dept: ONCOLOGY | Facility: HOSPITAL | Age: 69
End: 2024-08-16
Payer: COMMERCIAL

## 2024-08-16 NOTE — PROGRESS NOTES
[facility]: Cancer Care                                                                                            Call to Clarisse to review plan. Clarisse was to have blood work, including T cell gene rearrangement on blood, done at the end of the month. Per Dr. Webber, Clarisse should have an appointment with him after that results. Lab staff reports that blood work takes about 10 business days to result.     Reached out to Clarisse to help coordinate scheduling. It appears Clarisse has canceled her future lab appointments and is scheduled to RTC with Dr. Webber on 11/24/24.     Per Clarisse, she does not want to schedule any future appointment until she verfies that insurance will cover services. Number for financial services department was provided to Clarisse.    [employer] message also sent with financial services number and clinic contact information.      Signature:  Norma Ramon RN

## 2024-08-20 ENCOUNTER — PATIENT OUTREACH (OUTPATIENT)
Dept: ONCOLOGY | Facility: HOSPITAL | Age: 69
End: 2024-08-20
Payer: COMMERCIAL

## 2024-08-20 NOTE — PROGRESS NOTES
[facility]: Cancer Care                                                                                          1358 Called Clarisse and PAP[employer] that writer is calling to follow up, contact information was provided and return call requested.    1523 Clarisse phoned back and [employer] for writer at 3:23.  1643 Called Clarisse and [employer] that writer was calling her back, Contact information provided and return call requested.    Of note, when Clarisse calls back follow up on her financial/insurance situation. She has cancelled all of her monthly lab only apts and did not want to get the T cell receptor gene rearrangemt PCR   Drawn prior to looking further into her insurance benefit. She will need a follow up with Dr. Webber a couple weeks after lab is drawn. Continue to monitor.    Signature:  Vanessa Mckay RN

## 2024-08-21 NOTE — PROGRESS NOTES
8/21/2024 Called Clarisse to follow up, call also placed on 8/20/24 but  unable to connect with [employer] Robb to call writer , contact information was provided and return call was requested.    Of note, when Clarisse calls back follow up on her financial/insurance situation. She has cancelled all of her monthly lab only apts and did not want to get the T cell receptor gene rearrangemt PCR   Drawn prior to looking further into her insurance benefit. She will need a follow up with Dr. Webber a couple weeks after lab is drawn. Continue to monitor.     Vanessa Mckay RN

## 2024-08-22 NOTE — PROGRESS NOTES
[facility]: Cancer Care                                                                                          Clarisse returned writer's call and relays that she has been distressed as her insurance has denied the claim for some of her path additional testing and it is quite costly. She does not know what to do. Inquired what they specifically denies and she is uncertain what test but it is one of the sprcialized lab tests for her pathology. She has not yet call her insurance. Instructed to call her insurance carrier today to inquire what test they denied and for what reason. She then can call the financial service center at  to have them look deeper into the issue. She has not gotten any other denials. She is not wanting to get the T cell receptor testing for her peripheral blood (there was one on her Bone Marrow)  due to financial concern/ not understanding her insurance benefit.. She did cancel all of her monthly lab tests due to fear of denial. Shared that her monthly labs are simply CBC and insurance has not denied these labs or her office visits so she should feel reassured about this.  Asked her to call writer with update after she talks with insurance and financial service center. Dr. Webber was hoping to see her in follow up a couple weeks a(fter lab is drawn.). Will discuss with Dr. Webber after she determines her insurance benefit and if she is willing to get the t-cell receptor blood test.       Signature:  Vanessa Mckay RN

## 2024-08-26 NOTE — PROGRESS NOTES
[facility]: Cancer Care                                                                                          8/26/24 4791 Spoke with Clarisse and she relayed that she spoke with her insurance and thinks that is is the Myeloid Malignancy Comprehensive NGS Panel  that was not covered. Her insurance stated that there was not  PA for this test so they/Clarisse is not responsible, provider is.  Denial may be related to a coding issue as they did list diagnosis that were covered but medicare does not seem to cover the diagnosis code that was entered. Directed her to call Financial Service Center at [place] to discuss her billing concerns as it is their job to assist with following up with denies claims, coding issues, etc.  Shared that although she is having difficulty with the paying of this test, she has not been denied payment of her regular labs and follow up apt. Did encourage rescheduling. She is still apprehensive about the T cell receptor peripheral blood test that was ordered in light of recent denial claim of path additional testing. Uncertainty about which test this was exactly (NGS or T receptor on bone marrow bx). She will call billing and then follow up with writer, need to get her rescheduled for follow up.    Signature:  Vanessa Mckay RN

## 2024-08-27 ENCOUNTER — PATIENT OUTREACH (OUTPATIENT)
Dept: ONCOLOGY | Facility: HOSPITAL | Age: 69
End: 2024-08-27
Payer: COMMERCIAL

## 2024-08-27 NOTE — PROGRESS NOTES
[facility]: Cancer Care                                                                                          Called Clarisse to follow up, spoke with her on 8/26 about insurance concerns over testing that was not covered by her insurance. She did call her insurance company, [employer], and was able to work out one of her bills and she still has one that is pending. She is planning to call them back again today to follow up on the bill and also has the [facility] number for [place] for any billing questions.  Did relay that writer spoke with Dr Webber today and relayed that she is having some insurance coverage issues. She can elect not to have the T cell receptor gene rearrangemt  for her blood at this time, but he does not want her to delay her care. He would recommends that she come to clinic for lab (CBC) and follow up to review the results of the lab tests that have been drawn. Reminded her that she has not had insurance coverage issues for CBC and provider visits, only for the complex labs that were ordered on her pathology. She has cancelled all of her future apts as she is nervous about billing. Encouraged her to schedule lab/follow up on  9/4 when we have an opening and she can continue to work with her insurance in the meantime. If she feels that she cannot keep the 9/4 apt closer to that date, we can reschedule but writer encourages her to schedule and follow up. She voiced agreement and was scheduled. She will phone writer with update after talking with insurance. She does understand that writer does not specialize in billing and did direct her to [facility] and [facility], but happy for her to call with any updates as they impact her care. She has writer's contact information for future reference.    Signature:  Vanessa Mckay RN

## 2024-09-03 ENCOUNTER — TELEPHONE (OUTPATIENT)
Dept: ONCOLOGY | Facility: HOSPITAL | Age: 69
End: 2024-09-03
Payer: COMMERCIAL

## 2024-09-03 NOTE — TELEPHONE ENCOUNTER
Patient calls with concerns regarding a letter for denial of coverage that she received from her insurance. The denial is for code # 23229, targeted genomic sequence analysis panel. Patient is wondering if a referral or prior authorization was placed. She is advised that the provider will order then test and then this goes to the billing department in regards to authorization needs. Patient reports that she did speak with billing and she was directed to call the clinic and speak with nursing staff.    Scarlett Leslie RN

## 2024-09-03 NOTE — TELEPHONE ENCOUNTER
[facility]: Cancer Care                                                                                        Called Clarisse to follow up on her questions after she spoke with Scarlett Frank RN this morning. She did call [employer] last week but is unclear what the outcome of the conversation was, she does have a 6 page letter from them explaining the denial and that the testing was not covered by her Medicare. She did call [facility] but they are unable to assist her at this time as they do not have information about the denial.   Writer called [facility] and personally spoke with Evelyn, while Clarisse was on hold, to inquire further about her billing concerns. Evelyn relayed that it is not uncommon for patients to get a denial letter prior to [facility] receiving correspondence about a denial. At this time, they only see that there is payment pending from 7/15 but [facility] has not communicated anything about a denial. It does take some time for insurance to communicate denial of services, which was from DOS 7/15/24. Once  is notified of the denial of service, the denial team from [facility] will then communicate with [facility] to further investigate the denial and work toward rectifying (coding issue etc). Clarisse should call  Financial Service Center in one month if she has not heard anything further. Alternatively, if Clarisse prefers, she could conference call [facility] and Financial Service Center to discuss her case further at this time, but it may be more preferable to  let the system work and wait until a denial claim is  received by  and then let then allow the  denial team to work on her behalf with assisting to help with coverage.    She did cancel her 9.4 lab and follow up with Dr. Webber. Did relay that her CBCs and office visits have been covered at this time. She should feel confident with rescheduling her apt as we do need to review her past labs and we do need to continue to  monitor her labs. She was agreeable to rescheduling her apt to 9/10 for lab and follow up.  Update relayed to Dr. Webber regarding above/ delay of her follow up      Signature:  Vanessa Mckay RN

## 2024-09-20 ENCOUNTER — PATIENT OUTREACH (OUTPATIENT)
Dept: ONCOLOGY | Facility: HOSPITAL | Age: 69
End: 2024-09-20
Payer: COMMERCIAL

## 2024-09-20 NOTE — PROGRESS NOTES
[facility]: Cancer Care                                                                                          Call from Clarisse on nurse triage line. Clarisse reports that she has received a denial for referral/procedure from 7/30/24. Asking if a claim has been made, what the process is. Clarisse reports her number is [phone] and procedure # [id number]. Discussed with Vanessa Mckay, RNCC, who has discussed denials in the past with Clarisse. Per Vanessa Mckay, she will follow up with Clarisse regarding this matter.     Signature:  Norma Ramon RN

## 2024-09-20 NOTE — PROGRESS NOTES
[facility]: Cancer Care                                                                                          Clarisse called, she had cancelled her 9/10/24 lab and follow up apt after we spoke as she still had outstanding payment to [facility] that her insurance company had denied and she did not feel comfortable with  having any more apts with possible bills until her insurance issue was resolved/ She just got another notice from her insurance about denial for lab test from DOS 7/15. She does think that the 7/30/24 lab tests are now covered as she received a bill from [facility] indicating a balance of $0.   Writer called [facility]  with Clarisse on the line and spoke with a .   -From the DOS 7/30//24, the claim had been denies by her insurance company and was appealed by the [facility] denial tea. Ultimately, the bill was written off by [facility] on 8/27/24 and patient owes nothing from that date  -From DOS 7/15/24, there was a denial of a claim for a pathology lab in the amount of $433. The denial team did sent appeal to her insurance on 9/11/24 and they have a month to respond.   There are no other pending bills on her account at this time.  Reviewed the above with Eloisa. Did encourage her to reschedule lab and follow up with Dr. Webber. Did reiterate that there has been no past issue with coverage for CBC lab/follow up with Dr. Webber. Her ANC was 0.2 on 7/30 and plan was for monthly CBC lab draw. Dr. Webber does want her to return for monitoring of labs. Did explain the significance of low AND and that her WBC that fight infection are very low and she is at  risk for infection. He does want to have her labs monitored and does want to review the past lab testing. She was agreeable to schedule apt on 10/2/24 for CBC , does not want T cell receptor drawn on that date. She was transferred to scheduling to arrange  apt time. Encouraged her to keep future  apts.    Signature:  Vanessa Mckay RN

## 2024-10-02 ENCOUNTER — OFFICE VISIT (OUTPATIENT)
Dept: FAMILY MEDICINE | Facility: CLINIC | Age: 69
End: 2024-10-02
Payer: COMMERCIAL

## 2024-10-02 VITALS
BODY MASS INDEX: 19.44 KG/M2 | TEMPERATURE: 98.2 F | SYSTOLIC BLOOD PRESSURE: 112 MMHG | RESPIRATION RATE: 16 BRPM | WEIGHT: 121 LBS | OXYGEN SATURATION: 97 % | HEIGHT: 66 IN | HEART RATE: 68 BPM | DIASTOLIC BLOOD PRESSURE: 72 MMHG

## 2024-10-02 DIAGNOSIS — L98.9 SKIN LESION: Primary | ICD-10-CM

## 2024-10-02 PROCEDURE — 99213 OFFICE O/P EST LOW 20 MIN: CPT | Performed by: FAMILY MEDICINE

## 2024-10-02 ASSESSMENT — PAIN SCALES - GENERAL: PAINLEVEL: NO PAIN (0)

## 2024-10-02 NOTE — PROGRESS NOTES
"  Assessment & Plan     Skin lesion     - Adult Dermatology  Referral; Future                Subjective   Clarisse is a 68 year old, presenting for the following health issues:  Derm Problem (Bump on right side of nose getting bigger and also rough patch of skin right next to it. First noticed 2 months ago , no itching or pain )      10/2/2024     3:05 PM   Additional Questions   Roomed by ama barrett cma     History of Present Illness       Reason for visit:  Growth on the side of my nose that keeps increasing in size  Symptom onset:  More than a month  Symptom intensity:  Mild  Symptom progression:  Worsening  Had these symptoms before:  No   She is taking medications regularly.                 Review of Systems  Constitutional, HEENT, cardiovascular, pulmonary, gi and gu systems are negative, except as otherwise noted.      Objective    /72 (BP Location: Left arm, Patient Position: Sitting, Cuff Size: Adult Regular)   Pulse 68   Temp 98.2  F (36.8  C)   Resp 16   Ht 1.676 m (5' 6\")   Wt 54.9 kg (121 lb)   SpO2 97%   BMI 19.53 kg/m    Body mass index is 19.53 kg/m .  Physical Exam   GENERAL: alert and no distress  Skin:  see photo.        Media Information      Document Information    Other: Photograph   Lesion at right nares   10/02/2024 3:49 PM   Attached To:   Office Visit on 10/2/24 with Destiny Salinas MD   Source Information    Destiny Salinas MD  [facility]   Document History                Signed Electronically by: DESTINY SALINAS MD    "

## 2024-10-04 ENCOUNTER — TELEPHONE (OUTPATIENT)
Dept: DERMATOLOGY | Facility: CLINIC | Age: 69
End: 2024-10-04
Payer: COMMERCIAL

## 2024-10-04 NOTE — TELEPHONE ENCOUNTER
This encounter is being sent to inform the clinic that this patient has a referral from DESTINY SALINAS for the diagnoses of L98.9 (ICD-10-CM) - Skin lesion and has requested that this patient be seen within 1-2 Weeks.Based on the availability of our provider(s), we are unable to accommodate this request.    Were all sites offered this patient?  Yes    Does scheduling algorithm request to schedule next available?  Patient has been scheduled for the first available opening with Marquita Arthur on 5/13/25.  We have informed the patient that the clinic will review their referral and reach out if a sooner appointment is medically necessary.

## 2024-10-04 NOTE — TELEPHONE ENCOUNTER
Called and added to Dr. Arreola schedule for a pot only check- patient verbalized understanding that only this spot on nose would be addressed.  Thank you,    Ai FRIASRN BSN  [facility] Dermatology- [phone]

## 2024-10-08 ENCOUNTER — PATIENT OUTREACH (OUTPATIENT)
Dept: ONCOLOGY | Facility: HOSPITAL | Age: 69
End: 2024-10-08
Payer: COMMERCIAL

## 2024-10-08 NOTE — TELEPHONE ENCOUNTER
[facility]: Cancer Care                                                                                          Called Clarisse to follow up on apt scheduling. LM to call writer, contact information provided and return call requested,    Of note, on 9/20 conversation, she expressed concerns about scheduling future apts due to pending insurance coverage for past lab testing from her bone marrow biopsy. Writer Did reiterate to her that she has not had  difficulty in the past with coverage for provider visits/CBCs. Encouraged her to return to clinic for monitoring of her labs as se has hx of low ANC. She had agreed to reschedule her apt but then told schedulers she would call back at another time to schedule. Inquire if she would feel comfortable with scheduling at this time, any other barriers, etc.refer to encounter from 9/20 for full details.    Signature:  Vanessa Mckay RN

## 2024-10-24 ENCOUNTER — PATIENT OUTREACH (OUTPATIENT)
Dept: ONCOLOGY | Facility: HOSPITAL | Age: 69
End: 2024-10-24
Payer: COMMERCIAL

## 2024-10-24 NOTE — PROGRESS NOTES
[facility]: Cancer Care                                                                                          Called Clarisse to follow up on apt scheduling. LM to call writer, contact information provided and return call invited.      Of note, on 9/20 conversation, she expressed concerns about scheduling future apts due to pending insurance coverage for past lab testing from her bone marrow biopsy. Writer Did reiterate to her that she has not had  difficulty in the past with coverage for provider visits/CBCs. Encouraged her to return to clinic for monitoring of her labs as se has hx of low ANC. She had agreed to reschedule her apt but then told schedulers she would call back at another time to schedule. Inquire if she would feel comfortable with scheduling at this time, any other barriers, etc.refer to encounter from 9/20 for full details. Also made attempt to reach patient on 10/8/24     Signature:  Vanessa Mckay RN

## 2024-10-29 NOTE — PATIENT INSTRUCTIONS
Your urinalysis does indicate a urinary tract infection.  Please take macrobid - 1 tablet by mouth twice daily for 7 days.  I would expect that your symptoms should be beginning to improve within 48 hrs of starting the antibiotic. Please let me know if you are not improving as expected.  I will also let you know the results of the urine culture once it returns.   
Opt out

## 2024-11-01 ENCOUNTER — PATIENT OUTREACH (OUTPATIENT)
Dept: ONCOLOGY | Facility: HOSPITAL | Age: 69
End: 2024-11-01
Payer: COMMERCIAL

## 2024-11-01 NOTE — PROGRESS NOTES
[facility]: Cancer Care                                                                                          Called Clarisse to follow up  and Lm requesting return call. Wanting to follow up on insurance resolution and thoughts about scheduling future apt. contact information provided and return call invited.      Of note, on 9/20 conversation, she expressed concerns about scheduling future apts due to pending insurance coverage for past lab testing from her bone marrow biopsy. Writer Did reiterate to her that she has not had  difficulty in the past with coverage for provider visits/CBCs. Encouraged her to return to clinic for monitoring of her labs as se has hx of low ANC. She had agreed to reschedule her apt but then told schedulers she would call back at another time to schedule. Inquire if she would feel comfortable with scheduling at this time, any other barriers, etc.refer to encounter from 9/20 for full details. Also made attempt to reach patient on 10/8/24     Signature:  Vanessa Mckay RN

## 2024-11-05 LAB
PATH REPORT.ADDENDUM SPEC: NORMAL
PATH REPORT.ADDENDUM SPEC: NORMAL
PATH REPORT.COMMENTS IMP SPEC: NORMAL
PATH REPORT.COMMENTS IMP SPEC: NORMAL
PATH REPORT.FINAL DX SPEC: NORMAL
PATH REPORT.GROSS SPEC: NORMAL
PATH REPORT.MICROSCOPIC SPEC OTHER STN: NORMAL
PATH REPORT.MICROSCOPIC SPEC OTHER STN: NORMAL
PATH REPORT.RELEVANT HX SPEC: NORMAL

## 2024-11-11 ENCOUNTER — PATIENT OUTREACH (OUTPATIENT)
Dept: ONCOLOGY | Facility: HOSPITAL | Age: 69
End: 2024-11-11
Payer: COMMERCIAL

## 2024-11-11 NOTE — PROGRESS NOTES
[facility]: Cancer Care                                                                                        Clarisse returned call to writer. She has delayed scheduling lab and follow up apt as she has continued to work on insurance billing issues and wanted to have things settled with insurance prior to scheduling additional apts. She just got off of the phone with [facility] and she thinks that there may just be  a few hundred dollars remaining. She would like to talk with the  [facility] about her bill prior to scheduling an apt. She will call writer with an update after  she speaks with them. She reports that she is feeling well and has not had any recent infections. She was encouraged to schedule future lab/follow up apt as she has not had any difficulty with insurance covering these apts. The high balance in the past was related to lab testing for bone marrow biopsy and payment on this has since been resolved. She will call our office to schedule lab/follow up after she talking with  billing as this is her financial comfort level. Of note, she was warm transferred and there is no balance at this time. Will await a return phone call from savannah Robb monitor.    Signature:  Vanessa Mckay RN

## 2024-11-13 ENCOUNTER — OFFICE VISIT (OUTPATIENT)
Dept: DERMATOLOGY | Facility: CLINIC | Age: 69
End: 2024-11-13
Payer: COMMERCIAL

## 2024-11-13 DIAGNOSIS — L57.8 ACTINIC SKIN DAMAGE: ICD-10-CM

## 2024-11-13 DIAGNOSIS — D48.9 NEOPLASM OF UNCERTAIN BEHAVIOR: ICD-10-CM

## 2024-11-13 DIAGNOSIS — L98.9 SKIN LESION: ICD-10-CM

## 2024-11-13 DIAGNOSIS — L81.4 LENTIGINES: Primary | ICD-10-CM

## 2024-11-13 NOTE — PATIENT INSTRUCTIONS
Wound Care After a Biopsy    What is a skin biopsy?  A skin biopsy allows the doctor to examine a very small piece of tissue under the microscope to determine the diagnosis and the best treatment for the skin condition. A local anesthetic (numbing medicine) is injected with a very small needle into the skin area to be tested. A small piece of skin is taken from the area. Sometimes a suture (stitch) is used.     What are the risks of a skin biopsy?  I will experience scar, bleeding, swelling, pain, crusting and redness. I may experience incomplete removal or recurrence. Risks of this procedure are excessive bleeding, bruising, infection, nerve damage, numbness, thick (hypertrophic or keloidal) scar and non-diagnostic biopsy.    How should I care for my wound for the first 24 hours?  Keep the wound dry and covered for 24 hours  If it bleeds, hold direct pressure on the area for 15 minutes. If bleeding does not stop, call us or go to the emergency room  Avoid strenuous exercise the first 1-2 days or as your doctor instructs you    How should I care for the wound after 24 hours?  After 24 hours, remove the bandage  You may bathe or shower as normal  If you had a scalp biopsy, you can shampoo as usual and can use shower water to clean the biopsy site daily  Clean the wound once a day with gentle soap and water  Do not scrub, be gentle  Apply white petroleum/Vaseline after cleaning the wound with a cotton swab or a clean finger, and keep the site covered with a Bandaid /bandage. Bandages are not necessary with a scalp biopsy  If you are unable to cover the site with a Bandaid /bandage, re-apply ointment 2-3 times a day to keep the site moist. Moisture will help with healing  Avoid strenuous activity for first 1-2 days  Avoid lakes, rivers, pools, and oceans until the stitches are removed or the site is healed    How do I clean my wound?  Wash hands thoroughly with soap or use hand  before all wound care  Clean  the wound with gentle soap and water  Apply white petroleum/Vaseline  to wound after it is clean  Replace the Bandaid /bandage to keep the wound covered for the first few days or as instructed by your doctor  If you had a scalp biopsy, warm shower water to the area on a daily basis should suffice    What should I use to clean my wound?   Cotton-tipped applicators (Qtips )  White petroleum jelly (Vaseline ). Use a clean new container and use Q-tips to apply.  Bandaids  as needed  Gentle soap     How should I care for my wound long term?  Do not get your wound dirty  Keep up with wound care for one week or until the area is healed.  A small scab will form and fall off by itself when the area is completely healed. The area will be red and will become pink in color as it heals. Sun protection is very important for how your scar will turn out. Sunscreen with an SPF 30 or greater is recommended once the area is healed.  You should have some soreness but it should be mild and slowly go away over several days. Talk to your doctor about using tylenol for pain,    When should I call my doctor?  If you have increased:   Pain or swelling  Pus or drainage (clear or slightly yellow drainage is ok)  Temperature over 100F  Spreading redness or warmth around wound    When will I hear about my results?  The biopsy results can take 2 weeks to come back.  Your results will automatically release to [facility] before your provider has even reviewed them.  The clinic will call you with the results, send you a [facility] message, or have you schedule a follow-up clinic or phone time to discuss the results.  Contact our clinics if you do not hear from us in 2 weeks.    Who should I call with questions?  [facility]: [phone]  [facility]: [phone]  For urgent needs outside of business hours call the [facility] at [phone] and ask for the dermatology resident on call

## 2024-11-13 NOTE — PROGRESS NOTES
I have personally examined this patient and agree with thePA's documentation and plan of care. I have reviewed and amended the PA's note. The documentation accurately reflects my clinical observations, diagnoses, treatment and follow-up plans. I was present for key portions of the procedure(s).       Andriy Zurita MD  Dermatology Staff      [facility] Dermatology Note  Encounter Date: Nov 13, 2024  Office Visit     Reviewed patients past medical history and pertinent chart review prior to patients visit today.     Dermatology Problem List:  # NUB, R nasal ala, biopsy by shave 11/13/24    Pertinent Medical History:  N/A    Social History:  N/A  ____________________________________________    Assessment & Plan:     #Lentigines  #Actinic skin damage  - Reviewed the compounding benefits of incremental changes to sun protective clothing behaviors including increased frequency of sunscreen and sun protective clothing like broad brimmed hats and longsleeved UPF containing clothing  - rec FBSE    # NUB, R nasal ala. Ddx BCC vs other    Procedure Note: Biopsy by shave technique  The risks and benefits of the procedure were described to the patient. These include but are not limited to bleeding, infection, scar, incomplete removal, and non-diagnostic biopsy. Written informed consent was obtained. The R nasal ala was cleansed with an alcohol pad and injected with 1% lidocaine with epinephrine buffered with sodium bicarbonate. Once anesthesia was obtained, a biopsy was performed with Gilette blade. The tissue was placed in a labeled container with formalin and sent to pathology. Hemostasis was achieved with aluminum chloride . Vaseline and a bandage were applied to the wound. The patient tolerated the procedure well and was given post biopsy care instructions.    Follow-up: 3-6 months for FBSE, sooner for new or changing lesions    All risks, benefits and alternatives were discussed with patient.  Patient is  in agreement and understands the assessment and plan.  All questions were answered.    STAFF  Olegario Ricci PA-C  [facility] Dermatology    _______________________________________    CC: No chief complaint on file.  HPI:  Ms. Clarisse Dubon is a(n) 68 year old female who presents as a as a new patient.     spot on nose present for several years  enlarging in size   denies bleeding, pain  pcp recommended follow up with dermatology  no history of skin cancer/  Pt reports not utilizing with photoprotection    Patient is otherwise feeling well, without additional skin concerns.      Physical Exam:  SKIN: Focused examination of nose was performed.  [place] 1  - pearly papule with telangeictasia on R nasal ala     No other lesions of concern on areas examined.     Medications:  Current Outpatient Medications   Medication Sig Dispense Refill    acetaminophen (TYLENOL) 325 MG tablet Take 325-650 mg by mouth every 6 hours as needed for mild pain      DULoxetine (CYMBALTA) 20 MG capsule Take 1 capsule (20 mg) by mouth daily (Patient not taking: Reported on 10/2/2024) 90 capsule 0    ibuprofen (ADVIL/MOTRIN) 200 MG tablet Take 200 mg by mouth every 4 hours as needed for pain       No current facility-administered medications for this visit.      Past Medical History:   Patient Active Problem List   Diagnosis    Osteoarthritis Of The Knee    Bradycardia by electrocardiogram    Status post ablation of atrial fibrillation    Frequent urination    Muscle strain    Microscopic hematuria     Past Medical History:   Diagnosis Date    Arthritis     Atrial fibrillation (H)     Diagnosed 1/2/14.  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    Edema     due to arthritis    Essential Hypercholesterolemia     Dx July 1999       Facial cellulitis 06/03/2018    Foot pain, left 09/10/2015    Ganglion cyst of left foot 03/08/2023    Added automatically from request for surgery [id number]    Herpes simplex     type 1    History of transfusion     Hyperlipidemia     Insufficiency fracture of tibia 11/20/2015    Right ankle insufficiency fracture    Motion sickness     PONV (postoperative nausea and vomiting)     Rheumatoid arthritis (H)     Rheumatoid arthritis (H)     Sinus bradycardia     Ureteral stone     Left    Vitamin D deficiency        CC Referred Self, MD  No address on file on close of this encounter.

## 2024-11-13 NOTE — LETTER
11/13/2024      Clarisse Dubon  [address] [address] [address]      Dear Colleague,    Thank you for referring your patient, Clarisse Dubon, to the [facility]. Please see a copy of my visit note below.    I have personally examined this patient and agree with thePA's documentation and plan of care. I have reviewed and amended the PA's note. The documentation accurately reflects my clinical observations, diagnoses, treatment and follow-up plans. I was present for key portions of the procedure(s).       Andriy Zurita MD  Dermatology Staff      [facility] Dermatology Note  Encounter Date: Nov 13, 2024  Office Visit     Reviewed patients past medical history and pertinent chart review prior to patients visit today.     Dermatology Problem List:  # NUB, R nasal ala, biopsy by shave 11/13/24    Pertinent Medical History:  N/A    Social History:  N/A  ____________________________________________    Assessment & Plan:     #Lentigines  #Actinic skin damage  - Reviewed the compounding benefits of incremental changes to sun protective clothing behaviors including increased frequency of sunscreen and sun protective clothing like broad brimmed hats and longsleeved UPF containing clothing  - rec FBSE    # NUB, R nasal ala. Ddx BCC vs other    Procedure Note: Biopsy by shave technique  The risks and benefits of the procedure were described to the patient. These include but are not limited to bleeding, infection, scar, incomplete removal, and non-diagnostic biopsy. Written informed consent was obtained. The R nasal ala was cleansed with an alcohol pad and injected with 1% lidocaine with epinephrine buffered with sodium bicarbonate. Once anesthesia was obtained, a biopsy was performed with Gilette blade. The tissue was placed in a labeled container with formalin and sent to pathology. Hemostasis was achieved with aluminum chloride . Vaseline and a bandage were  applied to the wound. The patient tolerated the procedure well and was given post biopsy care instructions.    Follow-up: 3-6 months for FBSE, sooner for new or changing lesions    All risks, benefits and alternatives were discussed with patient.  Patient is in agreement and understands the assessment and plan.  All questions were answered.    STAFF  Olegario Ricci PA-C  [facility] Dermatology    _______________________________________    CC: No chief complaint on file.  HPI:  Ms. Clarisse Dubon is a(n) 68 year old female who presents as a as a new patient.     spot on nose present for several years  enlarging in size   denies bleeding, pain  pcp recommended follow up with dermatology  no history of skin cancer/  Pt reports not utilizing with photoprotection    Patient is otherwise feeling well, without additional skin concerns.      Physical Exam:  SKIN: Focused examination of nose was performed.  [place] 1  - pearly papule with telangeictasia on R nasal ala     No other lesions of concern on areas examined.     Medications:  Current Outpatient Medications   Medication Sig Dispense Refill     acetaminophen (TYLENOL) 325 MG tablet Take 325-650 mg by mouth every 6 hours as needed for mild pain       DULoxetine (CYMBALTA) 20 MG capsule Take 1 capsule (20 mg) by mouth daily (Patient not taking: Reported on 10/2/2024) 90 capsule 0     ibuprofen (ADVIL/MOTRIN) 200 MG tablet Take 200 mg by mouth every 4 hours as needed for pain       No current facility-administered medications for this visit.      Past Medical History:   Patient Active Problem List   Diagnosis     Osteoarthritis Of The Knee     Bradycardia by electrocardiogram     Status post ablation of atrial fibrillation     Frequent urination     Muscle strain     Microscopic hematuria     Past Medical History:   Diagnosis Date     Arthritis      Atrial fibrillation (H)     Diagnosed 1/2/14.  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     Edema     due to arthritis     Essential Hypercholesterolemia     Dx July 1999        Facial cellulitis 06/03/2018     Foot pain, left 09/10/2015     Ganglion cyst of left foot 03/08/2023    Added automatically from request for surgery [id number]     Herpes simplex     type 1     History of transfusion      Hyperlipidemia      Insufficiency fracture of tibia 11/20/2015    Right ankle insufficiency fracture     Motion sickness      PONV (postoperative nausea and vomiting)      Rheumatoid arthritis (H)      Rheumatoid arthritis (H)      Sinus bradycardia      Ureteral stone     Left     Vitamin D deficiency        CC Referred Self, MD  No address on file on close of this encounter.     Again, thank you for allowing me to participate in the care of your patient.        Sincerely,        Andriy Zurita MD

## 2024-11-15 ENCOUNTER — TELEPHONE (OUTPATIENT)
Dept: DERMATOLOGY | Facility: CLINIC | Age: 69
End: 2024-11-15
Payer: COMMERCIAL

## 2024-11-15 NOTE — TELEPHONE ENCOUNTER
Patient Contact    Attempt # 1    Was call answered?  No    Left message on answering machine for patient to call back.    Diana HERNANDEZ RN  Dermatology   [phone]

## 2024-11-15 NOTE — TELEPHONE ENCOUNTER
[facility]    Phone Message    May a detailed message be left on voicemail: yes     Reason for Call: Patient was marked as no show for 11/13 appt and she was there - there are notes in the chart - please fix the appt so it doesn't show as no show. Please send [employer] (Inspect Manager)hart message when this has been fixed. thank you    Action Taken: Other: OX DERM    Travel Screening: Not Applicable     Date of Service:

## 2024-11-18 ENCOUNTER — TELEPHONE (OUTPATIENT)
Dept: DERMATOLOGY | Facility: CLINIC | Age: 69
End: 2024-11-18
Payer: COMMERCIAL

## 2024-11-18 LAB
PATH REPORT.COMMENTS IMP SPEC: ABNORMAL
PATH REPORT.COMMENTS IMP SPEC: ABNORMAL
PATH REPORT.COMMENTS IMP SPEC: YES
PATH REPORT.FINAL DX SPEC: ABNORMAL
PATH REPORT.GROSS SPEC: ABNORMAL
PATH REPORT.MICROSCOPIC SPEC OTHER STN: ABNORMAL
PATH REPORT.RELEVANT HX SPEC: ABNORMAL

## 2024-11-18 NOTE — TELEPHONE ENCOUNTER
[facility] Call Center    Phone Message    May a detailed message be left on voicemail: no    Reason for Call: Symptoms or Concerns     If patient has red-flag symptoms, warm transfer to triage line    Current symptom or concern: The area that had a biopsy is now turning pink-esh/red    Symptoms have been present for:  2 day(s)    Has patient previously been seen for this? Yes    By: Dr Zurita    Date: 11/13/2024    Are there any new or worsening symptoms? No / Same as yesterday    Action Taken: Message routed to:  Other:  [facility]    Travel Screening: Not Applicable

## 2024-11-18 NOTE — TELEPHONE ENCOUNTER
Called and spoke with pt who denies all symptoms of infection. Advised pink/red is normal at this point.    Thank you,  Diana HERNANDEZ RN  Dermatology   [phone]

## 2024-11-19 ENCOUNTER — TELEPHONE (OUTPATIENT)
Dept: DERMATOLOGY | Facility: CLINIC | Age: 69
End: 2024-11-19
Payer: COMMERCIAL

## 2024-11-19 DIAGNOSIS — D48.9 NEOPLASM OF UNCERTAIN BEHAVIOR: Primary | ICD-10-CM

## 2024-11-19 NOTE — LETTER
[facility]  [address]  [address]  [phone]        11/19/2024       Clarisse Dubon  [address] [address] [address]      Dear Clarisse:    You are scheduled for Mohs Surgery on: 2/13/25 at 8:15 am.    Please check in at [facility], [address].     You don't need to arrive more than 5-10 minutes prior to your appointment time.     Be sure to eat a good breakfast and bathe and wash your hair prior to surgery.     If you are taking any anti-coagulants that are prescribed by your Doctor (such as Coumadin/Warfarin, Plavix, Aspirin, Ibuprofen), please continue taking them.     However, if you are taking anti-coagulants over the counter without a Doctor's order for a medical condition, please discontinue them 10 days prior to surgery.     Please wear loose comfortable clothing as it could possibly be 4-6 hours until your surgery is completed depending upon how many layers of tissue need to be removed.      Please bring  with you if this is above your neck.     If you need any mobility assistance (getting on the exam chair or toilet) please bring a caregiver, family member, or staff member to assist you. We are not equipped to transfer patients.      Thank you,    MARION Garcia MD

## 2024-11-19 NOTE — TELEPHONE ENCOUNTER
Patient Contact    Attempt # 1    Was call answered?  No    Called pt, no answer and no voicemail, will try back a;ltlater.    Thank you,  Diana HERNANDEZ RN  Dermatology   [phone]

## 2024-11-19 NOTE — TELEPHONE ENCOUNTER
----- Message from Andriy Zurita sent at 11/19/2024  8:32 AM CST -----  Please let pt know and refer for mohs     Final Diagnosis   Right nasal ala:  - Basal cell carcinoma, nodular type - (see description)

## 2024-11-19 NOTE — TELEPHONE ENCOUNTER
Called patient:  Patient notified and educated on test results   Mohs procedure explained- all questions answered  appointment scheduled- mohs packet mailed.     Thank you,  Diana HERNANDEZ RN  Dermatology   [phone]

## 2024-11-27 ENCOUNTER — TELEPHONE (OUTPATIENT)
Dept: DERMATOLOGY | Facility: CLINIC | Age: 69
End: 2024-11-27
Payer: COMMERCIAL

## 2024-11-27 NOTE — TELEPHONE ENCOUNTER
[facility] Call Center    Phone Message    May a detailed message be left on voicemail: yes     Reason for Call: Appointment Intake    Referring Provider Name: Dr. Garcia  Diagnosis and/or Symptoms: Reschedule the 2/13/25 MOHS    Action Taken: Other: [facility]    Travel Screening: Not Applicable     Date of Service:

## 2024-11-27 NOTE — TELEPHONE ENCOUNTER
Patient Contact    Attempt # 1    Was call answered?  No    Left message on answering machine for patient to call back.    Diana HERNANDEZ RN  Dermatology   [phone]

## 2024-12-17 ENCOUNTER — PATIENT OUTREACH (OUTPATIENT)
Dept: CARE COORDINATION | Facility: CLINIC | Age: 69
End: 2024-12-17
Payer: COMMERCIAL

## 2024-12-26 ENCOUNTER — PATIENT OUTREACH (OUTPATIENT)
Dept: CARE COORDINATION | Facility: CLINIC | Age: 69
End: 2024-12-26
Payer: COMMERCIAL

## 2025-01-02 ENCOUNTER — PATIENT OUTREACH (OUTPATIENT)
Dept: ONCOLOGY | Facility: HOSPITAL | Age: 70
End: 2025-01-02
Payer: COMMERCIAL

## 2025-01-02 NOTE — PROGRESS NOTES
[facility]: Cancer Care                                                                                          Called Clarisse and Jovani that writer was calling to check in to see how she was doing and to invite rescheduling of follow up. Contact information was provided and return call requested. See note from 11/11/24 for further details. Previous concerns regarding billing prevented her from rescheduling. Billing issue had been resolved on 11/11 when we last spoke.  Will await a return call from Clarisse.    Will request that schedulers send out a letter to patient to schedule. Writer will make not further attempts to contact patient at this time as multiple attempts have been made and documented in the past.      Vanessa Mckay RN

## 2025-01-16 ENCOUNTER — TELEPHONE (OUTPATIENT)
Dept: DERMATOLOGY | Facility: CLINIC | Age: 70
End: 2025-01-16
Payer: COMMERCIAL

## 2025-01-16 NOTE — TELEPHONE ENCOUNTER
S/w pt and advised there is no sooner appt than 2/27/25 at [place] for mohs.  If wanted to go to [place] could get in as early as next week.  Advised her skin cancer is a basal cell skin cancer which is the most common type of skin cancer that people get.  We can wait 3-4 months to schedule a basal cell skin cancer.  Advised this is a slow growing skin cancer and will not spread to other parts of her body.  Pt states understanding and will keep appt on 2/27/25.    Fannie GONSALES RN  [facility] [place]  [phone]

## 2025-01-16 NOTE — TELEPHONE ENCOUNTER
[facility] Call Center    Phone Message    May a detailed message be left on voicemail: yes     Reason for Call: Appointment Intake    Referring Provider Name: na  Diagnosis and/or Symptoms: Pt. Is scheduled on 2/27/25 and is looking for an earlier appt for a mohs procedure. If not could pt be added to the wait list? pt would like a return call back to discuss waiting this long, pt is concerned with long wait. Thank you    Action Taken: Message routed to:  Other: [facility] derm    Travel Screening: Not Applicable     Date of Service:

## 2025-02-26 NOTE — PROGRESS NOTES
Surgical Office Location:  [facility]  [address] [place] [id number]

## 2025-02-27 ENCOUNTER — OFFICE VISIT (OUTPATIENT)
Dept: DERMATOLOGY | Facility: CLINIC | Age: 70
End: 2025-02-27
Attending: STUDENT IN AN ORGANIZED HEALTH CARE EDUCATION/TRAINING PROGRAM
Payer: COMMERCIAL

## 2025-02-27 DIAGNOSIS — L81.4 LENTIGO: ICD-10-CM

## 2025-02-27 DIAGNOSIS — D18.01 ANGIOMA OF SKIN: ICD-10-CM

## 2025-02-27 DIAGNOSIS — D23.9 DERMAL NEVUS: Primary | ICD-10-CM

## 2025-02-27 DIAGNOSIS — C44.311 BASAL CELL CARCINOMA (BCC) OF RIGHT ALA NASI: ICD-10-CM

## 2025-02-27 DIAGNOSIS — L82.1 SEBORRHEIC KERATOSES: ICD-10-CM

## 2025-02-27 NOTE — PROGRESS NOTES
Clarisse Dubon , a 69 year old year old female patient, I was asked to see by Dr. Zurita for basal cell carcinoma on right ala.  Patient has no other skin complaints today.  Remainder of the HPI, Meds, PMH, Allergies, FH, and SH was reviewed in chart.      Past Medical History:   Diagnosis Date    Arthritis     Atrial fibrillation (H)     Diagnosed 1/2/14.  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    Edema     due to arthritis    Essential Hypercholesterolemia     Dx July 1999       Facial cellulitis 06/03/2018    Foot pain, left 09/10/2015    Ganglion cyst of left foot 03/08/2023    Added automatically from request for surgery [id number]    Herpes simplex     type 1    History of transfusion     Hyperlipidemia     Insufficiency fracture of tibia 11/20/2015    Right ankle insufficiency fracture    Motion sickness     PONV (postoperative nausea and vomiting)     Rheumatoid arthritis (H)     Rheumatoid arthritis (H)     Sinus bradycardia     Ureteral stone     Left    Vitamin D deficiency        Past Surgical History:   Procedure Laterality Date    BIOPSY BREAST Right 09/26/2007    Biopsy Breast Percutaneous Needle Core 7;  Comments: Benign results    CARDIAC ELECTROPHYSIOLOGY MAPPING AND ABLATION  12/17/2014    Pulmonary vein isolation for AF    CARDIAC SURGERY  2012    cardiac ablation    CERVIX LESION DESTRUCTION  06/01/1990    for ARAM I    DILATION AND CURETTAGE      for menorrhagia, Hgb 5.7    EXCISE GANGLION FOOT Left 03/20/2023    Procedure: EXCISION, GANGLION CYST, left foot;  Surgeon: Trenton Acevedo DPM;  Location: [facility]    HYSTERECTOMY VAGINAL   07/25/2007    With A/P repair and enterocele repair for benign reasons    LAPAROSCOPIC HYSTERECTOMY TOTAL          Family History   Problem Relation Age of Onset    Heart Disease Mother     Diabetes Type 2  Mother     Hypertension Mother     Coronary Artery Disease Father     Heart Disease Father     Diabetes Type 2  Father     Hypertension Father     Atrial fibrillation Sister     Hypertension Brother     Coronary Artery Disease Brother     Leukemia Brother     Heart Disease Brother     Cerebrovascular Disease Sister     Lupus Sister     Colon Cancer Sister        Social History     Socioeconomic History    Marital status:      Spouse name: Not on file    Number of children: Not on file    Years of education: Not on file    Highest education level: Not on file   Occupational History    Not on file   Tobacco Use    Smoking status: Never     Passive exposure: Never    Smokeless tobacco: Never   Vaping Use    Vaping status: Never Used   Substance and Sexual Activity    Alcohol use: Not Currently    Drug use: No    Sexual activity: Yes     Partners: Male     Birth control/protection: None   Other Topics Concern    Parent/sibling w/ CABG, MI or angioplasty before 65F 55M? Yes   Social History Narrative    Not on file     Social Drivers of Health     Financial Resource Strain: Low Risk  (5/13/2024)    Financial Resource Strain     Within the past 12 months, have you or your family members you live with been unable to get utilities (heat, electricity) when it was really needed?: No   Food Insecurity: Low Risk  (5/13/2024)    Food Insecurity     Within the past 12 months, did you worry that your food would run out before you got money to buy more?: No     Within the past 12 months, did the food you bought just not last and you didn t have money to get more?: No   Transportation Needs: Low Risk  (5/13/2024)    Transportation Needs     Within the past 12 months, has lack of transportation kept you from medical  appointments, getting your medicines, non-medical meetings or appointments, work, or from getting things that you need?: No   Physical Activity: Insufficiently Active (5/13/2024)    Exercise Vital Sign     Days of Exercise per Week: 3 days     Minutes of Exercise per Session: 40 min   Stress: No Stress Concern Present (5/13/2024)    [place] citizen [place] [employer] - Occupational Stress Questionnaire     Feeling of Stress : Only a little   Social Connections: Unknown (5/13/2024)    Social Connection and Isolation Panel [NHANES]     Frequency of Communication with Friends and Family: Not on file     Frequency of Social Gatherings with Friends and Family: Once a week     Attends [employer]ism Services: Not on file     Active Member of Clubs or Organizations: Not on file     Attends Club or Organization Meetings: Not on file     Marital Status: Not on file   Interpersonal Safety: Low Risk  (5/13/2024)    Interpersonal Safety     Do you feel physically and emotionally safe where you currently live?: Yes     Within the past 12 months, have you been hit, slapped, kicked or otherwise physically hurt by someone?: No     Within the past 12 months, have you been humiliated or emotionally abused in other ways by your partner or ex-partner?: No   Housing Stability: Low Risk  (5/13/2024)    Housing Stability     Do you have housing? : Yes     Are you worried about losing your housing?: No       Outpatient Encounter Medications as of 2/27/2025   Medication Sig Dispense Refill    acetaminophen (TYLENOL) 325 MG tablet Take 325-650 mg by mouth every 6 hours as needed for mild pain      DULoxetine (CYMBALTA) 20 MG capsule Take 1 capsule (20 mg) by mouth daily (Patient not taking: Reported on 10/2/2024) 90 capsule 0    ibuprofen (ADVIL/MOTRIN) 200 MG tablet Take 200 mg by mouth every 4 hours as needed for pain       No facility-administered encounter medications on file as of 2/27/2025.             Review Of Systems  Skin: As  above  Eyes: negative  Ears/Nose/Throat: negative  Respiratory: No shortness of breath, dyspnea on exertion, cough, or hemoptysis  Cardiovascular: negative  Gastrointestinal: negative  Genitourinary: negative  Musculoskeletal: negative  Neurologic: negative  Psychiatric: negative  Hematologic/Lymphatic/Immunologic: negative  Endocrine: negative      O:   NAD, WDWN, Alert & Oriented, Mood & Affect wnl, Vitals stable   General appearance geovanna ii   Vitals stable   Alert, oriented and in no acute distress   R ala 8mm scaly papule    Stuck on papules and brown macules on trunk and ext    Red papules on trunk   Flesh colored papules on trunk          Eyes: Conjunctivae/lids:Normal     ENT: Lips, mucosa: normal    MSK:Normal    Cardiovascular: peripheral edema none    Pulm: Breathing Normal    Neuro/Psych: Orientation:Normal; Mood/Affect:Normal      A/P:  1. Seborrheic keratosis, lentigo, angioma, dermal nevus  2. R ala basal cell carcinoma   It was a pleasure speaking to Clarisse Dubon today.  Previous clinic  notes and pertinent laboratory tests were reviewed prior to Clarisse Dubon's visit.  Signs and Symptoms of skin cancer discussed with patient.  Patient encouraged to perform monthly skin exams.  UV precautions reviewed with patient.  Return to clinic 3 months  PROCEDURE NOTE  R ala   MOHS:   Location    The rationale for Mohs surgery was discussed with the patient and consent was obtained.  The risks and benefits as well as alternatives to therapy were discussed, in detail.  Specifically, the risks of infection, scarring, bleeding, prolonged wound healing, incomplete removal, allergy to anesthesia, nerve injury and recurrence were addressed.  Indication for Mohs was Location. Prior to the procedure, the treatment site was clearly identified and, if available, confirmed with previous photos and confirmed by the patient   All components of the Universal Protocol/PAUSE rule were completed.  The Mohs surgeon operated in  two distinct and integrated capacities as the surgeon and pathologist.      The area was prepped with Betasept.  A rim of normal appearing skin was marked circumferentially around the lesion.  The area was infiltrated with local anesthesia.  The tumor was first debulked to remove all clinically apparent tumor.  An incision following the standard Mohs approach was done and the specimen was oriented,mapped and placed in 1 block(s).  Each specimen was then chromacoded and processed in the Mohs laboratory using standard Mohs technique and submitted for frozen section histology.  Frozen section analysis showed no residual tumor but CLEAR MARGINS.      The tumor was excised using standard Mohs technique in 1 stages(s).  CLEAR MARGINS OBTAINED and Final defect size was 1.3 x 1.1 cm.     We discussed the options for wound management in full with the patient including risks/benefits/ possible outcomes.      REPAIR NASOLABIAL TRANSPOSITION FLAP AND CHEEK ADVANCEMENT FLAP: Due to geometric and functional constraints, a flap reconstruction was performed to reconstruct the defect, moreover, adjacent tissue was incised and carried over to close the defect in the following manner, further, Because of the location and full-thickness nature of the defect, and to avoid distortion, a nasolabial transposition flap with cheek advancement flap closure at the donor site was planned. After informed consent, the patient was prepped and draped in the usual fashion and local anesthesia was obtained. A Burow's triangle was excised superiorly into the inner canthus, and an incision was made inferiorly in the nasolabial fold to the lateral commissure of the mouth and then extended superiorly to elevate a nasolabial flap in the subcutaneous plane measuring 3.5 x 3 cm.      This left a large tight defect on the cheek and to close the donor site defect, a cheek advancement flap was elevated by undermining the skin of the cheek in the subcutaneous  plane laterally beyond the lateral canthus and below the orbicularis muscle of the eyelid above the orbital rim. This created a cheek flap measuring 3 x 2 cm. After hemostasis was obtained, the subcutaneous bulk was advanced medially; it was attached to the periosteum of the pyriform aperture of the maxilla and to the ascending portion of the maxilla. The remainder was closed along the nasolabial fold in a layered fashion.     Next attention was turned back to the nasolabial transposition flap. The primary defect on the nose was undermined. The nasolabial transposition flap was distally thinned, transposed into place, amputated at the tip to fit the defect, and sutured to the nose defect in a layered fashion using Vicryl and Fast Absorbing Plain Gut sutures.  EBL minimal; complications none; wound care routine.  The patient was discharged in good condition and will return in one week for wound evaluation.

## 2025-02-27 NOTE — LETTER
2/27/2025      Clarisse Dubon  [address] [address] [address]      Dear Colleague,    Thank you for referring your patient, Clarisse Dubon, to the [facility]. Please see a copy of my visit note below.    Surgical Office Location:  [facility]  [address] [place] [id number]      Clarisse Dubon , a 69 year old year old female patient, I was asked to see by Dr. Zurita for basal cell carcinoma on right ala.  Patient has no other skin complaints today.  Remainder of the HPI, Meds, PMH, Allergies, FH, and SH was reviewed in chart.      Past Medical History:   Diagnosis Date     Arthritis      Atrial fibrillation (H)     Diagnosed 1/2/14.  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     Edema     due to arthritis     Essential Hypercholesterolemia     Dx July 1999        Facial cellulitis 06/03/2018     Foot pain, left 09/10/2015     Ganglion cyst of left foot 03/08/2023    Added automatically from request for surgery [id number]     Herpes simplex     type 1     History of transfusion      Hyperlipidemia      Insufficiency fracture of tibia 11/20/2015    Right ankle insufficiency fracture     Motion sickness      PONV (postoperative nausea and vomiting)      Rheumatoid arthritis (H)      Rheumatoid arthritis (H)      Sinus bradycardia      Ureteral stone     Left     Vitamin D deficiency        Past Surgical History:   Procedure Laterality Date     BIOPSY BREAST Right 09/26/2007    Biopsy Breast Percutaneous Needle Core 7;  Comments: Benign results     CARDIAC ELECTROPHYSIOLOGY MAPPING AND ABLATION   12/17/2014    Pulmonary vein isolation for AF     CARDIAC SURGERY  2012    cardiac ablation     CERVIX LESION DESTRUCTION  06/01/1990    for ARAM I     DILATION AND CURETTAGE      for menorrhagia, Hgb 5.7     EXCISE GANGLION FOOT Left 03/20/2023    Procedure: EXCISION, GANGLION CYST, left foot;  Surgeon: Trenton Acevedo DPM;  Location: [facility]     HYSTERECTOMY VAGINAL  07/25/2007    With A/P repair and enterocele repair for benign reasons     LAPAROSCOPIC HYSTERECTOMY TOTAL          Family History   Problem Relation Age of Onset     Heart Disease Mother      Diabetes Type 2  Mother      Hypertension Mother      Coronary Artery Disease Father      Heart Disease Father      Diabetes Type 2  Father      Hypertension Father      Atrial fibrillation Sister      Hypertension Brother      Coronary Artery Disease Brother      Leukemia Brother      Heart Disease Brother      Cerebrovascular Disease Sister      Lupus Sister      Colon Cancer Sister        Social History     Socioeconomic History     Marital status:      Spouse name: Not on file     Number of children: Not on file     Years of education: Not on file     Highest education level: Not on file   Occupational History     Not on file   Tobacco Use     Smoking status: Never     Passive exposure: Never     Smokeless tobacco: Never   Vaping Use     Vaping status: Never Used   Substance and Sexual Activity     Alcohol use: Not Currently     Drug use: No     Sexual activity: Yes     Partners: Male     Birth control/protection: None   Other Topics Concern     Parent/sibling w/ CABG, MI or angioplasty before 65F 55M? Yes   Social History Narrative     Not on file     Social Drivers of Health     Financial Resource Strain: Low Risk  (5/13/2024)    Financial Resource Strain      Within the past 12 months, have you or your family members you live with been unable to get utilities (heat, electricity) when it was really needed?: No   Food Insecurity: Low  Risk  (5/13/2024)    Food Insecurity      Within the past 12 months, did you worry that your food would run out before you got money to buy more?: No      Within the past 12 months, did the food you bought just not last and you didn t have money to get more?: No   Transportation Needs: Low Risk  (5/13/2024)    Transportation Needs      Within the past 12 months, has lack of transportation kept you from medical appointments, getting your medicines, non-medical meetings or appointments, work, or from getting things that you need?: No   Physical Activity: Insufficiently Active (5/13/2024)    Exercise Vital Sign      Days of Exercise per Week: 3 days      Minutes of Exercise per Session: 40 min   Stress: No Stress Concern Present (5/13/2024)    [employer] [place] [employer] - Occupational Stress Questionnaire      Feeling of Stress : Only a little   Social Connections: Unknown (5/13/2024)    Social Connection and Isolation Panel [NHANES]      Frequency of Communication with Friends and Family: Not on file      Frequency of Social Gatherings with Friends and Family: Once a week      Attends Hoahaoism Services: Not on file      Active Member of Clubs or Organizations: Not on file      Attends Club or Organization Meetings: Not on file      Marital Status: Not on file   Interpersonal Safety: Low Risk  (5/13/2024)    Interpersonal Safety      Do you feel physically and emotionally safe where you currently live?: Yes      Within the past 12 months, have you been hit, slapped, kicked or otherwise physically hurt by someone?: No      Within the past 12 months, have you been humiliated or emotionally abused in other ways by your partner or ex-partner?: No   Housing Stability: Low Risk  (5/13/2024)    Housing Stability      Do you have housing? : Yes      Are you worried about losing your housing?: No       Outpatient Encounter Medications as of 2/27/2025   Medication Sig Dispense Refill     acetaminophen (TYLENOL)  325 MG tablet Take 325-650 mg by mouth every 6 hours as needed for mild pain       DULoxetine (CYMBALTA) 20 MG capsule Take 1 capsule (20 mg) by mouth daily (Patient not taking: Reported on 10/2/2024) 90 capsule 0     ibuprofen (ADVIL/MOTRIN) 200 MG tablet Take 200 mg by mouth every 4 hours as needed for pain       No facility-administered encounter medications on file as of 2/27/2025.             Review Of Systems  Skin: As above  Eyes: negative  Ears/Nose/Throat: negative  Respiratory: No shortness of breath, dyspnea on exertion, cough, or hemoptysis  Cardiovascular: negative  Gastrointestinal: negative  Genitourinary: negative  Musculoskeletal: negative  Neurologic: negative  Psychiatric: negative  Hematologic/Lymphatic/Immunologic: negative  Endocrine: negative      O:   NAD, WDWN, Alert & Oriented, Mood & Affect wnl, Vitals stable   General appearance geovanna ii   Vitals stable   Alert, oriented and in no acute distress   R ala 8mm scaly papule    Stuck on papules and brown macules on trunk and ext    Red papules on trunk   Flesh colored papules on trunk          Eyes: Conjunctivae/lids:Normal     ENT: Lips, mucosa: normal    MSK:Normal    Cardiovascular: peripheral edema none    Pulm: Breathing Normal    Neuro/Psych: Orientation:Normal; Mood/Affect:Normal      A/P:  1. Seborrheic keratosis, lentigo, angioma, dermal nevus  2. R ala basal cell carcinoma   It was a pleasure speaking to Clarisse Dubon today.  Previous clinic  notes and pertinent laboratory tests were reviewed prior to Clarisse Dubon's visit.  Signs and Symptoms of skin cancer discussed with patient.  Patient encouraged to perform monthly skin exams.  UV precautions reviewed with patient.  Return to clinic 3 months  PROCEDURE NOTE  R ala   MOHS:   Location    The rationale for Mohs surgery was discussed with the patient and consent was obtained.  The risks and benefits as well as alternatives to therapy were discussed, in detail.  Specifically, the  risks of infection, scarring, bleeding, prolonged wound healing, incomplete removal, allergy to anesthesia, nerve injury and recurrence were addressed.  Indication for Mohs was Location. Prior to the procedure, the treatment site was clearly identified and, if available, confirmed with previous photos and confirmed by the patient   All components of the Universal Protocol/PAUSE rule were completed.  The Mohs surgeon operated in two distinct and integrated capacities as the surgeon and pathologist.      The area was prepped with Betasept.  A rim of normal appearing skin was marked circumferentially around the lesion.  The area was infiltrated with local anesthesia.  The tumor was first debulked to remove all clinically apparent tumor.  An incision following the standard Mohs approach was done and the specimen was oriented,mapped and placed in 1 block(s).  Each specimen was then chromacoded and processed in the Mohs laboratory using standard Mohs technique and submitted for frozen section histology.  Frozen section analysis showed no residual tumor but CLEAR MARGINS.      The tumor was excised using standard Mohs technique in 1 stages(s).  CLEAR MARGINS OBTAINED and Final defect size was 1.3 x 1.1 cm.     We discussed the options for wound management in full with the patient including risks/benefits/ possible outcomes.      REPAIR NASOLABIAL TRANSPOSITION FLAP AND CHEEK ADVANCEMENT FLAP: Due to geometric and functional constraints, a flap reconstruction was performed to reconstruct the defect, moreover, adjacent tissue was incised and carried over to close the defect in the following manner, further, Because of the location and full-thickness nature of the defect, and to avoid distortion, a nasolabial transposition flap with cheek advancement flap closure at the donor site was planned. After informed consent, the patient was prepped and draped in the usual fashion and local anesthesia was obtained. A Burow's triangle  was excised superiorly into the inner canthus, and an incision was made inferiorly in the nasolabial fold to the lateral commissure of the mouth and then extended superiorly to elevate a nasolabial flap in the subcutaneous plane measuring 3.5 x 3 cm.      This left a large tight defect on the cheek and to close the donor site defect, a cheek advancement flap was elevated by undermining the skin of the cheek in the subcutaneous plane laterally beyond the lateral canthus and below the orbicularis muscle of the eyelid above the orbital rim. This created a cheek flap measuring 3 x 2 cm. After hemostasis was obtained, the subcutaneous bulk was advanced medially; it was attached to the periosteum of the pyriform aperture of the maxilla and to the ascending portion of the maxilla. The remainder was closed along the nasolabial fold in a layered fashion.     Next attention was turned back to the nasolabial transposition flap. The primary defect on the nose was undermined. The nasolabial transposition flap was distally thinned, transposed into place, amputated at the tip to fit the defect, and sutured to the nose defect in a layered fashion using Vicryl and Fast Absorbing Plain Gut sutures.  EBL minimal; complications none; wound care routine.  The patient was discharged in good condition and will return in one week for wound evaluation.        Again, thank you for allowing me to participate in the care of your patient.        Sincerely,        Tyler Garcia MD    Electronically signed

## 2025-03-05 ENCOUNTER — ALLIED HEALTH/NURSE VISIT (OUTPATIENT)
Dept: DERMATOLOGY | Facility: CLINIC | Age: 70
End: 2025-03-05
Payer: COMMERCIAL

## 2025-03-05 DIAGNOSIS — Z48.00 DRESSING CHANGE: Primary | ICD-10-CM

## 2025-03-05 NOTE — PROGRESS NOTES
Clarisse JARED Dubon comes into clinic today at the request of Jose Soni  Ordering Provider for Dressing Change      This service provided today was under the supervising provider of the day , who was available if needed.    Zane Espinoza     Pt returned to clinic for post surgery 1 week follow up bandage change. Pt has no complaints, denies pain. Bandage removed right ala, area cleansed with normal saline. Site is healing and wound edges approximating well. Reapplied new steri strips and paper tape.    Advised to watch for signs/sx of infection; spreading redness, drainage, odor, fever. Call or report promptly to clinic. Pt given written instructions and informed to rtc as needed. Patient verbalized understanding.

## 2025-03-14 ENCOUNTER — ALLIED HEALTH/NURSE VISIT (OUTPATIENT)
Dept: DERMATOLOGY | Facility: CLINIC | Age: 70
End: 2025-03-14
Payer: COMMERCIAL

## 2025-03-14 DIAGNOSIS — L08.0 PYODERMA: Primary | ICD-10-CM

## 2025-03-14 PROCEDURE — 87070 CULTURE OTHR SPECIMN AEROBIC: CPT

## 2025-03-14 PROCEDURE — 99207 PR NO CHARGE NURSE ONLY: CPT

## 2025-03-14 PROCEDURE — 87077 CULTURE AEROBIC IDENTIFY: CPT

## 2025-03-14 RX ORDER — CEPHALEXIN 500 MG/1
500 CAPSULE ORAL 3 TIMES DAILY
Qty: 21 CAPSULE | Refills: 0 | Status: SHIPPED | OUTPATIENT
Start: 2025-03-14 | End: 2025-03-21

## 2025-03-14 NOTE — PROGRESS NOTES
Clarisse Dubon comes into clinic today at the request of   Ordering Provider for Wound Check Action taken:    Pt has redness, drainage, and pain around the wound. Areas cleansed with normal saline and apply Aquaphor. Culture swab was order and antibiotics were prescribe.     This service provided today was under the supervising provider of the day Tania MILLER , who was available if needed.    Advised to watch for signs/sx of infection. Call or report promptly to clinic. Pt given written instructions and informed to rtc as needed. Patient verbalized understanding

## 2025-03-14 NOTE — PATIENT INSTRUCTIONS
Proper skin care from [facility]:    -Eliminate harsh soaps as they strip the natural oils from the skin, often resulting in dry itchy skin ( i.e. Dial, Zest, Bulgarian Spring)  -Use mild soaps such as Cetaphil or Dove Sensitive Skin in the shower. You do not need to use soap on arms, legs, and trunk every time you shower unless visibly soiled.   -Avoid hot or cold showers.  -After showering, lightly dry off and apply moisturizing within 2-3 minutes. This will help trap moisture in the skin.   -Aggressive use of a moisturizer at least 1-2 times a day to the entire body (including -Vanicream, Cetaphil, Aquaphor or Cerave) and moisturize hands after every washing.  -We recommend using moisturizers that come in a tub that needs to be scooped out, not a pump. This has more of an oil base. It will hold moisture in your skin much better than a water base moisturizer. The above recommended are non-pore clogging.      Wear a sunscreen with at least SPF 30 on your face, ears, neck and V of the chest daily. Wear sunscreen on other areas of the body if those areas are exposed to the sun throughout the day. Sunscreens can contain physical and/or chemical blockers. Physical blockers are less likely to clog pores, these include zinc oxide and titanium dioxide. Reapply every two hour and after swimming.     Sunscreen examples: https://www.ewg.org/sunscreen/    UV radiation  UVA radiation remains constant throughout the day and throughout the year. It is a longer wavelength than UVB and therefore penetrates deeper into the skin leading to immediate and delayed tanning, photoaging, and skin cancer. 70-80% of UVA and UVB radiation occurs between the hours of 10am-2pm.  UVB radiation  UVB radiation causes the most harmful effects and is more significant during the summer months. However, snow and ice can reflect UVB radiation leading to skin damage during the winter months as well. UVB radiation is responsible for tanning,  burning, inflammation, delayed erythema (pinkness), pigmentation (brown spots), and skin cancer.     I recommend self monthly full body exams and yearly full body exams with a dermatology provider. If you develop a new or changing lesion please follow up for examination. Most skin cancers are pink and scaly or pink and pearly. However, we do see blue/brown/black skin cancers.  Consider the ABCDEs of melanoma when giving yourself your monthly full body exam ( don't forget the groin, buttocks, feet, toes, etc). A-asymmetry, B-borders, C-color, D-diameter, E-elevation or evolving. If you see any of these changes please follow up in clinic. If you cannot see your back I recommend purchasing a hand held mirror to use with a larger wall mirror.       Checking for Skin Cancer  You can find cancer early by checking your skin each month. There are 3 kinds of skin cancer. They are melanoma, basal cell carcinoma, and squamous cell carcinoma. Doing monthly skin checks is the best way to find new marks or skin changes. Follow the instructions below for checking your skin.   The ABCDEs of checking moles for melanoma   Check your moles or growths for signs of melanoma using ABCDE:   Asymmetry: the sides of the mole or growth don t match  Border: the edges are ragged, notched, or blurred  Color: the color within the mole or growth varies  Diameter: the mole or growth is larger than 6 mm (size of a pencil eraser)  Evolving: the size, shape, or color of the mole or growth is changing (evolving is not shown in the images below)    Checking for other types of skin cancer  Basal cell carcinoma or squamous cell carcinoma have symptoms such as:     A spot or mole that looks different from all other marks on your skin  Changes in how an area feels, such as itching, tenderness, or pain  Changes in the skin's surface, such as oozing, bleeding, or scaliness  A sore that does not heal  New swelling or redness beyond the border of a  mole    Who s at risk?  Anyone can get skin cancer. But you are at greater risk if you have:   Fair skin, light-colored hair, or light-colored eyes  Many moles or abnormal moles on your skin  A history of sunburns from sunlight or tanning beds  A family history of skin cancer  A history of exposure to radiation or chemicals  A weakened immune system  If you have had skin cancer in the past, you are at risk for recurring skin cancer.   How to check your skin  Do your monthly skin checkups in front of a full-length mirror. Check all parts of your body, including your:   Head (ears, face, neck, and scalp)  Torso (front, back, and sides)  Arms (tops, undersides, upper, and lower armpits)  Hands (palms, backs, and fingers, including under the nails)  Buttocks and genitals  Legs (front, back, and sides)  Feet (tops, soles, toes, including under the nails, and between toes)  If you have a lot of moles, take digital photos of them each month. Make sure to take photos both up close and from a distance. These can help you see if any moles change over time.   Most skin changes are not cancer. But if you see any changes in your skin, call your doctor right away. Only he or she can diagnose a problem. If you have skin cancer, seeing your doctor can be the first step toward getting the treatment that could save your life.   Hiri last reviewed this educational content on 4/1/2019 2000-2020 [employer]. [address] [place] [address], [place] [address]. All rights reserved. This information is not intended as a substitute for professional medical care. Always follow your healthcare professional's instructions.       When should I call my doctor?  If you are worsening or not improving, please, contact us or seek urgent care as noted below.     Who should I call with questions (adults)?    [facility] [phone]  For urgent needs outside of business hours call the [facility] at  [phone] and ask for the dermatology resident on call to be paged  If this is a medical emergency and you are unable to reach an ER, Call [phone]      If you need a prescription refill, please contact your pharmacy. Refills are approved or denied by our Physicians during normal business hours, Monday through Friday.  Per office policy, refills will not be granted if you have not been seen within the past year (or sooner depending on the condition).

## 2025-03-16 LAB
BACTERIA SKIN AEROBE CULT: ABNORMAL
BACTERIA SKIN AEROBE CULT: ABNORMAL

## 2025-03-20 ENCOUNTER — OFFICE VISIT (OUTPATIENT)
Dept: FAMILY MEDICINE | Facility: CLINIC | Age: 70
End: 2025-03-20
Payer: COMMERCIAL

## 2025-03-20 ENCOUNTER — TELEPHONE (OUTPATIENT)
Dept: DERMATOLOGY | Facility: CLINIC | Age: 70
End: 2025-03-20

## 2025-03-20 VITALS
BODY MASS INDEX: 21.17 KG/M2 | DIASTOLIC BLOOD PRESSURE: 66 MMHG | RESPIRATION RATE: 16 BRPM | WEIGHT: 124 LBS | HEART RATE: 59 BPM | HEIGHT: 64 IN | SYSTOLIC BLOOD PRESSURE: 110 MMHG | OXYGEN SATURATION: 98 % | TEMPERATURE: 97.9 F

## 2025-03-20 DIAGNOSIS — Z01.818 PREOP GENERAL PHYSICAL EXAM: Primary | ICD-10-CM

## 2025-03-20 DIAGNOSIS — H35.341 MACULAR HOLE OF RIGHT EYE: ICD-10-CM

## 2025-03-20 NOTE — PROGRESS NOTES
Preoperative Evaluation  [facility]  [address], [facility] [address]  [place] [facility] [address]  Phone: [phone]  Fax: [phone]  Primary Provider: Scarlet Zuniga MD  Pre-op Performing Provider: Tiffany Ruiz MD  Mar 20, 2025             3/20/2025   Surgical Information   What procedure is being done? Victrectomy Right eye    Facility or Hospital where procedure/surgery will be performed: [facility]    Who is doing the procedure / surgery? unknown    Date of surgery / procedure: 3/25/25    Time of surgery / procedure: TBD    Where do you plan to recover after surgery? at home with family        Proxy-reported     Fax number for surgical facility: [phone]    Assessment & Plan     The proposed surgical procedure is considered LOW risk.    (Z01.818) Preop general physical exam  (primary encounter diagnosis)  Comment: pt will have Victrectomy Right eye   Plan: cleared for the planned surgery    (H35.341) Macular hole of right eye  Comment: pt has macular hole of right eye  Plan: she will have Victrectomy Right eye              - No identified additional risk factors other than previously addressed    Antiplatelet or Anticoagulation Medication Instructions   - We reviewed the medication list and the patient is not on an antiplatelet or anticoagulation medications.    Additional Medication Instructions  We reviewed the medication list and there are no chronic medications that need to be adjusted for this procedure.   - Herbal medications and vitamins: DO NOT TAKE 14 days prior to surgery.    Recommendation  Approval given to proceed with proposed procedure, without further diagnostic evaluation.    Lee Robb is a 69 year old, presenting for the following:  Pre-Op Exam          3/20/2025    12:58 PM   Additional Questions   Roomed by Gen BRIAN CMA     HPI: pt has macular hole of right eye. she will have Victrectomy Right eye             3/20/2025   Pre-Op Questionnaire   Have you ever had a heart attack or stroke? No    Have you ever had surgery on your heart or blood vessels, such as a stent placement, a coronary artery bypass, or surgery on an artery in your head, neck, heart, or legs? No    Do you have chest pain with activity? No    Do you have a history of heart failure? No    Do you currently have a cold, bronchitis or symptoms of other infection? No    Do you have a cough, shortness of breath, or wheezing? No    Do you or anyone in your family have previous history of blood clots? No    Do you or does anyone in your family have a serious bleeding problem such as prolonged bleeding following surgeries or cuts? No    Have you ever had problems with anemia or been told to take iron pills? (!) YES long time ago   Have you had any abnormal blood loss such as black, tarry or bloody stools, or abnormal vaginal bleeding? No    Have you ever had a blood transfusion? No    Are you willing to have a blood transfusion if it is medically needed before, during, or after your surgery? Yes    Have you or any of your relatives ever had problems with anesthesia? No    Do you have sleep apnea, excessive snoring or daytime drowsiness? No    Do you have any artifical heart valves or other implanted medical devices like a pacemaker, defibrillator, or continuous glucose monitor? No    Do you have artificial joints? No    Are you allergic to latex? No        Proxy-reported     Health Care Directive  Patient does not have a Health Care Directive: Discussed advance care planning with patient; however, patient declined at this time.    Preoperative Review of    reviewed - no record of controlled substances prescribed.      Status of Chronic Conditions:  See problem list for active medical problems.  Problems all longstanding and stable, except as noted/documented.  See ROS for pertinent symptoms related to these conditions.    Patient Active Problem List     Diagnosis Date Noted    Muscle strain 10/03/2018     Priority: Medium    Frequent urination 06/13/2018     Priority: Medium    Osteoarthritis Of The Knee      Priority: Medium     Created by Conversion  Replacement Utility updated for latest IMO load    Replacing diagnoses that were inactivated after the 10/1/2021 regulatory import.      Bradycardia by electrocardiogram      Priority: Medium     Created by Conversion        Microscopic hematuria 10/27/2015     Priority: Medium     R31.2 : Microscopic hematuria      Status post ablation of atrial fibrillation 12/17/2014     Priority: Medium     PVI December 17, 2014 (cryo--PVI + RA-CTI line)          Past Medical History:   Diagnosis Date    Arthritis     Atrial fibrillation (H)     Diagnosed 1/2/14.  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    Edema     due to arthritis    Essential Hypercholesterolemia     Dx July 1999       Facial cellulitis 06/03/2018    Foot pain, left 09/10/2015    Ganglion cyst of left foot 03/08/2023    Added automatically from request for surgery [id number]    Herpes simplex     type 1    History of transfusion     Hyperlipidemia     Insufficiency fracture of tibia 11/20/2015    Right ankle insufficiency fracture    Motion sickness     PONV (postoperative nausea and vomiting)     Rheumatoid arthritis (H)     Rheumatoid arthritis (H)     Sinus bradycardia     Ureteral stone     Left    Vitamin D deficiency      Past Surgical History:   Procedure Laterality Date    BIOPSY BREAST Right 09/26/2007    Biopsy Breast Percutaneous Needle Core 7;  Comments: Benign results    CARDIAC ELECTROPHYSIOLOGY MAPPING AND  "ABLATION  12/17/2014    Pulmonary vein isolation for AF    CARDIAC SURGERY  2012    cardiac ablation    CERVIX LESION DESTRUCTION  06/01/1990    for ARAM I    DILATION AND CURETTAGE      for menorrhagia, Hgb 5.7    EXCISE GANGLION FOOT Left 03/20/2023    Procedure: EXCISION, GANGLION CYST, left foot;  Surgeon: Trenton Acevedo DPM;  Location: [facility]    HYSTERECTOMY VAGINAL  07/25/2007    With A/P repair and enterocele repair for benign reasons    LAPAROSCOPIC HYSTERECTOMY TOTAL       Current Outpatient Medications   Medication Sig Dispense Refill    cephALEXin (KEFLEX) 500 MG capsule Take 1 capsule (500 mg) by mouth 3 times daily for 7 days. 21 capsule 0       No Known Allergies     Social History     Tobacco Use    Smoking status: Never     Passive exposure: Never    Smokeless tobacco: Never   Substance Use Topics    Alcohol use: Not Currently     Family History   Problem Relation Age of Onset    Heart Disease Mother     Diabetes Type 2  Mother     Hypertension Mother     Coronary Artery Disease Father     Heart Disease Father     Diabetes Type 2  Father     Hypertension Father     Atrial fibrillation Sister     Hypertension Brother     Coronary Artery Disease Brother     Leukemia Brother     Heart Disease Brother     Cerebrovascular Disease Sister     Lupus Sister     Colon Cancer Sister      History   Drug Use No             Review of Systems  Constitutional, HEENT, cardiovascular, pulmonary, GI, , musculoskeletal, neuro, skin, endocrine and psych systems are negative, except as otherwise noted.    Objective    /66 (BP Location: Right arm, Patient Position: Sitting, Cuff Size: Adult Regular)   Pulse 59   Temp 97.9  F (36.6  C) (Oral)   Resp 16   Ht 1.626 m (5' 4\")   Wt 56.2 kg (124 lb)   SpO2 98%   BMI 21.28 kg/m     Estimated body mass index is 21.28 kg/m  as calculated from the following:    Height as of this encounter: 1.626 m (5' 4\").    Weight as of this encounter: 56.2 kg (124 " lb).  Physical Exam  GENERAL: alert and no distress  EYES: Eyes grossly normal to inspection, PERRL and conjunctivae and sclerae normal  HENT: ear canals and TM's normal, nose and mouth without ulcers or lesions  NECK: no adenopathy, no asymmetry, masses, or scars  RESP: lungs clear to auscultation - no rales, rhonchi or wheezes  CV: regular rate and rhythm, normal S1 S2, no S3 or S4, no murmur, click or rub, no peripheral edema  ABDOMEN: soft, nontender, no hepatosplenomegaly, no masses and bowel sounds normal  MS: no gross musculoskeletal defects noted, no edema  SKIN: no suspicious lesions or rashes. The wound on right face is dry. No swelling, erythema, and tenderness.   NEURO: Normal strength and tone, mentation intact and speech normal  PSYCH: mentation appears normal, affect normal/bright    Recent Labs   Lab Test 07/30/24  0826 07/15/24  1021 07/09/24  1418 05/13/24  1318   HGB 11.8 11.7   < > 12.3   * 113*   < > 128*   NA  --   --   --  137   POTASSIUM  --   --   --  4.4   CR  --   --   --  0.72   A1C  --   --   --  4.8    < > = values in this interval not displayed.        Diagnostics  No labs were ordered during this visit.   No EKG required for low risk surgery (cataract, skin procedure, breast biopsy, etc).    Revised Cardiac Risk Index (RCRI)  The patient has the following serious cardiovascular risks for perioperative complications:   - No serious cardiac risks = 0 points     RCRI Interpretation: 0 points: Class I (very low risk - 0.4% complication rate)         Signed Electronically by: Tiffany Ruiz MD  A copy of this evaluation report is provided to the requesting physician.

## 2025-03-20 NOTE — PATIENT INSTRUCTIONS
How to Take Your Medication Before Surgery  Preoperative Medication Instructions   Antiplatelet or Anticoagulation Medication Instructions   - We reviewed the medication list and the patient is not on an antiplatelet or anticoagulation medications.    Additional Medication Instructions  We reviewed the medication list and there are no chronic medications that need to be adjusted for this procedure.   - Herbal medications and vitamins: DO NOT TAKE 14 days prior to surgery.       Patient Education   Preparing for Your Surgery  For Adults  Getting started  In most cases, a nurse will call to review your health history and instructions. They will give you an arrival time based on your scheduled surgery time. Please be ready to share:  Your doctor's clinic name and phone number  Your medical, surgical, and anesthesia history  A list of allergies and sensitivities  A list of medicines, including herbal treatments and over-the-counter drugs  Whether the patient has a legal guardian (ask how to send us the papers in advance)  Note: You may not receive a call if you were seen at our PAC (Preoperative Assessment Center).  Please tell us if you're pregnant--or if there's any chance you might be pregnant. Some surgeries may injure a fetus (unborn baby), so they require a pregnancy test. Surgeries that are safe for a fetus don't always need a test, and you can choose whether to have one.   Preparing for surgery  Within 10 to 30 days of surgery: Have a pre-op exam (sometimes called an H&P, or History and Physical). This can be done at a clinic or pre-operative center.  If you're having a , you may not need this exam. Talk to your care team.  At your pre-op exam, talk to your care team about all medicines you take. (This includes CBD oil and any drugs, such as THC, marijuana, and other forms of cannabis.) If you need to stop any medicine before surgery, ask when to start taking it again.  This is for your safety. Many  medicines and drugs can make you bleed too much during surgery. Some change how well surgery (anesthesia) drugs work.  Call your insurance company to let them know you're having surgery. (If you don't have insurance, call [phone].)  Call your clinic if there's any change in your health. This includes a scrape or scratch near the surgery site, or any signs of a cold (sore throat, runny nose, cough, rash, fever).  Eating and drinking guidelines  For your safety: Unless your surgeon tells you otherwise, follow the guidelines below.  Eat and drink as normal until 8 hours before you arrive for surgery. After that, no food or milk. You can spit out gum when you arrive.  Drink clear liquids until 2 hours before you arrive. These are liquids you can see through, like water, Gatorade, and Propel Water. They also include plain black coffee and tea (no cream or milk).  No alcohol for 24 hours before you arrive. The night before surgery, stop any drinks that contain THC.  If your care team tells you to take medicine on the morning of surgery, it's okay to take it with a sip of water. No other medicines or drugs are allowed (including CBD oil)--follow your care team's instructions.  If you have questions the day of surgery, call your hospital or surgery center.   Preventing infection  Shower or bathe the night before and the morning of surgery. Follow the instructions your clinic gave you. (If no instructions, use regular soap.)  Don't shave or clip hair near your surgery site. We'll remove the hair if needed.  Don't smoke or vape the morning of surgery. No chewing tobacco for 6 hours before you arrive. A nicotine patch is okay. You may spit out nicotine gum when you arrive.  For some surgeries, the surgeon will tell you to fully quit smoking and nicotine.  We will make every effort to keep you safe from infection. We will:  Clean our hands often with soap and water (or an alcohol-based hand rub).  Clean the skin at your  surgery site with a special soap that kills germs.  Give you a special gown to keep you warm. (Cold raises the risk of infection.)  Wear hair covers, masks, gowns, and gloves during surgery.  Give antibiotic medicine, if prescribed. Not all surgeries need this medicine.  What to bring on the day of surgery  Photo ID and insurance card  Copy of your health care directive, if you have one  Glasses and hearing aids (bring cases)  You can't wear contacts during surgery  Inhaler and eye drops, if you use them (tell us about these when you arrive)  CPAP machine or breathing device, if you use them  A few personal items, if spending the night  If you have . . .  A pacemaker, ICD (cardiac defibrillator), or other implant: Bring the ID card.  An implanted stimulator: Bring the remote control.  A legal guardian: Bring a copy of the certified (court-stamped) guardianship papers.  Please remove any jewelry, including body piercings. Leave jewelry and other valuables at home.  If you're going home the day of surgery  You must have a responsible adult drive you home. They should stay with you overnight as well.  If you don't have someone to stay with you, and you aren't safe to go home alone, we may keep you overnight. Insurance often won't pay for this.  After surgery  If it's hard to control your pain or you need more pain medicine, please call your surgeon's office.  Questions?   If you have any questions for your care team, list them here:   ____________________________________________________________________________________________________________________________________________________________________________________________________________________________________________________________  For informational purposes only. Not to replace the advice of your health care provider. Copyright   2003, 2019 [facility]. All rights reserved. Clinically reviewed by Kalyan Cisneros MD. [employer] [id number] - REV 08/24.

## 2025-03-20 NOTE — TELEPHONE ENCOUNTER
----- Message from Tania Collins sent at 3/17/2025 10:12 AM CDT -----  Culture grew bacteria but susceptibility testing not routinely done. How is she doing with the cephalexin?

## 2025-03-20 NOTE — TELEPHONE ENCOUNTER
Called and spoke with pt, she is doing well with the abx and things are getting better.    Diana HERNANDEZ RN  Dermatology   [phone]

## 2025-04-30 ENCOUNTER — OFFICE VISIT (OUTPATIENT)
Dept: INTERNAL MEDICINE | Facility: CLINIC | Age: 70
End: 2025-04-30
Payer: COMMERCIAL

## 2025-04-30 VITALS
RESPIRATION RATE: 20 BRPM | HEART RATE: 67 BPM | WEIGHT: 124.12 LBS | SYSTOLIC BLOOD PRESSURE: 112 MMHG | DIASTOLIC BLOOD PRESSURE: 70 MMHG | BODY MASS INDEX: 21.19 KG/M2 | HEIGHT: 64 IN | OXYGEN SATURATION: 97 % | TEMPERATURE: 98 F

## 2025-04-30 DIAGNOSIS — A60.04 HERPES SIMPLEX VULVOVAGINITIS: Primary | ICD-10-CM

## 2025-04-30 PROBLEM — R35.0 FREQUENT URINATION: Status: RESOLVED | Noted: 2018-06-13 | Resolved: 2025-04-30

## 2025-04-30 PROBLEM — T14.8XXA MUSCLE STRAIN: Status: RESOLVED | Noted: 2018-10-03 | Resolved: 2025-04-30

## 2025-04-30 PROCEDURE — 3074F SYST BP LT 130 MM HG: CPT | Performed by: INTERNAL MEDICINE

## 2025-04-30 PROCEDURE — G2211 COMPLEX E/M VISIT ADD ON: HCPCS | Performed by: INTERNAL MEDICINE

## 2025-04-30 PROCEDURE — 99214 OFFICE O/P EST MOD 30 MIN: CPT | Performed by: INTERNAL MEDICINE

## 2025-04-30 PROCEDURE — 3078F DIAST BP <80 MM HG: CPT | Performed by: INTERNAL MEDICINE

## 2025-04-30 RX ORDER — VALACYCLOVIR HYDROCHLORIDE 500 MG/1
500 TABLET, FILM COATED ORAL 2 TIMES DAILY
Qty: 6 TABLET | Refills: 0 | Status: SHIPPED | OUTPATIENT
Start: 2025-04-30 | End: 2025-05-03

## 2025-04-30 NOTE — PROGRESS NOTES
"  Assessment & Plan     Herpes simplex vulvovaginitis  Most likely give hx of previous outbreak and appearance on exam. Though symptoms started x5 days ago, will still start Valtrex to hopefully lessen duration. Advised to avoid intercourse until area has fully healed and no longer having pain.    - valACYclovir (VALTREX) 500 MG tablet; Take 1 tablet (500 mg) by mouth 2 times daily for 3 days.    Return for annual at earliest convenience     Subjective   Clarisse is a 69 year old, presenting for the following health issues:  Vaginal Problem (Not an UTI, genital part is red, swollen, irritated, uncomfortable.  )        4/30/2025     1:36 PM   Additional Questions   Roomed by GABE Tapia     History of Present Illness       Reason for visit:  Genital area burning itching uncomfortabke  Symptom onset:  3-7 days ago  Symptom intensity:  Moderate  Symptom progression:  Staying the same  Had these symptoms before:  Yes  Has tried/received treatment for these symptoms:  Yes  Previous treatment was successful:  Yes  Prior treatment description:  Unsure  What makes it worse:  No       Lots of burning and irritation of labia for the last 5 days   Area is red, swollen, and uncomfortable  Denies vaginal discharge, dysuria, rashes/sores  No new soaps or detergents  Has hx genital hernia but no outbreaks in 6-7 years   Tried some Vaseline and avoiding intercourse   No fevers or chills      Objective    /70   Pulse 67   Temp 98  F (36.7  C) (Oral)   Resp 20   Ht 1.626 m (5' 4.02\")   Wt 25.5 kg (56 lb 4.8 oz)   SpO2 97%   BMI 9.66 kg/m    Body mass index is 9.66 kg/m .  Physical Exam   General: alert, no acute distress, appears well  HEENT: normocephalic, moist mucus membranes  CV: normal peripheral perfusion  Respiratory: respirations are non-labored  Abdomen: non-distended  Skin: erythema of the right labia minora with what appears to be popped vesicles   Neuro: alert and oriented  Psych: cooperative with appropriate " mood and affect        Signed Electronically by: Elva Jeffries MD

## 2025-05-12 ENCOUNTER — PATIENT OUTREACH (OUTPATIENT)
Dept: CARE COORDINATION | Facility: CLINIC | Age: 70
End: 2025-05-12
Payer: COMMERCIAL

## 2025-05-26 ENCOUNTER — PATIENT OUTREACH (OUTPATIENT)
Dept: CARE COORDINATION | Facility: CLINIC | Age: 70
End: 2025-05-26
Payer: COMMERCIAL

## 2025-06-12 ENCOUNTER — TELEPHONE (OUTPATIENT)
Dept: FAMILY MEDICINE | Facility: CLINIC | Age: 70
End: 2025-06-12
Payer: COMMERCIAL

## 2025-06-12 NOTE — TELEPHONE ENCOUNTER
Patient Quality Outreach    Patient is due for the following:   Physical Annual Wellness Visit    Action(s) Taken:   Schedule a Annual Wellness Visit    Type of outreach:    Sent [employer] message.    Questions for provider review:    None         Scarlet Mott CMA  Chart routed to None.

## 2025-06-28 ENCOUNTER — HEALTH MAINTENANCE LETTER (OUTPATIENT)
Age: 70
End: 2025-06-28

## 2025-06-30 ENCOUNTER — TELEPHONE (OUTPATIENT)
Dept: FAMILY MEDICINE | Facility: CLINIC | Age: 70
End: 2025-06-30
Payer: COMMERCIAL

## 2025-07-21 ENCOUNTER — NURSE TRIAGE (OUTPATIENT)
Dept: FAMILY MEDICINE | Facility: CLINIC | Age: 70
End: 2025-07-21
Payer: COMMERCIAL

## 2025-07-21 ENCOUNTER — APPOINTMENT (OUTPATIENT)
Dept: CT IMAGING | Facility: CLINIC | Age: 70
End: 2025-07-21
Attending: STUDENT IN AN ORGANIZED HEALTH CARE EDUCATION/TRAINING PROGRAM
Payer: COMMERCIAL

## 2025-07-21 ENCOUNTER — HOSPITAL ENCOUNTER (EMERGENCY)
Facility: CLINIC | Age: 70
Discharge: HOME | End: 2025-07-21
Payer: COMMERCIAL

## 2025-07-21 VITALS
OXYGEN SATURATION: 99 % | TEMPERATURE: 97.5 F | WEIGHT: 115 LBS | BODY MASS INDEX: 19.16 KG/M2 | HEIGHT: 65 IN | HEART RATE: 57 BPM | RESPIRATION RATE: 16 BRPM | DIASTOLIC BLOOD PRESSURE: 64 MMHG | SYSTOLIC BLOOD PRESSURE: 136 MMHG

## 2025-07-21 DIAGNOSIS — D70.9 CHRONIC NEUTROPENIA: ICD-10-CM

## 2025-07-21 DIAGNOSIS — R10.84 GENERALIZED ABDOMINAL PAIN: ICD-10-CM

## 2025-07-21 LAB
ALBUMIN SERPL BCG-MCNC: 4.2 G/DL (ref 3.5–5.2)
ALBUMIN UR-MCNC: NEGATIVE MG/DL
ALP SERPL-CCNC: 85 U/L (ref 40–150)
ALT SERPL W P-5'-P-CCNC: 12 U/L (ref 0–50)
ANION GAP SERPL CALCULATED.3IONS-SCNC: 11 MMOL/L (ref 7–15)
APPEARANCE UR: CLEAR
AST SERPL W P-5'-P-CCNC: 20 U/L (ref 0–45)
ATRIAL RATE - MUSE: 55 BPM
BASOPHILS # BLD AUTO: 0 10E3/UL (ref 0–0.2)
BASOPHILS NFR BLD AUTO: 1 %
BILIRUB SERPL-MCNC: 0.4 MG/DL
BILIRUB UR QL STRIP: NEGATIVE
BILIRUBIN DIRECT (ROCHE PRO & PURE): 0.17 MG/DL (ref 0–0.45)
BUN SERPL-MCNC: 12.8 MG/DL (ref 8–23)
CALCIUM SERPL-MCNC: 9.2 MG/DL (ref 8.8–10.4)
CHLORIDE SERPL-SCNC: 105 MMOL/L (ref 98–107)
COLOR UR AUTO: COLORLESS
CREAT SERPL-MCNC: 0.68 MG/DL (ref 0.51–0.95)
DIASTOLIC BLOOD PRESSURE - MUSE: NORMAL MMHG
EGFRCR SERPLBLD CKD-EPI 2021: >90 ML/MIN/1.73M2
EOSINOPHIL # BLD AUTO: 0 10E3/UL (ref 0–0.7)
EOSINOPHIL NFR BLD AUTO: 1 %
ERYTHROCYTE [DISTWIDTH] IN BLOOD BY AUTOMATED COUNT: 13.1 % (ref 10–15)
GLUCOSE SERPL-MCNC: 96 MG/DL (ref 70–99)
GLUCOSE UR STRIP-MCNC: NEGATIVE MG/DL
HCO3 SERPL-SCNC: 25 MMOL/L (ref 22–29)
HCT VFR BLD AUTO: 36.3 % (ref 35–47)
HGB BLD-MCNC: 12 G/DL (ref 11.7–15.7)
HGB UR QL STRIP: ABNORMAL
HOLD SPECIMEN: NORMAL
IMM GRANULOCYTES # BLD: 0 10E3/UL
IMM GRANULOCYTES NFR BLD: 1 %
INTERPRETATION ECG - MUSE: NORMAL
KETONES UR STRIP-MCNC: NEGATIVE MG/DL
LEUKOCYTE ESTERASE UR QL STRIP: NEGATIVE
LIPASE SERPL-CCNC: 23 U/L (ref 13–60)
LYMPHOCYTES # BLD AUTO: 1 10E3/UL (ref 0.8–5.3)
LYMPHOCYTES NFR BLD AUTO: 68 %
MCH RBC QN AUTO: 28.3 PG (ref 26.5–33)
MCHC RBC AUTO-ENTMCNC: 33.1 G/DL (ref 31.5–36.5)
MCV RBC AUTO: 86 FL (ref 78–100)
MONOCYTES # BLD AUTO: 0.3 10E3/UL (ref 0–1.3)
MONOCYTES NFR BLD AUTO: 18 %
NEUTROPHILS # BLD AUTO: 0.2 10E3/UL (ref 1.6–8.3)
NEUTROPHILS NFR BLD AUTO: 11 %
NITRATE UR QL: NEGATIVE
NRBC # BLD AUTO: 0 10E3/UL
NRBC BLD AUTO-RTO: 0 /100
P AXIS - MUSE: 81 DEGREES
PH UR STRIP: 7.5 [PH] (ref 5–7)
PLAT MORPH BLD: NORMAL
PLATELET # BLD AUTO: 116 10E3/UL (ref 150–450)
POTASSIUM SERPL-SCNC: 3.6 MMOL/L (ref 3.4–5.3)
PR INTERVAL - MUSE: 140 MS
PROT SERPL-MCNC: 6.7 G/DL (ref 6.4–8.3)
QRS DURATION - MUSE: 98 MS
QT - MUSE: 476 MS
QTC - MUSE: 455 MS
R AXIS - MUSE: 26 DEGREES
RBC # BLD AUTO: 4.24 10E6/UL (ref 3.8–5.2)
RBC MORPH BLD: NORMAL
RBC URINE: 1 /HPF
SODIUM SERPL-SCNC: 141 MMOL/L (ref 135–145)
SP GR UR STRIP: <1.005 (ref 1–1.03)
SQUAMOUS EPITHELIAL: 2 /HPF
SYSTOLIC BLOOD PRESSURE - MUSE: NORMAL MMHG
T AXIS - MUSE: 33 DEGREES
TRANSITIONAL EPI: <1 /HPF
TROPONIN T SERPL HS-MCNC: 7 NG/L
UROBILINOGEN UR STRIP-MCNC: <2 MG/DL
VENTRICULAR RATE- MUSE: 55 BPM
WBC # BLD AUTO: 1.5 10E3/UL (ref 4–11)
WBC URINE: 0 /HPF

## 2025-07-21 PROCEDURE — 81001 URINALYSIS AUTO W/SCOPE: CPT | Performed by: STUDENT IN AN ORGANIZED HEALTH CARE EDUCATION/TRAINING PROGRAM

## 2025-07-21 PROCEDURE — 74177 CT ABD & PELVIS W/CONTRAST: CPT

## 2025-07-21 PROCEDURE — 83690 ASSAY OF LIPASE: CPT | Performed by: STUDENT IN AN ORGANIZED HEALTH CARE EDUCATION/TRAINING PROGRAM

## 2025-07-21 PROCEDURE — 82248 BILIRUBIN DIRECT: CPT | Performed by: STUDENT IN AN ORGANIZED HEALTH CARE EDUCATION/TRAINING PROGRAM

## 2025-07-21 PROCEDURE — 84075 ASSAY ALKALINE PHOSPHATASE: CPT | Performed by: STUDENT IN AN ORGANIZED HEALTH CARE EDUCATION/TRAINING PROGRAM

## 2025-07-21 PROCEDURE — 96360 HYDRATION IV INFUSION INIT: CPT | Mod: 59 | Performed by: STUDENT IN AN ORGANIZED HEALTH CARE EDUCATION/TRAINING PROGRAM

## 2025-07-21 PROCEDURE — 93005 ELECTROCARDIOGRAM TRACING: CPT | Performed by: STUDENT IN AN ORGANIZED HEALTH CARE EDUCATION/TRAINING PROGRAM

## 2025-07-21 PROCEDURE — 99285 EMERGENCY DEPT VISIT HI MDM: CPT | Mod: 25

## 2025-07-21 PROCEDURE — 84484 ASSAY OF TROPONIN QUANT: CPT | Performed by: STUDENT IN AN ORGANIZED HEALTH CARE EDUCATION/TRAINING PROGRAM

## 2025-07-21 PROCEDURE — 250N000011 HC RX IP 250 OP 636: Performed by: STUDENT IN AN ORGANIZED HEALTH CARE EDUCATION/TRAINING PROGRAM

## 2025-07-21 PROCEDURE — 258N000003 HC RX IP 258 OP 636: Performed by: STUDENT IN AN ORGANIZED HEALTH CARE EDUCATION/TRAINING PROGRAM

## 2025-07-21 PROCEDURE — 85004 AUTOMATED DIFF WBC COUNT: CPT | Performed by: STUDENT IN AN ORGANIZED HEALTH CARE EDUCATION/TRAINING PROGRAM

## 2025-07-21 PROCEDURE — 36415 COLL VENOUS BLD VENIPUNCTURE: CPT | Performed by: STUDENT IN AN ORGANIZED HEALTH CARE EDUCATION/TRAINING PROGRAM

## 2025-07-21 PROCEDURE — 250N000013 HC RX MED GY IP 250 OP 250 PS 637: Performed by: STUDENT IN AN ORGANIZED HEALTH CARE EDUCATION/TRAINING PROGRAM

## 2025-07-21 RX ORDER — ACETAMINOPHEN 325 MG/1
650 TABLET ORAL ONCE
Status: COMPLETED | OUTPATIENT
Start: 2025-07-21 | End: 2025-07-21

## 2025-07-21 RX ORDER — IOPAMIDOL 755 MG/ML
90 INJECTION, SOLUTION INTRAVASCULAR ONCE
Status: CANCELLED | OUTPATIENT
Start: 2025-07-21 | End: 2025-07-21

## 2025-07-21 RX ORDER — IOPAMIDOL 755 MG/ML
90 INJECTION, SOLUTION INTRAVASCULAR ONCE
Status: COMPLETED | OUTPATIENT
Start: 2025-07-21 | End: 2025-07-21

## 2025-07-21 RX ADMIN — ACETAMINOPHEN 650 MG: 325 TABLET ORAL at 13:18

## 2025-07-21 RX ADMIN — IOPAMIDOL 90 ML: 755 INJECTION, SOLUTION INTRAVENOUS at 13:46

## 2025-07-21 RX ADMIN — SODIUM CHLORIDE 1000 ML: 0.9 INJECTION, SOLUTION INTRAVENOUS at 14:10

## 2025-07-21 ASSESSMENT — COLUMBIA-SUICIDE SEVERITY RATING SCALE - C-SSRS
2. HAVE YOU ACTUALLY HAD ANY THOUGHTS OF KILLING YOURSELF IN THE PAST MONTH?: NO
6. HAVE YOU EVER DONE ANYTHING, STARTED TO DO ANYTHING, OR PREPARED TO DO ANYTHING TO END YOUR LIFE?: NO
1. IN THE PAST MONTH, HAVE YOU WISHED YOU WERE DEAD OR WISHED YOU COULD GO TO SLEEP AND NOT WAKE UP?: NO

## 2025-07-21 ASSESSMENT — ACTIVITIES OF DAILY LIVING (ADL)
ADLS_ACUITY_SCORE: 41

## 2025-07-21 NOTE — DISCHARGE INSTRUCTIONS
You were evaluated in the emergency department today for abdominal pain.  Your lab work today is all reassuring and normal.  Your white blood cell count is low as usual.  No changes from your baseline.  Your cardiac workup here today is unremarkable and reassuring as well.  CT scan of your abdomen and pelvis did not find any evidence of diverticulitis, appendicitis, bowel obstruction, significant constipation, or any other acute/emergent reason for why he would be having this abdominal pain.  Your urine test today does not find urinary tract infection.    Continue to keep a close eye on your symptoms.  If your symptoms do not improve over the next few days I recommend contacting your primary care provider to follow-up in clinic for continued monitoring and management.  If you start to develop severe worsened abdominal pain, large amounts of diarrhea or bright red blood in your stool, or develop nausea and vomiting return to the emergency department

## 2025-07-21 NOTE — TELEPHONE ENCOUNTER
LINKX and team,      Nurse Triage SBAR    Is this a 2nd Level Triage? YES, LICENSED PRACTITIONER REVIEW IS REQUIRED    Situation: patient transferred via red line for mentioning moderate to severe abdominal pain    Background: patient noting upper abdominal pain for last 4-5 days, states it does occasionally radiate to the center of her abdomen or just below umbilicus    Assessment: patient states pain is constant, currently rates pain a 5-6/10  Patient also noting some nausea accompanying pain but denies any other symptoms  Denies chest pain, difficulty breathing, neuro changes  Nurse advised with age and location/duration of pain would be best to go to ED to rule out cardiac involvement  Patient states she does not want to go to ED and would prefer to be scheduled with PCP clinic  Nurse attempted re-education regarding reasoning for disposition  Patient declined ED, wishes to see PCP   Discussed care advice and red flag symptoms  Patient verbalized understanding and agrees with plan of care.   Will call back with new or worsening symptoms      Protocol Recommended Disposition:   Go to ED Now    Recommendation: please advise on if OV appropriate or best disposition     Routed to provider    Does the patient meet one of the following criteria for ADS visit consideration? 16+ years old, with an [facility] PCP     TIP  Providers, please consider if this condition is appropriate for management at one of our Acute and Diagnostic Services sites.     If patient is a good candidate, please use dotphrase <dot>triageresponse and select Refer to ADS to document.  Reason for Disposition   Pain lasting > 10 minutes and over 50 years old    Additional Information   Negative: SEVERE difficulty breathing (e.g., struggling for each breath, speaks in single words)   Negative: Shock suspected (e.g., cold/pale/clammy skin, too weak to stand, low BP, rapid pulse)   Negative: Difficult to awaken or acting confused (e.g., disoriented, slurred  speech)   Negative: Passed out (e.g., fainted, lost consciousness, blacked out and was not responding)   Negative: Visible sweat on face or sweat is dripping down   Negative: Sounds like a life-threatening emergency to the triager   Negative: Followed an abdomen (stomach) injury   Negative: Chest pain   Negative: Abdominal pain and pregnant < 20 weeks   Negative: Abdominal pain and pregnant 20 or more weeks   Negative: Abdomen bloating or swelling are main symptoms   Negative: SEVERE abdominal pain (e.g., excruciating)    Protocols used: Abdominal Pain - Upper-A-[place]

## 2025-07-21 NOTE — ED NOTES
Introduced self to patient.  Whiteboard updated.  Pain assessed.  Plan of care and length of time discussed with patient.  Will continue to monitor.  Angi Benjamin RN.......7/21/2025 1:03 PM

## 2025-07-21 NOTE — ED PROVIDER NOTES
Emergency Department Encounter   NAME: Clarisse Dubon ; AGE: 69 year old female ; YOB: 1955 ; MRN: [id number] ; PCP: Tiffany Ruiz   ED PROVIDER: Andressa Harris PA-C    Evaluation Date & Time:   No admission date for patient encounter.    CHIEF COMPLAINT:  Abdominal Pain        Impression and Plan   FINAL IMPRESSION:    ICD-10-CM    1. Chronic neutropenia  D70.9       2. Generalized abdominal pain  R10.84           ED Course and Medical Decision Making  Clarisse Dubon is a 69 year old female with a pertinent history of atrial fibrillation s/p ablation, rheumatoid arthritis, and vaginal hysterectomy who presents to the ED for evaluation of bilateral lower abdominal type pain for the past 5 days.  Patient states the pain is fairly persistent, occasionally radiates into her epigastric region.  She has had decreased appetite the past 5 days as well.  Has had fairly regular bowel movements that are normal in appearance.  No melena or blood in her stool.  Denies any nausea or vomiting.  Denies urinary symptoms.  No flank pain.  Patient states pain is worse when she is sitting or standing upright, improves with laying flat.    Vitals reviewed and unremarkable. On exam she is resting comfortably, no acute distress. Differential diagnosis/emergent conditions considered and evaluated for includes but not limited to UTI, constipation, diverticulitis, appendicitis, pancreatitis, malignancy, ovarian torsion.  Her abdomen is overall soft and nondistended.  She has tenderness palpation of the bilateral lower quadrants as well as epigastric region.  No CVA tenderness bilaterally.  Lung sounds clear.  Heart rate regular.  Pt endorses 4/10 pain for several days, no severe worsening. I have low suspicion for ovarian torsion as a cause for her abdominal pain.  Patient does not have a uterus and I do not think pelvic ultrasound would be beneficial in this case.  Will obtain CT abdomen pelvis to further evaluate bilateral lower  abdominal pain.    Troponin 7 and gender reference range for normal.  Her abdominal pain has been ongoing for 5 days so this was not repeated.  Her EKG today shows sinus bradycardia with rate of 55 bpm.  No evidence of ischemia or concerning arrhythmia.  No significant change from previous EKG. I have low suspicion for acute cardiac etiology for her symptoms.    CT abdomen pelvis is unremarkable.  Her CBC today looks fairly consistent with previous persistent neutropenia and leukocytopenia.  Patient is afebrile and does not endorsing any systemic symptoms.  Patient has seen oncology for evaluation, most recently 7/30/2024 where she was noted to have chronic neutropenia without any obvious hematologic disorder.  CMP is unremarkable, normal LFTs.  Lipase normal. UA shows trace blood, no evidence of urinary tract infection.  Ultimately, I do not see any acute/emergent cause for her generalized abdominal pain.  I updated patient and she is reassured by this.  If her symptoms are not improving in a few days I recommend she contact her primary care provider for further workup and monitoring.  She will return to the emergency department for any new or worsening symptoms.        Medical Decision Making  Discharge. No recommendations on prescription strength medication(s). See documentation for any additional details.    MIPS (CTPE, Dental pain, [place], Sinusitis, Asthma/COPD, Head Trauma): Not Applicable    SEPSIS: None      I considered escalation of care and admitting the patient but ultimately did not given reassuring exam and workup.      ED COURSE:  1203 I met and introduced myself to the patient. I gathered initial history and performed my physical exam. We discussed plan for initial workup.   3:18 PM I rechecked the patient and discussed results, discharge, follow up, and reasons to return to the ED.     At the conclusion of the encounter I discussed the results of all the tests and the disposition. The questions were  "answered. The patient or family acknowledged understanding and was agreeable with the care plan.        MEDICATIONS GIVEN IN THE EMERGENCY DEPARTMENT:  Medications   acetaminophen (TYLENOL) tablet 650 mg (650 mg Oral $Given 7/21/25 1318)   sodium chloride 0.9% BOLUS 1,000 mL (1,000 mLs Intravenous $New Bag 7/21/25 1410)   iopamidol (ISOVUE-370) solution 90 mL (90 mLs Intravenous $Given 7/21/25 1346)         NEW PRESCRIPTIONS STARTED AT TODAY'S ED VISIT:  New Prescriptions    No medications on file         HPI     Clarisse Dubon is a 69 year old female with a pertinent history of atrial fibrillation s/p ablation, rheumatoid arthritis, and vaginal hysterectomy who presents to the ED for evaluation of bilateral lower abdominal type pain for the past 5 days.  Patient states the pain is fairly persistent, occasionally radiates into her epigastric region.  She has had decreased appetite the past 5 days as well.  Has had fairly regular bowel movements that are normal in appearance.  No melena or blood in her stool.  Denies any nausea or vomiting.  Denies urinary symptoms.  No flank pain.  Patient states pain is worse when she is sitting or standing upright, improves with laying flat.        Physical Exam     First Vitals:  /70   Pulse 50   Temp 97.5  F (36.4  C) (Temporal)   Resp 16   Ht 1.651 m (5' 5\")   Wt 52.2 kg (115 lb)   SpO2 99%   BMI 19.14 kg/m          PHYSICAL EXAM    General Appearance:  Alert, cooperative, no distress, appears stated age  HENT: Normocephalic without obvious deformity, atraumatic. Mucous membranes moist   Eyes: Conjunctiva clear, Lids normal. No discharge.   Respiratory: No distress. Lungs clear to ausculation bilaterally. No wheezes, rhonchi or stridor  Cardiovascular: Regular rate and rhythm, no murmur. Normal cap refill. No peripheral edema  GI: Abdomen soft, nondistended.  She has mild reproducible tenderness throughout the entire abdomen, slightly worse in the bilateral lower " quadrants.   : No CVA tenderness  Musculoskeletal: Moving all extremities. No gross deformities  Integument: Warm, dry, no rashes or lesions  Neurologic: Alert and orientated x3. No focal deficits.  Psych: Normal mood and affect        Results     LAB:  All pertinent labs reviewed and interpreted  Labs Ordered and Resulted from Time of ED Arrival to Time of ED Departure   CBC WITH PLATELETS AND DIFFERENTIAL - Abnormal       Result Value    WBC Count 1.5 (*)     RBC Count 4.24      Hemoglobin 12.0      Hematocrit 36.3      MCV 86      MCH 28.3      MCHC 33.1      RDW 13.1      Platelet Count 116 (*)     % Neutrophils 11      % Lymphocytes 68      % Monocytes 18      % Eosinophils 1      % Basophils 1      % Immature Granulocytes 1      NRBCs per 100 WBC 0      Absolute Neutrophils 0.2 (*)     Absolute Lymphocytes 1.0      Absolute Monocytes 0.3      Absolute Eosinophils 0.0      Absolute Basophils 0.0      Absolute Immature Granulocytes 0.0      Absolute NRBCs 0.0     TROPONIN T, HIGH SENSITIVITY - Normal    Troponin T, High Sensitivity 7     LIPASE - Normal    Lipase 23     COMPREHENSIVE METABOLIC PANEL - Normal    Sodium 141      Potassium 3.6      Carbon Dioxide (CO2) 25      Anion Gap 11      Urea Nitrogen 12.8      Creatinine 0.68      GFR Estimate >90      Calcium 9.2      Chloride 105      Glucose 96      Alkaline Phosphatase 85      AST 20      ALT 12      Protein Total 6.7      Albumin 4.2      Bilirubin Total 0.4     BILIRUBIN DIRECT - Normal    Bilirubin Direct 0.17     RBC AND PLATELET MORPHOLOGY    RBC Morphology Confirmed RBC Indices      Platelet Assessment        Value: Automated Count Confirmed. Platelet morphology is normal.   ROUTINE UA WITH MICROSCOPIC REFLEX TO CULTURE       RADIOLOGY:  CT Abdomen Pelvis w Contrast   Final Result   IMPRESSION:    No acute abnormality in the abdomen or pelvis.            ECG:  Performed at: 1209    Impression: Sinus bradycardia    Rate: 55  Rhythm: Sinus  bradycardia  Axis: Normal  DE Interval: 140  QRS Interval: 98  QTc Interval: 455  ST Changes: None  Comparison: No significant change from previous EKG    EKG results reviewed and interpreted by Dr. Pereyra, ED MD.       PROCEDURES:  N/A          Andressa Harris PA-C   Emergency Medicine   [facility] EMERGENCY ROOM       Andressa Harris PA-C  07/21/25 152

## 2025-07-21 NOTE — ED TRIAGE NOTES
Patient states she has had upper epigastric and generalized lower abdominal pain x5 days, not eating. History of cardiac ablation.     Triage Assessment (Adult)       Row Name 07/21/25 1207          Triage Assessment    Airway WDL WDL        Respiratory WDL    Respiratory WDL WDL        Skin Circulation/Temperature WDL    Skin Circulation/Temperature WDL WDL        Cardiac WDL    Cardiac WDL all        Chest Pain Assessment    Chest Pain Location epigastric     Chest Pain Radiation abdomen     Character pressure;sharp     Precipitating Factors at rest     Alleviating Factors rest     Chest Pain Intervention 12-lead ECG obtained        Peripheral/Neurovascular WDL    Peripheral Neurovascular WDL WDL        Cognitive/Neuro/Behavioral WDL    Cognitive/Neuro/Behavioral WDL WDL

## 2025-07-21 NOTE — TELEPHONE ENCOUNTER
Provider Response to 2nd Level Triage Request    I have reviewed the RN documentation. My recommendation is:  Refer to ED

## 2025-07-21 NOTE — TELEPHONE ENCOUNTER
Spoke with the patient to relay the provider's message. She verbalized understanding and states that her and her  agreed prior to the call and will have her seen.    Paige Dimas RN  [facility]

## 2025-08-09 ENCOUNTER — HEALTH MAINTENANCE LETTER (OUTPATIENT)
Age: 70
End: 2025-08-09